# Patient Record
Sex: FEMALE | Race: WHITE | NOT HISPANIC OR LATINO | URBAN - METROPOLITAN AREA
[De-identification: names, ages, dates, MRNs, and addresses within clinical notes are randomized per-mention and may not be internally consistent; named-entity substitution may affect disease eponyms.]

---

## 2017-01-15 ENCOUNTER — EMERGENCY (EMERGENCY)
Facility: HOSPITAL | Age: 82
LOS: 1 days | Discharge: HOME | End: 2017-01-15
Attending: EMERGENCY MEDICINE | Admitting: EMERGENCY MEDICINE
Payer: MEDICARE

## 2017-01-15 VITALS
SYSTOLIC BLOOD PRESSURE: 188 MMHG | WEIGHT: 143.08 LBS | TEMPERATURE: 98 F | OXYGEN SATURATION: 95 % | RESPIRATION RATE: 18 BRPM | HEIGHT: 67 IN | DIASTOLIC BLOOD PRESSURE: 91 MMHG | HEART RATE: 74 BPM

## 2017-01-15 DIAGNOSIS — J45.901 UNSPECIFIED ASTHMA WITH (ACUTE) EXACERBATION: ICD-10-CM

## 2017-01-15 DIAGNOSIS — R05 COUGH: ICD-10-CM

## 2017-01-15 DIAGNOSIS — Z79.899 OTHER LONG TERM (CURRENT) DRUG THERAPY: ICD-10-CM

## 2017-01-15 DIAGNOSIS — Z87.891 PERSONAL HISTORY OF NICOTINE DEPENDENCE: ICD-10-CM

## 2017-01-15 LAB
ALBUMIN SERPL ELPH-MCNC: 3.7 G/DL — SIGNIFICANT CHANGE UP (ref 3.4–5)
ALP SERPL-CCNC: 73 U/L — SIGNIFICANT CHANGE UP (ref 40–120)
ALT FLD-CCNC: 15 U/L — SIGNIFICANT CHANGE UP (ref 12–42)
ANION GAP SERPL CALC-SCNC: 9 MMOL/L — SIGNIFICANT CHANGE UP (ref 9–16)
AST SERPL-CCNC: 20 U/L — SIGNIFICANT CHANGE UP (ref 15–37)
BASOPHILS NFR BLD AUTO: 0.2 % — SIGNIFICANT CHANGE UP (ref 0–2)
BILIRUB SERPL-MCNC: 1.1 MG/DL — SIGNIFICANT CHANGE UP (ref 0.2–1.2)
BUN SERPL-MCNC: 17 MG/DL — SIGNIFICANT CHANGE UP (ref 7–23)
CALCIUM SERPL-MCNC: 10.1 MG/DL — SIGNIFICANT CHANGE UP (ref 8.5–10.5)
CHLORIDE SERPL-SCNC: 99 MMOL/L — SIGNIFICANT CHANGE UP (ref 96–108)
CK MB CFR SERPL CALC: 1.3 NG/ML — SIGNIFICANT CHANGE UP (ref 0.5–3.6)
CK SERPL-CCNC: 63 U/L — SIGNIFICANT CHANGE UP (ref 26–192)
CO2 SERPL-SCNC: 29 MMOL/L — SIGNIFICANT CHANGE UP (ref 22–31)
CREAT SERPL-MCNC: 1.19 MG/DL — SIGNIFICANT CHANGE UP (ref 0.5–1.3)
EOSINOPHIL NFR BLD AUTO: 0.7 % — SIGNIFICANT CHANGE UP (ref 0–6)
EXTRA BLUE TOP TUBE: SIGNIFICANT CHANGE UP
EXTRA SST TUBE: SIGNIFICANT CHANGE UP
GAS PNL BLDV: SIGNIFICANT CHANGE UP
GLUCOSE SERPL-MCNC: 85 MG/DL — SIGNIFICANT CHANGE UP (ref 70–99)
HCT VFR BLD CALC: 44 % — SIGNIFICANT CHANGE UP (ref 34.5–45)
HGB BLD-MCNC: 14.2 G/DL — SIGNIFICANT CHANGE UP (ref 11.5–15.5)
LACTATE SERPL-SCNC: 1.4 MMOL/L — SIGNIFICANT CHANGE UP (ref 0.5–2)
LYMPHOCYTES # BLD AUTO: 6.9 % — LOW (ref 13–44)
MCHC RBC-ENTMCNC: 29.5 PG — SIGNIFICANT CHANGE UP (ref 27–34)
MCHC RBC-ENTMCNC: 32.3 G/DL — SIGNIFICANT CHANGE UP (ref 32–36)
MCV RBC AUTO: 91.5 FL — SIGNIFICANT CHANGE UP (ref 80–100)
MONOCYTES NFR BLD AUTO: 8.2 % — SIGNIFICANT CHANGE UP (ref 2–14)
NEUTROPHILS NFR BLD AUTO: 84 % — HIGH (ref 43–77)
PLATELET # BLD AUTO: 446 K/UL — HIGH (ref 150–400)
POTASSIUM SERPL-MCNC: 4.5 MMOL/L — SIGNIFICANT CHANGE UP (ref 3.5–5.3)
POTASSIUM SERPL-SCNC: 4.5 MMOL/L — SIGNIFICANT CHANGE UP (ref 3.5–5.3)
PROT SERPL-MCNC: 8.1 G/DL — SIGNIFICANT CHANGE UP (ref 6.4–8.2)
RBC # BLD: 4.81 M/UL — SIGNIFICANT CHANGE UP (ref 3.8–5.2)
RBC # FLD: 16.2 % — SIGNIFICANT CHANGE UP (ref 10.3–16.9)
SODIUM SERPL-SCNC: 137 MMOL/L — SIGNIFICANT CHANGE UP (ref 135–145)
TROPONIN I SERPL-MCNC: <0.015 NG/ML — SIGNIFICANT CHANGE UP (ref 0.01–0.04)
WBC # BLD: 10.3 K/UL — SIGNIFICANT CHANGE UP (ref 3.8–10.5)
WBC # FLD AUTO: 10.3 K/UL — SIGNIFICANT CHANGE UP (ref 3.8–10.5)

## 2017-01-15 PROCEDURE — 93010 ELECTROCARDIOGRAM REPORT: CPT

## 2017-01-15 PROCEDURE — 85025 COMPLETE CBC W/AUTO DIFF WBC: CPT

## 2017-01-15 PROCEDURE — 71046 X-RAY EXAM CHEST 2 VIEWS: CPT

## 2017-01-15 PROCEDURE — 87040 BLOOD CULTURE FOR BACTERIA: CPT

## 2017-01-15 PROCEDURE — 82330 ASSAY OF CALCIUM: CPT

## 2017-01-15 PROCEDURE — 82553 CREATINE MB FRACTION: CPT

## 2017-01-15 PROCEDURE — 99284 EMERGENCY DEPT VISIT MOD MDM: CPT | Mod: 25

## 2017-01-15 PROCEDURE — 80053 COMPREHEN METABOLIC PANEL: CPT

## 2017-01-15 PROCEDURE — 84132 ASSAY OF SERUM POTASSIUM: CPT

## 2017-01-15 PROCEDURE — 83605 ASSAY OF LACTIC ACID: CPT

## 2017-01-15 PROCEDURE — 84295 ASSAY OF SERUM SODIUM: CPT

## 2017-01-15 PROCEDURE — 82550 ASSAY OF CK (CPK): CPT

## 2017-01-15 PROCEDURE — 71020: CPT | Mod: 26

## 2017-01-15 PROCEDURE — 84484 ASSAY OF TROPONIN QUANT: CPT

## 2017-01-15 PROCEDURE — 93005 ELECTROCARDIOGRAM TRACING: CPT

## 2017-01-15 PROCEDURE — 82803 BLOOD GASES ANY COMBINATION: CPT

## 2017-01-15 PROCEDURE — 94640 AIRWAY INHALATION TREATMENT: CPT

## 2017-01-15 RX ORDER — ALBUTEROL 90 UG/1
2 AEROSOL, METERED ORAL
Qty: 1 | Refills: 0 | OUTPATIENT
Start: 2017-01-15

## 2017-01-15 RX ORDER — IPRATROPIUM/ALBUTEROL SULFATE 18-103MCG
3 AEROSOL WITH ADAPTER (GRAM) INHALATION ONCE
Qty: 0 | Refills: 0 | Status: COMPLETED | OUTPATIENT
Start: 2017-01-15 | End: 2017-01-15

## 2017-01-15 RX ORDER — SODIUM CHLORIDE 9 MG/ML
1000 INJECTION INTRAMUSCULAR; INTRAVENOUS; SUBCUTANEOUS ONCE
Qty: 0 | Refills: 0 | Status: COMPLETED | OUTPATIENT
Start: 2017-01-15 | End: 2017-01-15

## 2017-01-15 RX ORDER — ALBUTEROL 90 UG/1
3 AEROSOL, METERED ORAL
Qty: 30 | Refills: 0 | OUTPATIENT
Start: 2017-01-15

## 2017-01-15 RX ADMIN — SODIUM CHLORIDE 1000 MILLILITER(S): 9 INJECTION INTRAMUSCULAR; INTRAVENOUS; SUBCUTANEOUS at 16:42

## 2017-01-15 RX ADMIN — Medication 3 MILLILITER(S): at 16:43

## 2017-01-15 RX ADMIN — Medication 3 MILLILITER(S): at 16:25

## 2017-01-15 RX ADMIN — Medication 50 MILLIGRAM(S): at 16:42

## 2017-01-15 RX ADMIN — Medication 1 TABLET(S): at 18:25

## 2017-01-15 NOTE — ED PROVIDER NOTE - CONDUCTED A DETAILED DISCUSSION WITH PATIENT AND/OR GUARDIAN REGARDING, MDM
need for outpatient follow-up/radiology results/lab results/return to ED if symptoms worsen, persist or questions arise

## 2017-01-15 NOTE — ED PROVIDER NOTE - PROGRESS NOTE DETAILS
pt feels better after nebs. labs unremarkable, given persistent productive cough and ?small infiltrate on CXR will treat with Augmentin x 10 day and also rx nebs/steroids for concomitant asthma/copd exacerbation. Pt advised to f/u with pulm/pmd promptly.

## 2017-01-15 NOTE — ED ADULT NURSE REASSESSMENT NOTE - NS ED NURSE REASSESS COMMENT FT1
IV removed, pt given d/c teaching and instructions regarding sx to monitor which would require return to ER.  Pt requested speaking to Dr. Alas.  Pt left ER immediately after speaking to Dr. Alas, and did not wait for discharge documents or vital sign remeasurement. Breathing unlabored, skin warm and dry.

## 2017-01-15 NOTE — ED PROVIDER NOTE - OBJECTIVE STATEMENT
81F with h/o afib on Pradaxa, COPD/asthma, hypothyroidism, who p/w cold prob of yellow sputum and cough 10 days ago followed by worsening cough and sob. Pt took some doxycycline she had at home x 10days with no improvement. No CP, no LE edema, no recent travel.  at home was sick with a cold as well. PMD Dr. Mathias, pulm Dr. Tate.

## 2017-01-15 NOTE — ED ADULT NURSE NOTE - OBJECTIVE STATEMENT
pt to ER w/ report of sob and cough.  pt reports recent course of oral abx for dx of bronchitis.  pt denies hughes, denies cp/f/c/n/v.  O2 sat noted to be 93% on room air.  pt placed on 2L O2 via nc.  no excessive work of breathing noted.  breath sounds clear on ausculation.  skin warm and dry.  Will continue to monitor.

## 2017-01-15 NOTE — ED PROVIDER NOTE - MEDICAL DECISION MAKING DETAILS
81F with persistent cough with SOb x 10 days not improved with doxy at home. Pt in no acute resp distress but with wheezes and rhonchi on exam. afebrile, VSS. Will check labs and cxr. nebs steroids possibly abx

## 2017-01-20 LAB
CULTURE RESULTS: SIGNIFICANT CHANGE UP
CULTURE RESULTS: SIGNIFICANT CHANGE UP
SPECIMEN SOURCE: SIGNIFICANT CHANGE UP
SPECIMEN SOURCE: SIGNIFICANT CHANGE UP

## 2017-01-26 ENCOUNTER — INPATIENT (INPATIENT)
Facility: HOSPITAL | Age: 82
LOS: 4 days | Discharge: HOME | DRG: 192 | End: 2017-01-31
Attending: INTERNAL MEDICINE | Admitting: INTERNAL MEDICINE
Payer: MEDICARE

## 2017-01-26 VITALS
DIASTOLIC BLOOD PRESSURE: 85 MMHG | HEART RATE: 82 BPM | TEMPERATURE: 98 F | RESPIRATION RATE: 22 BRPM | OXYGEN SATURATION: 88 % | WEIGHT: 143.96 LBS | SYSTOLIC BLOOD PRESSURE: 161 MMHG

## 2017-01-26 LAB
ALBUMIN SERPL ELPH-MCNC: 3.1 G/DL — LOW (ref 3.4–5)
ALP SERPL-CCNC: 57 U/L — SIGNIFICANT CHANGE UP (ref 40–120)
ALT FLD-CCNC: 31 U/L — SIGNIFICANT CHANGE UP (ref 12–42)
ANION GAP SERPL CALC-SCNC: 7 MMOL/L — LOW (ref 9–16)
AST SERPL-CCNC: 43 U/L — HIGH (ref 15–37)
BASE EXCESS BLDV CALC-SCNC: 4.4 MMOL/L — SIGNIFICANT CHANGE UP
BASOPHILS NFR BLD AUTO: 0.1 % — SIGNIFICANT CHANGE UP (ref 0–2)
BILIRUB SERPL-MCNC: 0.6 MG/DL — SIGNIFICANT CHANGE UP (ref 0.2–1.2)
BUN SERPL-MCNC: 17 MG/DL — SIGNIFICANT CHANGE UP (ref 7–23)
CA-I SERPL-SCNC: 1.15 MMOL/L — SIGNIFICANT CHANGE UP (ref 1.1–1.3)
CALCIUM SERPL-MCNC: 8.3 MG/DL — LOW (ref 8.5–10.5)
CHLORIDE SERPL-SCNC: 99 MMOL/L — SIGNIFICANT CHANGE UP (ref 96–108)
CO2 SERPL-SCNC: 31 MMOL/L — SIGNIFICANT CHANGE UP (ref 22–31)
CREAT SERPL-MCNC: 0.96 MG/DL — SIGNIFICANT CHANGE UP (ref 0.5–1.3)
EXTRA BLUE TOP TUBE: SIGNIFICANT CHANGE UP
GAS PNL BLDV: 135 MMOL/L — LOW (ref 138–146)
GAS PNL BLDV: SIGNIFICANT CHANGE UP
GAS PNL BLDV: SIGNIFICANT CHANGE UP
GLUCOSE SERPL-MCNC: 114 MG/DL — HIGH (ref 70–99)
HCO3 BLDV-SCNC: 30 MMOL/L — HIGH (ref 20–27)
HCT VFR BLD CALC: 39.9 % — SIGNIFICANT CHANGE UP (ref 34.5–45)
HGB BLD-MCNC: 13.3 G/DL — SIGNIFICANT CHANGE UP (ref 11.5–15.5)
LACTATE SERPL-SCNC: 2.8 MMOL/L — HIGH (ref 0.5–2)
LYMPHOCYTES # BLD AUTO: 3.5 % — LOW (ref 13–44)
MCHC RBC-ENTMCNC: 30.5 PG — SIGNIFICANT CHANGE UP (ref 27–34)
MCHC RBC-ENTMCNC: 33.3 G/DL — SIGNIFICANT CHANGE UP (ref 32–36)
MCV RBC AUTO: 91.5 FL — SIGNIFICANT CHANGE UP (ref 80–100)
MONOCYTES NFR BLD AUTO: 4.1 % — SIGNIFICANT CHANGE UP (ref 2–14)
NEUTROPHILS NFR BLD AUTO: 92.3 % — HIGH (ref 43–77)
PCO2 BLDV: 51 MMHG — SIGNIFICANT CHANGE UP (ref 41–51)
PH BLDV: 7.4 — SIGNIFICANT CHANGE UP (ref 7.32–7.43)
PLATELET # BLD AUTO: 324 K/UL — SIGNIFICANT CHANGE UP (ref 150–400)
PO2 BLDV: 33 MMHG — SIGNIFICANT CHANGE UP
POTASSIUM BLDV-SCNC: 4.1 MMOL/L — SIGNIFICANT CHANGE UP (ref 3.5–4.9)
POTASSIUM SERPL-MCNC: 4.1 MMOL/L — SIGNIFICANT CHANGE UP (ref 3.5–5.3)
POTASSIUM SERPL-SCNC: 4.1 MMOL/L — SIGNIFICANT CHANGE UP (ref 3.5–5.3)
PROT SERPL-MCNC: 6.7 G/DL — SIGNIFICANT CHANGE UP (ref 6.4–8.2)
RBC # BLD: 4.36 M/UL — SIGNIFICANT CHANGE UP (ref 3.8–5.2)
RBC # FLD: 16 % — SIGNIFICANT CHANGE UP (ref 10.3–16.9)
SODIUM SERPL-SCNC: 137 MMOL/L — SIGNIFICANT CHANGE UP (ref 135–145)
WBC # BLD: 10 K/UL — SIGNIFICANT CHANGE UP (ref 3.8–10.5)
WBC # FLD AUTO: 10 K/UL — SIGNIFICANT CHANGE UP (ref 3.8–10.5)

## 2017-01-26 PROCEDURE — 71020: CPT | Mod: 26

## 2017-01-26 PROCEDURE — 93010 ELECTROCARDIOGRAM REPORT: CPT

## 2017-01-26 PROCEDURE — 99285 EMERGENCY DEPT VISIT HI MDM: CPT | Mod: 25

## 2017-01-26 PROCEDURE — 71275 CT ANGIOGRAPHY CHEST: CPT | Mod: 26

## 2017-01-26 RX ORDER — DABIGATRAN ETEXILATE MESYLATE 150 MG/1
150 CAPSULE ORAL ONCE
Qty: 0 | Refills: 0 | Status: COMPLETED | OUTPATIENT
Start: 2017-01-26 | End: 2017-01-26

## 2017-01-26 RX ORDER — SODIUM CHLORIDE 9 MG/ML
1000 INJECTION INTRAMUSCULAR; INTRAVENOUS; SUBCUTANEOUS ONCE
Qty: 0 | Refills: 0 | Status: COMPLETED | OUTPATIENT
Start: 2017-01-26 | End: 2017-01-26

## 2017-01-26 RX ADMIN — SODIUM CHLORIDE 1000 MILLILITER(S): 9 INJECTION INTRAMUSCULAR; INTRAVENOUS; SUBCUTANEOUS at 18:38

## 2017-01-26 RX ADMIN — Medication 40 MILLIGRAM(S): at 18:33

## 2017-01-26 RX ADMIN — DABIGATRAN ETEXILATE MESYLATE 150 MILLIGRAM(S): 150 CAPSULE ORAL at 21:39

## 2017-01-26 NOTE — ED ADULT NURSE NOTE - OBJECTIVE STATEMENT
received pt in room 20. A&Ox3, presents to ed from pmd office for adm. dx bronchitis approx 1 week ago, c.o productive cough with sob. pt sating 88% ra. placed on supplemental o2 via nc, sating well at this time. denies any distress. pt denies recent fevers or chills. no cp. no n/v/d. ekg in progress. iv placed, labs drawn. will monitor.

## 2017-01-26 NOTE — ED PROVIDER NOTE - PROGRESS NOTE DETAILS
discussed with Dr. Keller covering for [facility].  Start solumedrol 40mg q8hr, levaquin IV for pneumonia/copd, and do cta chest r/o pe/ infiltrate.  Admit

## 2017-01-26 NOTE — ED ADULT TRIAGE NOTE - CHIEF COMPLAINT QUOTE
patient complains of productive cough and exertional SOB. patient coming from Dr. Tate's office for low oxygen levels.

## 2017-01-26 NOTE — PROGRESS NOTE ADULT - SUBJECTIVE AND OBJECTIVE BOX
Interventional, Pulmonary, Critical, Chest Special Procedures.    Pt was seen and fully examined by myself.     Time spent with patient in minutes:    Patient is a 81y old  Female who presents with a chief complaint of unresolving COPD exacerbation despite adequate O/p mngmnt  HPI:    REVIEW OF SYSTEMS:  Constitutional: No fever, weight loss,profound  fatigue  Eyes: No eye pain, visual disturbances, or discharge  ENMT:  No difficulty hearing, tinnitus, vertigo; No sinus or throat pain. No epistaxis, dysphagia, dysphonia, hoarseness or odynophagia  Neck: No pain, stiffness or neck swelling.  No masses or deformities  Respiratory: No cough, wheezing, chills or hemoptysis  - COPD  - ILD   - PE   - ASTHMA     - PNEUMONIA  Cardiovascular: No chest pain, dysrhythmia, palpitations, dizziness or edema   - COPD     - CAD   - CHF   - HTN  Gastrointestinal: No abdominal or epigastric pain. No nausea, vomiting or hematemesis; No diarrhea or constipation. No melena or hematochezia. No dysphagia or Icterus.          Genitourinary: No dysuria, frequency, hematuria or incontinence   - CKD/EVIE      - ESRD  Neurological: No headaches, memory loss, loss of strength, numbness or tremors      -DEMENTIA     - STROKE    - SEIZURE  Skin: No itching, burning, rashes or lesions   Lymph Nodes: No enlarged glands  Endocrine: No heat or cold intolerance; No hair loss       - DM     - THYROID DISORDER  Musculoskeletal: No joint pain or swelling; No muscle, back or extremity pain  Psychiatric: No depression, anxiety, mood swings or difficulty sleeping  Heme/Lymph: No easy bruising or bleeding gums         - ANEMIA      - CANCER   -COAGULOPATHY  Allergy and Immunologic: No hives or eczema    PAST MEDICAL & SURGICAL HISTORY:  Atrial fibrillation  Asthma  Bronchitis  No significant past surgical history    FAMILY HISTORY:    SOCIAL HISTORY:      - Tobacco     - ETOH    Allergies    Mustard (Anaphylaxis)  No Known Drug Allergies    Intolerances      Vital Signs Last 24 Hrs  T(C): 36.8, Max: 36.8 (01-26 @ 20:58)  T(F): 98.2, Max: 98.2 (01-26 @ 20:58)  HR: 72 (71 - 82)  BP: 178/85 (155/83 - 178/85)  BP(mean): --  RR: 20 (20 - 22)  SpO2: 97% (88% - 97%)      PHYSICAL EXAM:  Comfortable, no distress  Eyes: PERRL, EOM intact; conjunctiva and sclera clear  Head: Normocephalic;  No Trauma  ENMT: No nasal discharge, hoarseness, cough or hemoptysis.  Airway clear  Neck: Supple; non tender; no masses or deformities.    No JVD  Respiratory: CTA,  - WHEEZING   + RHONCHI  - RALES  + CRACKLES.  Diminished breath sounds  BILATERAL  RIGHT  LEFT  Cardiovascular: Regular rate and rhythm. S1 and S2 Normal; No murmurs, gallops or rubs     - PPM/AICD  Gastrointestinal: Soft non-tender, non-distended; Normal bowel sounds; No hepatosplenomegaly.     -PEG    -  GT   - GARCIA  Genitourinary: No costovertebral angle tenderness. No dysuria  Extremities: AROM, No clubbing, cyanosis or edema    Vascular: Peripheral pulses palpable 2+ bilaterally  Neurological: Alert and responisve to stimuli   Skin: Warm and dry. No obvious rash  Lymph Nodes: No acute cervical or supraclavicular adenopathy  Psychiatric: Cooperative and appropriate mood    DEVICES:  - DENTURES   +IV R / L     - ETUBE   -TRACH   -CTUBE  R / L      LABS:                          13.3   10.0  )-----------( 324      ( 26 Jan 2017 17:58 )             39.9     26 Jan 2017 17:58    137    |  99     |  17     ----------------------------<  114    4.1     |  31     |  0.96     Ca    8.3        26 Jan 2017 17:58    TPro  6.7    /  Alb  3.1    /  TBili  0.6    /  DBili  x      /  AST  43     /  ALT  31     /  AlkPhos  57     26 Jan 2017 17:58      EXAM:  XR CHEST PA - LAT                           PROCEDURE DATE:  01/15/2017                 INTERPRETATION:  XR CHEST PA - LAT dated 1/15/2017 4:53 PM    CLINICAL INFORMATION: Female, 81 years old.  cough, r/o pna, h/o copd.    PRIOR STUDIES: None.    FINDINGS: Heart size is, mediastinal and hilar contours are within normal   limits. Prominent sized left pericardial fat pad is incidentally noted.   There is hyperaeration as well as prominence of bronchovascular markings   which may suggest bronchitis. A lateral view there is blunting of the   posterior costophrenic angles which may represent small bibasilar pleural   effusions. There may be some mild pulmonary venous congestion. Soft   tissues and osseous structures demonstrate age-appropriate degenerative   change.    IMPRESSION:  Cardiomegaly with mild pulmonary venous congestion.  Hyperaeration.  Small bibasilar pleural effusionsRADIOLOGY & ADDITIONAL STUDIES (The following images were personally reviewed):  CT prelimunar reviewed c radiology, no obvious PE

## 2017-01-26 NOTE — ED ADULT NURSE REASSESSMENT NOTE - NS ED NURSE REASSESS COMMENT FT1
Pt endorsed to me from previous shift, A&O x4, denies any pain at this time, no SOB, breath sounds clear on auscultation, awaiting CT scan results. Will continue to monitor.

## 2017-01-26 NOTE — ED PROVIDER NOTE - OBJECTIVE STATEMENT
history of afib copd, here with shortness of breath with exertion/ dry cough for the past few weeks.  Seen in the ED, given prednisone and augmentin which she has taken without relief.  Denies fever/chills, pain.  Comfortable at rest but symptoms worsen with exertion/ activity.

## 2017-01-26 NOTE — ED PROVIDER NOTE - MEDICAL DECISION MAKING DETAILS
progressive shortness of breath/hughes.  diagnosed and treated for CAP but not improving with outpatient treatment.  concern for chf, pe, emphsema/copd given lack of fever and leukocytosis.  bnp mildly elevated, cta done to r/o pe.  signed out to Dr. Carrero/STEVE Sim pending cta with plan to admit for further treatment given hypoxia at rest/with exertion on room air.  antibiotics for cap, steroids for emphysema/copd given

## 2017-01-26 NOTE — ED CLERICAL - NS ED CLERK NOTE PRE-ARRIVAL INFORMATION; ADDITIONAL PRE-ARRIVAL INFORMATION
81/F/ ADELINA SHAW COPD, RX D PNEUMONIA HYPOXIC  CAN'T WALK MORE THAN A FEW STEPS DR. SAMIRA MELTON SENT THE PT DR. JULIUS GRACIA (DR. CONTRERAS)

## 2017-01-27 ENCOUNTER — TRANSCRIPTION ENCOUNTER (OUTPATIENT)
Age: 82
End: 2017-01-27

## 2017-01-27 DIAGNOSIS — I48.91 UNSPECIFIED ATRIAL FIBRILLATION: ICD-10-CM

## 2017-01-27 DIAGNOSIS — E03.9 HYPOTHYROIDISM, UNSPECIFIED: ICD-10-CM

## 2017-01-27 DIAGNOSIS — J44.1 CHRONIC OBSTRUCTIVE PULMONARY DISEASE WITH (ACUTE) EXACERBATION: ICD-10-CM

## 2017-01-27 DIAGNOSIS — R63.8 OTHER SYMPTOMS AND SIGNS CONCERNING FOOD AND FLUID INTAKE: ICD-10-CM

## 2017-01-27 DIAGNOSIS — J45.909 UNSPECIFIED ASTHMA, UNCOMPLICATED: ICD-10-CM

## 2017-01-27 DIAGNOSIS — I27.2 OTHER SECONDARY PULMONARY HYPERTENSION: ICD-10-CM

## 2017-01-27 DIAGNOSIS — Z29.9 ENCOUNTER FOR PROPHYLACTIC MEASURES, UNSPECIFIED: ICD-10-CM

## 2017-01-27 LAB
ANION GAP SERPL CALC-SCNC: 8 MMOL/L — LOW (ref 9–16)
BASOPHILS NFR BLD AUTO: 0 % — SIGNIFICANT CHANGE UP (ref 0–2)
BUN SERPL-MCNC: 15 MG/DL — SIGNIFICANT CHANGE UP (ref 7–23)
CALCIUM SERPL-MCNC: 8.9 MG/DL — SIGNIFICANT CHANGE UP (ref 8.5–10.5)
CHLORIDE SERPL-SCNC: 103 MMOL/L — SIGNIFICANT CHANGE UP (ref 96–108)
CO2 SERPL-SCNC: 28 MMOL/L — SIGNIFICANT CHANGE UP (ref 22–31)
CREAT SERPL-MCNC: 0.78 MG/DL — SIGNIFICANT CHANGE UP (ref 0.5–1.3)
EOSINOPHIL NFR BLD AUTO: 0 % — SIGNIFICANT CHANGE UP (ref 0–6)
GLUCOSE SERPL-MCNC: 138 MG/DL — HIGH (ref 70–99)
HCT VFR BLD CALC: 38.4 % — SIGNIFICANT CHANGE UP (ref 34.5–45)
HGB BLD-MCNC: 12.6 G/DL — SIGNIFICANT CHANGE UP (ref 11.5–15.5)
LYMPHOCYTES # BLD AUTO: 6.3 % — LOW (ref 13–44)
MAGNESIUM SERPL-MCNC: 2.3 MG/DL — SIGNIFICANT CHANGE UP (ref 1.6–2.4)
MCHC RBC-ENTMCNC: 30 PG — SIGNIFICANT CHANGE UP (ref 27–34)
MCHC RBC-ENTMCNC: 32.8 G/DL — SIGNIFICANT CHANGE UP (ref 32–36)
MCV RBC AUTO: 91.4 FL — SIGNIFICANT CHANGE UP (ref 80–100)
MONOCYTES NFR BLD AUTO: 2.8 % — SIGNIFICANT CHANGE UP (ref 2–14)
NEUTROPHILS NFR BLD AUTO: 90.9 % — HIGH (ref 43–77)
PHOSPHATE SERPL-MCNC: 3.3 MG/DL — SIGNIFICANT CHANGE UP (ref 2.5–4.5)
PLATELET # BLD AUTO: 294 K/UL — SIGNIFICANT CHANGE UP (ref 150–400)
POTASSIUM SERPL-MCNC: 4.2 MMOL/L — SIGNIFICANT CHANGE UP (ref 3.5–5.3)
POTASSIUM SERPL-SCNC: 4.2 MMOL/L — SIGNIFICANT CHANGE UP (ref 3.5–5.3)
RAPID RVP RESULT: DETECTED
RBC # BLD: 4.2 M/UL — SIGNIFICANT CHANGE UP (ref 3.8–5.2)
RBC # FLD: 15.9 % — SIGNIFICANT CHANGE UP (ref 10.3–16.9)
RV+EV RNA SPEC QL NAA+PROBE: DETECTED
SAO2 % BLDV: 63 % — SIGNIFICANT CHANGE UP
SODIUM SERPL-SCNC: 139 MMOL/L — SIGNIFICANT CHANGE UP (ref 135–145)
TSH SERPL-MCNC: 0.42 UIU/ML — SIGNIFICANT CHANGE UP (ref 0.35–4.94)
WBC # BLD: 8 K/UL — SIGNIFICANT CHANGE UP (ref 3.8–10.5)
WBC # FLD AUTO: 8 K/UL — SIGNIFICANT CHANGE UP (ref 3.8–10.5)

## 2017-01-27 PROCEDURE — 93306 TTE W/DOPPLER COMPLETE: CPT | Mod: 26

## 2017-01-27 PROCEDURE — 93010 ELECTROCARDIOGRAM REPORT: CPT

## 2017-01-27 RX ORDER — ALBUTEROL 90 UG/1
0 AEROSOL, METERED ORAL
Qty: 0 | Refills: 0 | COMMUNITY

## 2017-01-27 RX ORDER — TIOTROPIUM BROMIDE 18 UG/1
0 CAPSULE ORAL; RESPIRATORY (INHALATION)
Qty: 0 | Refills: 0 | COMMUNITY

## 2017-01-27 RX ORDER — FLUTICASONE PROPIONATE AND SALMETEROL 50; 250 UG/1; UG/1
0 POWDER ORAL; RESPIRATORY (INHALATION)
Qty: 0 | Refills: 0 | COMMUNITY

## 2017-01-27 RX ORDER — MINERAL OIL
30 OIL (ML) MISCELLANEOUS DAILY
Qty: 0 | Refills: 0 | Status: DISCONTINUED | OUTPATIENT
Start: 2017-01-27 | End: 2017-01-28

## 2017-01-27 RX ORDER — DABIGATRAN ETEXILATE MESYLATE 150 MG/1
150 CAPSULE ORAL EVERY 12 HOURS
Qty: 0 | Refills: 0 | Status: DISCONTINUED | OUTPATIENT
Start: 2017-01-27 | End: 2017-01-31

## 2017-01-27 RX ORDER — MINERAL OIL
30 OIL (ML) MISCELLANEOUS ONCE
Qty: 0 | Refills: 0 | Status: COMPLETED | OUTPATIENT
Start: 2017-01-27 | End: 2017-01-27

## 2017-01-27 RX ORDER — DABIGATRAN ETEXILATE MESYLATE 150 MG/1
150 CAPSULE ORAL DAILY
Qty: 0 | Refills: 0 | Status: DISCONTINUED | OUTPATIENT
Start: 2017-01-27 | End: 2017-01-27

## 2017-01-27 RX ORDER — IPRATROPIUM/ALBUTEROL SULFATE 18-103MCG
3 AEROSOL WITH ADAPTER (GRAM) INHALATION EVERY 6 HOURS
Qty: 0 | Refills: 0 | Status: DISCONTINUED | OUTPATIENT
Start: 2017-01-27 | End: 2017-01-27

## 2017-01-27 RX ORDER — DIGOXIN 250 MCG
0.25 TABLET ORAL DAILY
Qty: 0 | Refills: 0 | Status: DISCONTINUED | OUTPATIENT
Start: 2017-01-27 | End: 2017-01-31

## 2017-01-27 RX ORDER — LEVOTHYROXINE SODIUM 125 MCG
0 TABLET ORAL
Qty: 0 | Refills: 0 | COMMUNITY

## 2017-01-27 RX ORDER — PANTOPRAZOLE SODIUM 20 MG/1
1 TABLET, DELAYED RELEASE ORAL
Qty: 14 | Refills: 0
Start: 2017-01-27 | End: 2017-02-10

## 2017-01-27 RX ORDER — MONTELUKAST 4 MG/1
10 TABLET, CHEWABLE ORAL DAILY
Qty: 0 | Refills: 0 | Status: DISCONTINUED | OUTPATIENT
Start: 2017-01-27 | End: 2017-01-31

## 2017-01-27 RX ORDER — MONTELUKAST 4 MG/1
0 TABLET, CHEWABLE ORAL
Qty: 0 | Refills: 0 | COMMUNITY

## 2017-01-27 RX ORDER — PANTOPRAZOLE SODIUM 20 MG/1
40 TABLET, DELAYED RELEASE ORAL
Qty: 0 | Refills: 0 | Status: DISCONTINUED | OUTPATIENT
Start: 2017-01-27 | End: 2017-01-31

## 2017-01-27 RX ORDER — AMIODARONE HYDROCHLORIDE 400 MG/1
200 TABLET ORAL DAILY
Qty: 0 | Refills: 0 | Status: DISCONTINUED | OUTPATIENT
Start: 2017-01-27 | End: 2017-01-31

## 2017-01-27 RX ORDER — FLUTICASONE PROPIONATE AND SALMETEROL 50; 250 UG/1; UG/1
1 POWDER ORAL; RESPIRATORY (INHALATION)
Qty: 0 | Refills: 0 | Status: DISCONTINUED | OUTPATIENT
Start: 2017-01-27 | End: 2017-01-27

## 2017-01-27 RX ORDER — IPRATROPIUM/ALBUTEROL SULFATE 18-103MCG
3 AEROSOL WITH ADAPTER (GRAM) INHALATION EVERY 4 HOURS
Qty: 0 | Refills: 0 | Status: DISCONTINUED | OUTPATIENT
Start: 2017-01-27 | End: 2017-01-31

## 2017-01-27 RX ORDER — DABIGATRAN ETEXILATE MESYLATE 150 MG/1
0 CAPSULE ORAL
Qty: 0 | Refills: 0 | COMMUNITY

## 2017-01-27 RX ORDER — AMIODARONE HYDROCHLORIDE 400 MG/1
0 TABLET ORAL
Qty: 0 | Refills: 0 | COMMUNITY

## 2017-01-27 RX ORDER — LEVOTHYROXINE SODIUM 125 MCG
75 TABLET ORAL DAILY
Qty: 0 | Refills: 0 | Status: DISCONTINUED | OUTPATIENT
Start: 2017-01-27 | End: 2017-01-31

## 2017-01-27 RX ORDER — DABIGATRAN ETEXILATE MESYLATE 150 MG/1
150 CAPSULE ORAL
Qty: 0 | Refills: 0 | COMMUNITY

## 2017-01-27 RX ORDER — INSULIN LISPRO 100/ML
VIAL (ML) SUBCUTANEOUS
Qty: 0 | Refills: 0 | Status: DISCONTINUED | OUTPATIENT
Start: 2017-01-27 | End: 2017-01-31

## 2017-01-27 RX ADMIN — PANTOPRAZOLE SODIUM 40 MILLIGRAM(S): 20 TABLET, DELAYED RELEASE ORAL at 07:14

## 2017-01-27 RX ADMIN — Medication 75 MICROGRAM(S): at 07:14

## 2017-01-27 RX ADMIN — Medication 2: at 22:59

## 2017-01-27 RX ADMIN — Medication 30 MILLILITER(S): at 01:18

## 2017-01-27 RX ADMIN — DABIGATRAN ETEXILATE MESYLATE 150 MILLIGRAM(S): 150 CAPSULE ORAL at 10:10

## 2017-01-27 RX ADMIN — Medication 40 MILLIGRAM(S): at 01:17

## 2017-01-27 RX ADMIN — Medication 3 MILLILITER(S): at 10:09

## 2017-01-27 RX ADMIN — MONTELUKAST 10 MILLIGRAM(S): 4 TABLET, CHEWABLE ORAL at 10:14

## 2017-01-27 RX ADMIN — Medication 3 MILLILITER(S): at 18:33

## 2017-01-27 RX ADMIN — AMIODARONE HYDROCHLORIDE 200 MILLIGRAM(S): 400 TABLET ORAL at 07:13

## 2017-01-27 RX ADMIN — Medication 40 MILLIGRAM(S): at 18:33

## 2017-01-27 RX ADMIN — Medication 3 MILLILITER(S): at 13:39

## 2017-01-27 RX ADMIN — Medication 40 MILLIGRAM(S): at 10:10

## 2017-01-27 RX ADMIN — Medication 3 MILLILITER(S): at 04:00

## 2017-01-27 RX ADMIN — Medication 0.25 MILLIGRAM(S): at 07:14

## 2017-01-27 RX ADMIN — DABIGATRAN ETEXILATE MESYLATE 150 MILLIGRAM(S): 150 CAPSULE ORAL at 20:50

## 2017-01-27 RX ADMIN — Medication 3 MILLILITER(S): at 22:59

## 2017-01-27 NOTE — CONSULT NOTE ADULT - SUBJECTIVE AND OBJECTIVE BOX
Patient is a 81y old  Female who presents with a chief complaint of SOB (27 Jan 2017 00:29)        Reason for consult:      HPI:  Patient is a 81 year old female with a PMHX of asthma, COPD, Afib (on pradaxa) and hypothyroidism who presents with a three week history of cough. She is admitted for an acute exacerbation of COPD. She had a CTA showing a PA diameter of 4cm. She reports no snoring noticed by her  and no witnessed apneas. She is not interested in a work up for ADAR[facility]. She does not get vaccines, including pneumovax and the flu shot.   No hx of clots  No hx of rheumatological conditions,         Allergies: NKDA      Meds:      PAST MEDICAL & SURGICAL HISTORY:  Atrial fibrillation  Asthma  Bronchitis  No significant past surgical history        Family history:  FAMILY HISTORY:      Social history:      REVIEW OF SYSTEMS:  Constitutional: No fever, weight loss or fatigue  Eyes: No eye pain, visual disturbances, or discharge  ENMT:  No difficulty hearing, tinnitus, vertigo; No sinus or throat pain  Neck: No pain, stiffness or neck swelling  Respiratory: No cough, wheezing, chills or hemoptysis  Cardiovascular: No chest pain, palpitations, dizziness or leg swelling  Gastrointestinal: No abdominal or epigastric pain. No nausea, vomiting or hematemesis; No diarrhea or constipation. No melena or hematochezia.  Genitourinary: No dysuria, frequency, hematuria or incontinence  Neurological: No headaches, memory loss, loss of strength, numbness or tremors  Skin: No itching, burning, rashes or lesions   Lymph Nodes: No enlarged glands  Endocrine: No heat or cold intolerance; No hair loss  Musculoskeletal: No joint pain or swelling; No muscle, back or extremity pain  Psychiatric: No depression, anxiety, mood swings or difficulty sleeping  Heme/Lymph: No easy bruising or bleeding gums  Allergy and Immunologic: No hives or eczema          Vital Signs Last 24 Hrs  T(C): 36.8, Max: 36.8 (01-26 @ 20:58)  T(F): 98.2, Max: 98.3 (01-27 @ 13:50)  HR: 78 (70 - 80)  BP: 149/77 (149/77 - 178/85)  BP(mean): --  RR: 14 (14 - 20)  SpO2: 93% (93% - 97%)        PHYSICAL EXAM:    General: Well developed; well nourished; in no acute distress  Eyes: PERRL, EOM intact; conjunctiva and sclera clear  Head: Normocephalic; atraumatic  ENMT: No nasal discharge; airway clear  Neck: No JVD  Respiratory: diminished breath sounds b/l    Cardiovascular: Regular rate and rhythm. S1 and S2 Normal; No murmurs, gallops or rubs  Gastrointestinal: Soft non-tender non-distended; Normal bowel sounds; No hepatosplenomegaly  Genitourinary: No costovertebral angle tenderness  Extremities: Normal range of motion, No clubbing, cyanosis or edema  Vascular: Peripheral pulses palpable 2+ bilaterally  Neurological: Alert and oriented x3  Skin: Warm and dry. No obvious rash  Lymph Nodes: No acute cervical or supraclavicular adenopathy  Musculoskeletal: Normal gait, tone, without deformities  Psychiatric: Cooperative and appropriate mood    LABS:      CBC Full  -  ( 27 Jan 2017 08:04 )  WBC Count : 8.0 K/uL  Hemoglobin : 12.6 g/dL  Hematocrit : 38.4 %  Platelet Count - Automated : 294 K/uL  Mean Cell Volume : 91.4 fL  Mean Cell Hemoglobin : 30.0 pg  Mean Cell Hemoglobin Concentration : 32.8 g/dL  Auto Neutrophil # : x  Auto Lymphocyte # : x  Auto Monocyte # : x  Auto Eosinophil # : x  Auto Basophil # : x  Auto Neutrophil % : 90.9 %  Auto Lymphocyte % : 6.3 %  Auto Monocyte % : 2.8 %  Auto Eosinophil % : 0.0 %  Auto Basophil % : 0.0 %    27 Jan 2017 08:04    139    |  103    |  15     ----------------------------<  138    4.2     |  28     |  0.78     Ca    8.9        27 Jan 2017 08:04  Phos  3.3       27 Jan 2017 08:04  Mg     2.3       27 Jan 2017 08:04    TPro  6.7    /  Alb  3.1    /  TBili  0.6    /  DBili  x      /  AST  43     /  ALT  31     /  AlkPhos  57     26 Jan 2017 17:58                      RADIOLOGY & ADDITIONAL STUDIES (The following images were personally reviewed):

## 2017-01-27 NOTE — PROGRESS NOTE ADULT - SUBJECTIVE AND OBJECTIVE BOX
Interventional, Pulmonary, Critical, Chest Special Procedures.    Pt was seen and fully examined by myself.     Time spent with patient in minutes:37    Patient is a 81y old  Female who presents with a chief complaint of SOB (27 Jan 2017 00:29) the patient feels and appears better, less cough , less SOB    HPI:  Patient is a 81 year old female with a PMHX of asthma, COPD, Afib (on pradaxa) and hypothyroidism who presents with a three week history of cough. Cough is productive of yellow/clear sputum and symptoms are associated with decreased appetite but denies CP, palpitations, fever,  headache or recent travel. She notes her  has been suffering from a cold lately. Patient self prescribed doxycycline, which has worekd in the past, however symptoms did not improve. She saw Dr. Tate recently, who gave her a steroid taper on 1/19 but she has not completed it yet. Patient notes desaturating when walking down the hallway during that visit. Of note, she does not get vaccines, including pneumovax and the flu shot.     In the ED, VS were TMax 98.2 HR 77 /85 RR 20 O2 97 on 2L NC. She was given levaquin and 40 of solumedrol. (27 Jan 2017 00:29)    REVIEW OF SYSTEMS: updated  Constitutional: No fever, weight loss, chills or fatigue  Eyes: No eye pain, visual disturbances, or discharge  ENMT:  No difficulty hearing, tinnitus, vertigo; No sinus or throat pain. No epistaxis, dysphagia, dysphonia, hoarseness or odynophagia  Neck: No pain, stiffness or neck swelling.  No masses or deformities  Respiratory: No cough, wheezing, chills or hemoptysis  - COPD  - ILD   - PE   - ASTHMA     - PNEUMONIA  Cardiovascular: No chest pain, dysrhythmia, palpitations, dizziness or edema   - COPD     - CAD   - CHF   - HTN  Gastrointestinal: No abdominal or epigastric pain. No nausea, vomiting or hematemesis; No diarrhea or constipation. No melena or hematochezia. No dysphagia or Icterus.          Genitourinary: No dysuria, frequency, hematuria or incontinence   - CKD/EVIE      - ESRD  Neurological: No headaches, memory loss, loss of strength, numbness or tremors      -DEMENTIA     - STROKE    - SEIZURE  Skin: No itching, burning, rashes or lesions   Lymph Nodes: No enlarged glands  Endocrine: No heat or cold intolerance; No hair loss       - DM     - THYROID DISORDER  Musculoskeletal: No joint pain or swelling; No muscle, back or extremity pain  Psychiatric: No depression, anxiety, mood swings or difficulty sleeping  Heme/Lymph: No easy bruising or bleeding gums         - ANEMIA      - CANCER   -COAGULOPATHY  Allergy and Immunologic: No hives or eczema    PAST MEDICAL & SURGICAL HISTORY:  Atrial fibrillation  Asthma  Bronchitis  No significant past surgical history    FAMILY HISTORY:    SOCIAL HISTORY:      - Tobacco     - ETOH    Allergies    Mustard (Anaphylaxis)  No Known Drug Allergies    Intolerances      Vital Signs Last 24 Hrs  T(C): 36.8, Max: 36.8 (01-26 @ 20:58)  T(F): 98.2, Max: 98.3 (01-27 @ 13:50)  HR: 78 (70 - 80)  BP: 149/77 (149/77 - 178/85)  BP(mean): --  RR: 14 (14 - 20)  SpO2: 93% (93% - 97%)      PHYSICAL EXAM:  Comfortable, no distress  Eyes: PERRL, EOM intact; conjunctiva and sclera clear  Head: Normocephalic;  No Trauma  ENMT: No nasal discharge, hoarseness, cough or hemoptysis.  Airway clear  Neck: Supple; non tender; no masses or deformities.    No JVD  Respiratory: CTA,  - WHEEZING   - RHONCHI  - RALES  - CRACKLES.  Diminished breath sounds  BILATERAL  RIGHT  LEFT  Cardiovascular: Regular rate and rhythm. S1 and S2 Normal; No murmurs, gallops or rubs     - PPM/AICD  Gastrointestinal: Soft non-tender, non-distended; Normal bowel sounds; No hepatosplenomegaly.     -PEG    -  GT   - GARCIA  Genitourinary: No costovertebral angle tenderness. No dysuria  Extremities: AROM, No clubbing, cyanosis or edema    Vascular: Peripheral pulses palpable 2+ bilaterally  Neurological: Alert and responisve to stimuli   Skin: Warm and dry. No obvious rash  Lymph Nodes: No acute cervical or supraclavicular adenopathy  Psychiatric: Cooperative and appropriate mood    DEVICES:  - DENTURES   +IV R / L     - ETUBE   -TRACH   -CTUBE  R / L      LABS:                          12.6   8.0   )-----------( 294      ( 27 Jan 2017 08:04 )             38.4     27 Jan 2017 08:04    139    |  103    |  15     ----------------------------<  138    4.2     |  28     |  0.78     Ca    8.9        27 Jan 2017 08:04  Phos  3.3       27 Jan 2017 08:04  Mg     2.3       27 Jan 2017 08:04    TPro  6.7    /  Alb  3.1    /  TBili  0.6    /  DBili  x      /  AST  43     /  ALT  31     /  AlkPhos  57     26 Jan 2017 17:58      RADIOLOGY & ADDITIONAL STUDIES (The following images were personally reviewed):

## 2017-01-27 NOTE — CONSULT NOTE ADULT - PROBLEM SELECTOR RECOMMENDATION 9
- She has a PA diameter of 4cm on the CTA.   - Her ECHO shows a PASP of 34mm Hg, which is not significantly elevated.   - She doesn't have risk factors in her hx to suggest class 1, 2, 4 or5.  - This mild elevation could be a result of her acute exacerbation of COPD.  - an ECHO could be repeated as an outpatient when she is back to her baseline and not in an exacerbation of COPD.   - Have offered her sleep study testing but she is not interested.     -d/w Dr Rawls.

## 2017-01-27 NOTE — PROGRESS NOTE ADULT - PROBLEM SELECTOR PLAN 3
Stable. HR 71, irregular. 1/27 ECG showed atrial fibrillation, with left axis deviation, inferior infarct age undetermined, ST and T wave abnormality. QTC is 354 (interaction with levaquin and amio)  -c/w Pradaxa 150 mg PO q12h  -c/w digoxin 0.25mg PO qd  -c/w amiodarone 200 daily   -daily AM EKGs

## 2017-01-27 NOTE — DISCHARGE NOTE ADULT - PLAN OF CARE
You were found to have COPD exacerbation You had a COPD exacerbation likely secondary to a rhinovirus infections. You were started on prednisone. You will continue on a prednisone taper as following:   Please follow-up with Dr. Tate upon discharge. You had an echocardiogram mildly elevated pulmonary pressures (34mmHg). It is likely secondary to your current COPD exacerbation. Please follow-up as outpatient with Dr. Tate, It is possible you may need repeat echocardiogram when you are feeling better. Please continue your home medications as prescribed. You have elevated blood pressures while in the hospital. You were started on a medication Lisinopril, the dose was increased to 10mg daily. Please see Dr. Mathias upon discharge for medication optimization. You had an echocardiogram mildly elevated pulmonary pressures (34mmHg) with increased pulmonary artery diameter (4). It is likely secondary to your current COPD exacerbation or a mixed etiology. Please follow-up as outpatient with Dr. Tate and Dr Mathias for further work-up of this cause. You had a COPD exacerbation likely secondary to a rhinovirus infection. You required supplemental oxygen during your stay as your oxygen levels decreased with exertion. You will require oxygen upon discharge as well. Please use your home oxygen. Please continue on Prednisone 40mg daily.   Please follow-up with Dr. Tate upon discharge as well as Dr. Mathias. Please refrain from smoking as it can make your lung function worse. You do not need any more antibiotics as you completed a course of Levaquin. You had elevated blood pressures while in the hospital. You were started on a medication Lisinopril, the dose was increased to 10mg daily. Please see Dr. Mathias upon discharge for medication optimization. You had an echocardiogram mildly elevated pulmonary pressures (34mmHg) with increased pulmonary artery diameter (4). It is likely secondary to your current COPD exacerbation or a mixed etiology. Please follow-up as outpatient with Dr. Tate and Dr Mathias for further work-up of this cause. It was suggested by Dr. Taylorof you obtain EST, stress ECHO or Right Heart Cath with NS loading as outpatient. You had elevated blood pressures while in the hospital. You were started on a medication Lisinopril, the dose was increased to 5mg daily. Please see Dr. Mathias upon discharge for medication optimization.

## 2017-01-27 NOTE — H&P ADULT. - HISTORY OF PRESENT ILLNESS
Patient is a 81 year old female with a PMHX of asthma, COPD, Afib (on pradaxa) and hypothyroidism who presents with a three week history of cough. Cough is productive of yellow/clear sputum and symptoms are associated with decreased appetite but denies CP, palpitations, fever,  headache or recent travel. She notes her  has been suffering from a cold lately. Patient self prescribed doxycycline, which has worekd in the past, however symptoms did not improve. She saw Dr. Tate recently, who gave her a steroid taper on 1/19 but she has not completed it yet. Patient notes desaturating when walking down the hallway during that visit. Of note, she does not get vaccines, including pneumovax and the flu shot.     In the ED, VS were TMax 98.2 HR 77 /85 RR 20 O2 97 on 2L NC. She was given levaquin and 40 of solumedrol.

## 2017-01-27 NOTE — DISCHARGE NOTE ADULT - MEDICATION SUMMARY - MEDICATIONS TO TAKE
I will START or STAY ON the medications listed below when I get home from the hospital:    predniSONE 20 mg oral tablet  -- 2 tab(s) by mouth once a day  -- It is very important that you take or use this exactly as directed.  Do not skip doses or discontinue unless directed by your doctor.  Obtain medical advice before taking any non-prescription drugs as some may affect the action of this medication.  Take with food or milk.    -- Indication: For COPD exacerbation    lisinopril 5 mg oral tablet  -- 1 tab(s) by mouth once a day  -- Do not take this drug if you are pregnant.  It is very important that you take or use this exactly as directed.  Do not skip doses or discontinue unless directed by your doctor.  Some non-prescription drugs may aggravate your condition.  Read all labels carefully.  If a warning appears, check with your doctor before taking.    -- Indication: For Hypertension    amiodarone  -- 200  by mouth once a day  -- Indication: For Atrial fibrillation    digoxin 250 mcg (0.25 mg) oral tablet  -- 1 tab(s) by mouth once a day  -- Indication: For Atrial fibrillation    Pradaxa  -- 150  by mouth 2 times a day  -- Indication: For Atrial fibrillation    Advair Diskus 500 mcg-50 mcg inhalation powder  -- 1 puff(s) inhaled 2 times a day  -- Indication: For COPD exacerbation    Singulair  -- 10  by mouth once a day  -- Indication: For COPD exacerbation    pantoprazole 40 mg oral delayed release tablet  -- 1 tab(s) by mouth once a day (before a meal)  -- Indication: For gerd    Synthroid  -- 75  by mouth once a day  -- Indication: For Hypothyroid

## 2017-01-27 NOTE — DISCHARGE NOTE ADULT - CARE PROVIDERS DIRECT ADDRESSES
,[email or web address],[email or web address]

## 2017-01-27 NOTE — DISCHARGE NOTE ADULT - CARE PROVIDER_API CALL
Ethan Tate (MD), Internal Medicine  [address] [facility]   [address] [place] [address]  Phone: [phone]  Fax: [phone] Ethan Tate), Internal Medicine  [address] [facility]   [address]  [place] [address]  Phone: [phone]  Fax: [phone]    Maico Mathias), Cardiovascular Medicine  [address] [place] [address]  Phone: [phone]  Fax: [phone]

## 2017-01-27 NOTE — PROGRESS NOTE ADULT - SUBJECTIVE AND OBJECTIVE BOX
O/N Events:MAINOR  Subjective: Patient continues to have productive cough with clear sputum as per patient. Denies CP, SOB, palpitations, PND, n/v/d, fever, chills, LE edema. All other ROS noncontributory.     HPI  Pt is a 82 yo F w PMHx of COPD, asthma, afib on Pradaxa and amiodarone, hypothyroidism and recent COPD exacerbation presenting with worsening cough and SOB after failing outpatient treatment with Augmentin x 10days. Pt was seen at Dr. Tate’s office for persistent symptoms and started on prednisone 40mg yesterday.  She was noted to be saturating at 88% (her baseline low to mid 90’s per patient) was prescribed steroid taper,  Pt reports taking one dose and then presented to ED today. Patient reports symptoms started around January 4th. Patient had increased productive cough and RAE, only able to walk 1-2 blocks before getting SOB, previously able to walk 4-5 blocks. Patient reports getting bronchitis 2-3 times a year, usually takes doxy, which helps, but this time her symptoms continued to get worse. In ED she was treated with Levaquin and 40mg of solumedrol, transferred to inpatient unit.     PMH:   PAST MEDICAL & SURGICAL HISTORY:  Atrial fibrillation  Asthma  Bronchitis  No significant past surgical history      VITALS  Vital Signs Last 24 Hrs  T(C): 36.8, Max: 36.8 (01-26 @ 20:58)  T(F): 98.2, Max: 98.3 (01-27 @ 13:50)  HR: 78 (70 - 82)  BP: 149/77 (149/77 - 178/85)  BP(mean): --  RR: 14 (14 - 22)  SpO2: 93% (88% - 97%)    I&O's Summary      CAPILLARY BLOOD GLUCOSE  116 (27 Jan 2017 12:19)  127 (27 Jan 2017 07:41)      PHYSICAL EXAM  General: A&Ox 3; NAD pt speaking full sentences, not using accessory muscles.   Head: NC/AT;   Eyes: PERRL; EOMI; anicteric sclera  Neck: Supple; no JVD  Respiratory: CTA B/L; no wheezes/crackles/rales auscultated w/ good air movement  Cardiovascular: Regular rhythm/rate; S1/S2; no gallops or murmurs auscultated  Gastrointestinal: Soft; NTND w/out rebound tenderness or guarding; bowel sounds normal  Extremities: WWP; no edema or cyanosis; radial/pedal pulses palpable  Neurological:  CNII-XII grossly intact; no obvious focal deficits    MEDICATIONS  (STANDING):  amiodarone    Tablet 200milliGRAM(s) Oral daily  digoxin     Tablet 0.25milliGRAM(s) Oral daily  montelukast 10milliGRAM(s) Oral daily  levothyroxine 75MICROGram(s) Oral daily  pantoprazole    Tablet 40milliGRAM(s) Oral before breakfast  levoFLOXacin IVPB 500milliGRAM(s) IV Intermittent every 24 hours  methylPREDNISolone sodium succinate Injectable 40milliGRAM(s) IV Push every 8 hours  insulin lispro (HumaLOG) corrective regimen sliding scale  SubCutaneous Before meals and at bedtime  dabigatran 150milliGRAM(s) Oral every 12 hours  ALBUTerol/ipratropium for Nebulization 3milliLiter(s) Nebulizer every 4 hours    MEDICATIONS  (PRN):      LABS                        12.6   8.0   )-----------( 294      ( 27 Jan 2017 08:04 )             38.4     27 Jan 2017 08:04    139    |  103    |  15     ----------------------------<  138    4.2     |  28     |  0.78     Ca    8.9        27 Jan 2017 08:04  Phos  3.3       27 Jan 2017 08:04  Mg     2.3       27 Jan 2017 08:04    TPro  6.7    /  Alb  3.1    /  TBili  0.6    /  DBili  x      /  AST  43     /  ALT  31     /  AlkPhos  57     26 Jan 2017 17:58    LIVER FUNCTIONS - ( 26 Jan 2017 17:58 )  Alb: 3.1 g/dL / Pro: 6.7 g/dL / ALK PHOS: 57 U/L / ALT: 31 U/L / AST: 43 U/L / GGT: x               CARDIAC MARKERS ( 26 Jan 2017 17:58 )  <0.015 ng/mL / x     / 41 U/L / x     / 1.1 ng/mL

## 2017-01-27 NOTE — H&P ADULT. - PROBLEM SELECTOR PLAN 1
Failed outpatient treatment with prednisone taper. CT notable for mild pulmonary venous congestion and hyperaeration but no PE. Seen by Dr. Keller (covering Dr. Tate), who recommends solumedrol 40 q8, levaquin, echo, PPI, aspiration precautions  -Trend Qtc while giving levaquin and amiodarone or consider switching to azithromycin  -ISS while on solumedrol Failed outpatient treatment with prednisone taper. CT notable for mild pulmonary venous congestion and hyperaeration but no PE. Seen by Dr. Keller (covering Dr. Tate), who recommends solumedrol 40 q8, levaquin, echo, PPI, aspiration precautions. Pt was in ED with similar complaints last week and was discharged on augmentin but is unsure if she took it  -Trend Qtc while giving levaquin and amiodarone or consider switching to azithromycin  -ISS while on solumedrol

## 2017-01-27 NOTE — DISCHARGE NOTE ADULT - CARE PLAN
Principal Discharge DX:	COPD exacerbation  Goal:	You were found to have COPD exacerbation  Instructions for follow-up, activity and diet:	You had a COPD exacerbation likely secondary to a rhinovirus infections. You were started on prednisone. You will continue on a prednisone taper as following:   Please follow-up with Dr. Tate upon discharge.  Secondary Diagnosis:	Pulmonary hypertension  Instructions for follow-up, activity and diet:	You had an echocardiogram mildly elevated pulmonary pressures (34mmHg). It is likely secondary to your current COPD exacerbation. Please follow-up as outpatient with Dr. Tate, It is possible you may need repeat echocardiogram when you are feeling better.  Secondary Diagnosis:	Atrial fibrillation  Instructions for follow-up, activity and diet:	Please continue your home medications as prescribed. Principal Discharge DX:	COPD exacerbation  Goal:	You were found to have COPD exacerbation  Instructions for follow-up, activity and diet:	You had a COPD exacerbation likely secondary to a rhinovirus infections. You were started on prednisone. You will continue on a prednisone taper as following:   Please follow-up with Dr. Tate upon discharge.  Secondary Diagnosis:	Pulmonary hypertension  Instructions for follow-up, activity and diet:	You had an echocardiogram mildly elevated pulmonary pressures (34mmHg) with increased pulmonary artery diameter (4). It is likely secondary to your current COPD exacerbation or a mixed etiology. Please follow-up as outpatient with Dr. Tate and Dr Mathias for further work-up of this cause.  Secondary Diagnosis:	Atrial fibrillation  Instructions for follow-up, activity and diet:	Please continue your home medications as prescribed.  Secondary Diagnosis:	Hypertension  Instructions for follow-up, activity and diet:	You have elevated blood pressures while in the hospital. You were started on a medication Lisinopril, the dose was increased to 10mg daily. Please see Dr. Mathias upon discharge for medication optimization. Principal Discharge DX:	COPD exacerbation  Goal:	You were found to have COPD exacerbation  Instructions for follow-up, activity and diet:	You had a COPD exacerbation likely secondary to a rhinovirus infection. You required supplemental oxygen during your stay as your oxygen levels decreased with exertion. You will require oxygen upon discharge as well. Please use your home oxygen. Please continue on Prednisone 40mg daily.   Please follow-up with Dr. Tate upon discharge as well as Dr. Mathias. Please refrain from smoking as it can make your lung function worse. You do not need any more antibiotics as you completed a course of Levaquin.  Secondary Diagnosis:	Pulmonary hypertension  Instructions for follow-up, activity and diet:	You had an echocardiogram mildly elevated pulmonary pressures (34mmHg) with increased pulmonary artery diameter (4). It is likely secondary to your current COPD exacerbation or a mixed etiology. Please follow-up as outpatient with Dr. Tate and Dr Mathias for further work-up of this cause. It was suggested by Dr. Taylorof you obtain EST, stress ECHO or Right Heart Cath with NS loading as outpatient.  Secondary Diagnosis:	Atrial fibrillation  Instructions for follow-up, activity and diet:	Please continue your home medications as prescribed.  Secondary Diagnosis:	Hypertension  Instructions for follow-up, activity and diet:	You had elevated blood pressures while in the hospital. You were started on a medication Lisinopril, the dose was increased to 10mg daily. Please see Dr. Mathias upon discharge for medication optimization. Principal Discharge DX:	COPD exacerbation  Goal:	You were found to have COPD exacerbation  Instructions for follow-up, activity and diet:	You had a COPD exacerbation likely secondary to a rhinovirus infection. You required supplemental oxygen during your stay as your oxygen levels decreased with exertion. You will require oxygen upon discharge as well. Please use your home oxygen. Please continue on Prednisone 40mg daily.   Please follow-up with Dr. Tate upon discharge as well as Dr. Mathias. Please refrain from smoking as it can make your lung function worse. You do not need any more antibiotics as you completed a course of Levaquin.  Secondary Diagnosis:	Pulmonary hypertension  Instructions for follow-up, activity and diet:	You had an echocardiogram mildly elevated pulmonary pressures (34mmHg) with increased pulmonary artery diameter (4). It is likely secondary to your current COPD exacerbation or a mixed etiology. Please follow-up as outpatient with Dr. Tate and Dr Mathias for further work-up of this cause. It was suggested by Dr. Taylorof you obtain EST, stress ECHO or Right Heart Cath with NS loading as outpatient.  Secondary Diagnosis:	Atrial fibrillation  Instructions for follow-up, activity and diet:	Please continue your home medications as prescribed.  Secondary Diagnosis:	Hypertension  Instructions for follow-up, activity and diet:	You had elevated blood pressures while in the hospital. You were started on a medication Lisinopril, the dose was increased to 5mg daily. Please see Dr. Mathias upon discharge for medication optimization.

## 2017-01-27 NOTE — PROGRESS NOTE ADULT - PROBLEM SELECTOR PLAN 2
Pt with known asthma, only on advair and singulair at home.  Sees Dr. Tate.   -c/w Solumedrol 40 mg IV 48h  -c/w Monetlukast 10mg, PO, qd.   -c/w Duoneb q6h

## 2017-01-27 NOTE — CONSULT NOTE ADULT - ATTENDING COMMENTS
I have discussed the patient with the house officers and concur with the management and plan of action

## 2017-01-27 NOTE — DISCHARGE NOTE ADULT - HOSPITAL COURSE
Pt is a 82 yo F w PMHx of COPD, asthma, afib on Pradaxa and amiodarone, hypothyroidism and recent COPD exacerbation presenting with worsening cough and SOB after failing outpatient treatment with Augmentin x 10days. Pt was seen at Dr. Tate’s office for persistent symptoms and started on prednisone 40mg yesterday and counseled to go to ED for desaturation on RA while ambulating. She was found to have COPD exacerbation upon admission with +Rhinovirus.  She was treated with Levaquin started on IV solumedrol 40mg IV q 8 hrs. She had CT that showed moderate emphysema changes. Echo was wnl EF >60% with borderline pulmonary htn (34 mmhg). She was medically optimized for discharge home. Pt is a 80 yo F w PMHx of COPD, asthma, afib on Pradaxa and amiodarone, hypothyroidism and recent COPD exacerbation presenting with worsening cough and SOB after failing outpatient treatment with Augmentin x 10days. Pt was seen at Dr. Tate’s office for persistent symptoms and started on prednisone 40mg yesterday and counseled to go to ED for desaturation on RA while ambulating. She was found to have COPD exacerbation upon admission with +Rhinovirus.  She was treated with Levaquin started on IV solumedrol 40mg IV q 8 hrs. She had CT that showed moderate emphysema changes. Echo was wnl EF >60% with borderline pulmonary htn (34 mmhg). Dr Rawls and Dr. Velazquez evaluated patient. It was agreed that RAE may not be entirely 2/2 COPD.  Pulmonary artery was found to be grossly dilated (4) while ECHO showed mildy elevated PAS. There was no evidence of combined pulmonary fibrosis and emphysema (CPFE) on the CT Scan- an entity that may be associated with pulmonary hTN. D-Dimer was <150 and negative. PE was low suspicion. It was suggested that the rest of the work up---EST, stress ECHO or RHC with NS be done a out patient. Dr. Mathias was made aware of patient and wished to carry out outpatient work-up Pt is a 82 yo F w PMHx of COPD, asthma, afib on Pradaxa and amiodarone, hypothyroidism and recent COPD exacerbation presenting with worsening cough and SOB after failing outpatient treatment with Augmentin x 10days. Pt was seen at Dr. Tate’s office for persistent symptoms and started on prednisone 40mg yesterday and counseled to go to ED for desaturation on RA while ambulating. She was found to have COPD exacerbation upon admission with +Rhinovirus.  She was treated with Levaquin and started on IV solumedrol 40mg IV q 8 hrs. She had CT that showed moderate emphysema changes. Echo was wnl EF >60% with borderline pulmonary htn (34 mmhg) and dilated pulmonary artery. Dr Rawls and Dr. Velazquez evaluated patient. It was agreed that RAE may not be entirely 2/2 COPD.  Pulmonary artery was found to be grossly dilated (4) while ECHO showed mildy elevated PAS. There was no evidence of combined pulmonary fibrosis and emphysema (CPFE) on the CT Scan- an entity that may be associated with pulmonary hTN. D-Dimer was <150 and negative. PE was low suspicion. It was suggested that the rest of the work up---EST, stress ECHO or RHC with NS be done a out patient. Dr. Mathias was made aware of patient and wished to carry out outpatient work-up. Patient was noted to be desaturating while ambulating and will be discharged on home oxygen. Her SOB improved and she was medically optimized to be discharged home with continued work-up as outpatient.

## 2017-01-27 NOTE — PROGRESS NOTE ADULT - PROBLEM SELECTOR PLAN 1
Pt with increased dyspnea and sputum production. COPD exacerbation 2/2 to Rhino virus (pos RapRVP).  Pt afebrile (97.4F), WBC : 8, Blood cx showed no growth to date. Lungs CTAB.  Pt on suppl O2, 2L NC with adequate saturation. CT chest remarkable for moderate to severe emphysematous changes  - Continue Levaquin 500ml IV q24h (1/27-).  -DuoNebs q6 hrs PRN   - Solumedrol 40 mg IV 8h  - consider bedside spirometry for COPD management optimization.  -f/u echo

## 2017-01-28 DIAGNOSIS — I10 ESSENTIAL (PRIMARY) HYPERTENSION: ICD-10-CM

## 2017-01-28 PROBLEM — J45.909 UNSPECIFIED ASTHMA, UNCOMPLICATED: Chronic | Status: ACTIVE | Noted: 2017-01-15

## 2017-01-28 PROBLEM — J40 BRONCHITIS, NOT SPECIFIED AS ACUTE OR CHRONIC: Chronic | Status: ACTIVE | Noted: 2017-01-15

## 2017-01-28 PROBLEM — I48.91 UNSPECIFIED ATRIAL FIBRILLATION: Chronic | Status: ACTIVE | Noted: 2017-01-15

## 2017-01-28 LAB
ANION GAP SERPL CALC-SCNC: 7 MMOL/L — LOW (ref 9–16)
BUN SERPL-MCNC: 21 MG/DL — SIGNIFICANT CHANGE UP (ref 7–23)
CALCIUM SERPL-MCNC: 8.5 MG/DL — SIGNIFICANT CHANGE UP (ref 8.5–10.5)
CHLORIDE SERPL-SCNC: 103 MMOL/L — SIGNIFICANT CHANGE UP (ref 96–108)
CO2 SERPL-SCNC: 31 MMOL/L — SIGNIFICANT CHANGE UP (ref 22–31)
CREAT SERPL-MCNC: 0.9 MG/DL — SIGNIFICANT CHANGE UP (ref 0.5–1.3)
GLUCOSE SERPL-MCNC: 132 MG/DL — HIGH (ref 70–99)
HCT VFR BLD CALC: 38.5 % — SIGNIFICANT CHANGE UP (ref 34.5–45)
HGB BLD-MCNC: 12.6 G/DL — SIGNIFICANT CHANGE UP (ref 11.5–15.5)
MAGNESIUM SERPL-MCNC: 2.5 MG/DL — HIGH (ref 1.6–2.4)
MCHC RBC-ENTMCNC: 30 PG — SIGNIFICANT CHANGE UP (ref 27–34)
MCHC RBC-ENTMCNC: 32.7 G/DL — SIGNIFICANT CHANGE UP (ref 32–36)
MCV RBC AUTO: 91.7 FL — SIGNIFICANT CHANGE UP (ref 80–100)
PHOSPHATE SERPL-MCNC: 3.3 MG/DL — SIGNIFICANT CHANGE UP (ref 2.5–4.5)
PLATELET # BLD AUTO: 321 K/UL — SIGNIFICANT CHANGE UP (ref 150–400)
POTASSIUM SERPL-MCNC: 4.5 MMOL/L — SIGNIFICANT CHANGE UP (ref 3.5–5.3)
POTASSIUM SERPL-SCNC: 4.5 MMOL/L — SIGNIFICANT CHANGE UP (ref 3.5–5.3)
RBC # BLD: 4.2 M/UL — SIGNIFICANT CHANGE UP (ref 3.8–5.2)
RBC # FLD: 16.1 % — SIGNIFICANT CHANGE UP (ref 10.3–16.9)
SODIUM SERPL-SCNC: 141 MMOL/L — SIGNIFICANT CHANGE UP (ref 135–145)
WBC # BLD: 16.4 K/UL — HIGH (ref 3.8–10.5)
WBC # FLD AUTO: 16.4 K/UL — HIGH (ref 3.8–10.5)

## 2017-01-28 RX ORDER — LISINOPRIL 2.5 MG/1
2.5 TABLET ORAL DAILY
Qty: 0 | Refills: 0 | Status: DISCONTINUED | OUTPATIENT
Start: 2017-01-28 | End: 2017-01-30

## 2017-01-28 RX ORDER — MINERAL OIL
30 OIL (ML) MISCELLANEOUS AT BEDTIME
Qty: 0 | Refills: 0 | Status: DISCONTINUED | OUTPATIENT
Start: 2017-01-28 | End: 2017-01-31

## 2017-01-28 RX ADMIN — Medication 75 MICROGRAM(S): at 06:09

## 2017-01-28 RX ADMIN — PANTOPRAZOLE SODIUM 40 MILLIGRAM(S): 20 TABLET, DELAYED RELEASE ORAL at 07:10

## 2017-01-28 RX ADMIN — Medication 3 MILLILITER(S): at 18:04

## 2017-01-28 RX ADMIN — MONTELUKAST 10 MILLIGRAM(S): 4 TABLET, CHEWABLE ORAL at 12:45

## 2017-01-28 RX ADMIN — LISINOPRIL 2.5 MILLIGRAM(S): 2.5 TABLET ORAL at 18:04

## 2017-01-28 RX ADMIN — DABIGATRAN ETEXILATE MESYLATE 150 MILLIGRAM(S): 150 CAPSULE ORAL at 09:46

## 2017-01-28 RX ADMIN — Medication 30 MILLILITER(S): at 00:27

## 2017-01-28 RX ADMIN — Medication 3 MILLILITER(S): at 14:02

## 2017-01-28 RX ADMIN — Medication 40 MILLIGRAM(S): at 09:46

## 2017-01-28 RX ADMIN — Medication 30 MILLILITER(S): at 21:10

## 2017-01-28 RX ADMIN — Medication 3 MILLILITER(S): at 09:46

## 2017-01-28 RX ADMIN — Medication 3 MILLILITER(S): at 22:33

## 2017-01-28 RX ADMIN — Medication 40 MILLIGRAM(S): at 01:50

## 2017-01-28 RX ADMIN — Medication 3 MILLILITER(S): at 06:09

## 2017-01-28 RX ADMIN — Medication 3 MILLILITER(S): at 01:50

## 2017-01-28 RX ADMIN — AMIODARONE HYDROCHLORIDE 200 MILLIGRAM(S): 400 TABLET ORAL at 06:10

## 2017-01-28 RX ADMIN — DABIGATRAN ETEXILATE MESYLATE 150 MILLIGRAM(S): 150 CAPSULE ORAL at 22:31

## 2017-01-28 RX ADMIN — Medication 40 MILLIGRAM(S): at 18:04

## 2017-01-28 RX ADMIN — Medication 0.25 MILLIGRAM(S): at 06:10

## 2017-01-28 NOTE — PROGRESS NOTE ADULT - SUBJECTIVE AND OBJECTIVE BOX
INTERVAL HPI/OVERNIGHT EVENTS: No events overnight. Patient requested mineral oil for constipation. Today, patient denies HA, CP, SOB, palp, abd pain, n/v/d/c.     VITAL SIGNS:  T(F): 97.9  HR: 76  BP: 155/81  RR: 15  SpO2: 97%  Wt(kg): --    PHYSICAL EXAM:  General: A&Ox 3; NAD pt speaking full sentences, not using accessory muscles.   Head: NC/AT;   Eyes: PERRL; EOMI; anicteric sclera  Neck: Supple; no JVD  Respiratory: CTA B/L; no wheezes/crackles/rales auscultated w/ good air movement  Cardiovascular: Regular rhythm/rate; S1/S2; no gallops or murmurs auscultated  Gastrointestinal: Soft; NTND w/out rebound tenderness or guarding; bowel sounds normal  Extremities: WWP; no edema or cyanosis; radial/pedal pulses palpable  Neurological:  CNII-XII grossly intact; no obvious focal deficits    MEDICATIONS  (STANDING):  amiodarone    Tablet 200milliGRAM(s) Oral daily  digoxin     Tablet 0.25milliGRAM(s) Oral daily  montelukast 10milliGRAM(s) Oral daily  levothyroxine 75MICROGram(s) Oral daily  pantoprazole    Tablet 40milliGRAM(s) Oral before breakfast  levoFLOXacin IVPB 500milliGRAM(s) IV Intermittent every 24 hours  methylPREDNISolone sodium succinate Injectable 40milliGRAM(s) IV Push every 8 hours  insulin lispro (HumaLOG) corrective regimen sliding scale  SubCutaneous Before meals and at bedtime  dabigatran 150milliGRAM(s) Oral every 12 hours  ALBUTerol/ipratropium for Nebulization 3milliLiter(s) Nebulizer every 4 hours  mineral oil 30milliLiter(s) Oral daily    MEDICATIONS  (PRN):      Allergies    Mustard (Anaphylaxis)  No Known Drug Allergies    Intolerances        LABS:                        12.6   16.4  )-----------( 321      ( 28 Jan 2017 06:43 )             38.5     28 Jan 2017 06:43    141    |  103    |  21     ----------------------------<  132    4.5     |  31     |  0.90     Ca    8.5        28 Jan 2017 06:43  Phos  3.3       28 Jan 2017 06:43  Mg     2.5       28 Jan 2017 06:43    TPro  6.7    /  Alb  3.1    /  TBili  0.6    /  DBili  x      /  AST  43     /  ALT  31     /  AlkPhos  57     26 Jan 2017 17:58          RADIOLOGY & ADDITIONAL TESTS:

## 2017-01-28 NOTE — PROGRESS NOTE ADULT - SUBJECTIVE AND OBJECTIVE BOX
Interventional, Pulmonary, Critical, Chest Special Procedures.    Pt was seen and fully examined by myself.     Time spent with patient in minutes:67 extensive discussion c pt and her family bedside  Patient is a 81y old  Female who presents with a chief complaint of SOB (27 Jan 2017 00:29) the patient feels and appears better, less cough , less SOB      HPI:  Patient is a 81 year old female with a PMHX of asthma, COPD, Afib (on pradaxa) and hypothyroidism who presents with a three week history of cough. Cough is productive of yellow/clear sputum and symptoms are associated with decreased appetite but denies CP, palpitations, fever,  headache or recent travel. She notes her  has been suffering from a cold lately. Patient self prescribed doxycycline, which has worekd in the past, however symptoms did not improve. She saw Dr. Tate recently, who gave her a steroid taper on 1/19 but she has not completed it yet. Patient notes desaturating when walking down the hallway during that visit. Of note, she does not get vaccines, including pneumovax and the flu shot.     In the ED, VS were TMax 98.2 HR 77 /85 RR 20 O2 97 on 2L NC. She was given levaquin and 40 of solumedrol. (27 Jan 2017 00:29)    REVIEW OF SYSTEMS:  Constitutional: No fever, weight loss, chills or fatigue  Eyes: No eye pain, visual disturbances, or discharge  ENMT:  No difficulty hearing, tinnitus, vertigo; No sinus or throat pain. No epistaxis, dysphagia, dysphonia, hoarseness or odynophagia  Neck: No pain, stiffness or neck swelling.  No masses or deformities  Respiratory: No cough, wheezing, chills or hemoptysis  - COPD  - ILD   - PE   - ASTHMA     - PNEUMONIA  Cardiovascular: No chest pain, dysrhythmia, palpitations, dizziness or edema   - COPD     - CAD   - CHF   - HTN  Gastrointestinal: No abdominal or epigastric pain. No nausea, vomiting or hematemesis; No diarrhea or constipation. No melena or hematochezia. No dysphagia or Icterus.          Genitourinary: No dysuria, frequency, hematuria or incontinence   - CKD/EVIE      - ESRD  Neurological: No headaches, memory loss, loss of strength, numbness or tremors      -DEMENTIA     - STROKE    - SEIZURE  Skin: No itching, burning, rashes or lesions   Lymph Nodes: No enlarged glands  Endocrine: No heat or cold intolerance; No hair loss       - DM     - THYROID DISORDER  Musculoskeletal: No joint pain or swelling; No muscle, back or extremity pain  Psychiatric: No depression, anxiety, mood swings or difficulty sleeping  Heme/Lymph: No easy bruising or bleeding gums         - ANEMIA      - CANCER   -COAGULOPATHY  Allergy and Immunologic: No hives or eczema    PAST MEDICAL & SURGICAL HISTORY:  Atrial fibrillation  Asthma  Bronchitis  No significant past surgical history    FAMILY HISTORY:    SOCIAL HISTORY:      - Tobacco     - ETOH    Allergies    Mustard (Anaphylaxis)  No Known Drug Allergies    Intolerances      Vital Signs Last 24 Hrs  T(C): 36.6, Max: 36.6 (01-27 @ 21:00)  T(F): 97.9, Max: 97.9 (01-28 @ 10:22)  HR: 76 (73 - 78)  BP: 155/81 (144/85 - 177/94)  BP(mean): --  RR: 15 (15 - 18)  SpO2: 97% (95% - 97%)      PHYSICAL EXAM:  more Comfortable, no distress  Eyes: PERRL, EOM intact; conjunctiva and sclera clear  Head: Normocephalic;  No Trauma  ENMT: No nasal discharge,+ hoarseness,+ cough no hemoptysis.  Neck: Supple; non tender; no masses or deformities.    No JVD  Respiratory:   - WHEEZING    RHONCHI  - RALES  + CRACKLES.  Diminished breath sounds  BILATERAL  RIGHT  LEFT bases  Cardiovascular: Regular rate and rhythm. S1 and S2 Normal; No murmurs, gallops or rubs     - PPM/AICD  Gastrointestinal: Soft non-tender, non-distended; Normal bowel sounds; No hepatosplenomegaly.     -PEG    -  GT   - GARCIA  Genitourinary: No costovertebral angle tenderness. No dysuria  Extremities: AROM, No clubbing, cyanosis or edema    Vascular: Peripheral pulses palpable 2+ bilaterally  Neurological: Alert and responisve to stimuli   Skin: Warm and dry. No obvious rash  Lymph Nodes: No acute cervical or supraclavicular adenopathy  Psychiatric: Cooperative and appropriate mood    DEVICES:  - DENTURES   +IV R / L     - ETUBE   -TRACH   -CTUBE  R / L      LABS:                          12.6   16.4  )-----------( 321      ( 28 Jan 2017 06:43 )             38.5     28 Jan 2017 06:43    141    |  103    |  21     ----------------------------<  132    4.5     |  31     |  0.90     Ca    8.5        28 Jan 2017 06:43  Phos  3.3       28 Jan 2017 06:43  Mg     2.5       28 Jan 2017 06:43    TPro  6.7    /  Alb  3.1    /  TBili  0.6    /  DBili  x      /  AST  43     /  ALT  31     /  AlkPhos  57     26 Jan 2017 17:58      EXAM:  ECHOCARDIOGRAM (CARDIOL)       PROCEDURE DATE:  01/27/2017                    INTERPRETATION:  Patient Height: 170.0 cm  Patient Weight: 65.0 kg  Systolic Pressure: 171 mmHg  Diastolic Pressure: 78 mmHg  BSA: 1.8 m^2  Interpretation Summary  There is mild concentric left ventricular hypertrophy.The left   ventricular wall   motion is normal. The left ventricular ejection fraction is estimated to   be   60-65%  The left atrium is mildly dilated. The right atrium is   dilated.The   right ventricle is normal in size and function.There is mild to moderate   aortic regurgitation.No hemodynamically significant valvular aortic   stenosis.There is trace mitral regurgitation.There is trace to mild   tricuspid   regurgitation.There is borderline pulmonary hypertension. The pulmonary   artery   systolic pressure is estimated to be 34 mmHg.  No pulmonic regurgitation   noted. There is no pulmonic stenosis.No aortic root dilatation.The   inferior   vena cava is normal in size (<2.1 cm) with normal inspiratory collapse   (>50%)   consistent with normal right atrial pressure.  There is no pericardial   effusion.  Procedure Details  A complete two-dimensional transthoracic echocardiogram was performed (2D,  M-mode, spectral and color flow doppler).  Study Quality: Good.  Left Ventricle  There is mild concentric left ventricular hypertrophy.  The left ventricular wall motion is normal.  The left ventricular ejection fraction is normal.  The left ventricular ejection fraction is estimated to be 60-65%  Left Atrium  The left atrium is mildly dilated.  The left atrial volume index is 35 cc/m2 (normal <34cc/m2)  Right Atrium  The right atrium is dilated.  Right Ventricle  The right ventricle is normal in size and function.  The tricuspid annular plane systolic excursion (TAPSE) is 18 mm (normal   17mm  or higher).  Aortic Valve  The aortic valve is trileaflet.  There is mild to moderate aortic regurgitation.  Pressure half time measures 745 msec.  No hemodynamically significant valvular aortic stenosis.  Mitral Valve  There is trace mitral regurgitation.  Tricuspid Valve  There is trace to mild tricuspid regurgitation.  RADIOLOGY & ADDITIONAL STUDIES (The following images were personally reviewed):

## 2017-01-28 NOTE — PROGRESS NOTE ADULT - PROBLEM SELECTOR PLAN 1
Pt with increased dyspnea and sputum production. COPD exacerbation 2/2 to Rhino virus (pos RapRVP).  Pt afebrile (97.4F), WBC : 8, Blood cx showed no growth to date. Lungs CTAB.  Pt on suppl O2, 2L NC with adequate saturation. CT chest remarkable for moderate to severe emphysematous changes  - Continue Levaquin 500ml IV q24h (1/27-).  - Duonebs q6 hrs PRN   - c/w Solumedrol 40 mg IV 8h  - consider bedside spirometry for COPD management optimization.  - Echo EF 60-65%  - Dr. Rawls consulted for pulm HTN eval

## 2017-01-28 NOTE — PROGRESS NOTE ADULT - PROBLEM SELECTOR PLAN 2
Pt with known asthma, only on advair and singulair at home.  Sees Dr. Tate.   -c/w Solumedrol 40 mg IV q8h  -c/w Monetlukast 10mg, PO, qd  -c/w Duoneb q6h

## 2017-01-29 LAB
ANION GAP SERPL CALC-SCNC: 9 MMOL/L — SIGNIFICANT CHANGE UP (ref 9–16)
BUN SERPL-MCNC: 21 MG/DL — SIGNIFICANT CHANGE UP (ref 7–23)
CALCIUM SERPL-MCNC: 8.8 MG/DL — SIGNIFICANT CHANGE UP (ref 8.5–10.5)
CHLORIDE SERPL-SCNC: 103 MMOL/L — SIGNIFICANT CHANGE UP (ref 96–108)
CO2 SERPL-SCNC: 28 MMOL/L — SIGNIFICANT CHANGE UP (ref 22–31)
CREAT SERPL-MCNC: 0.87 MG/DL — SIGNIFICANT CHANGE UP (ref 0.5–1.3)
GLUCOSE SERPL-MCNC: 119 MG/DL — HIGH (ref 70–99)
HCT VFR BLD CALC: 37.2 % — SIGNIFICANT CHANGE UP (ref 34.5–45)
HGB BLD-MCNC: 12.3 G/DL — SIGNIFICANT CHANGE UP (ref 11.5–15.5)
MAGNESIUM SERPL-MCNC: 2.4 MG/DL — SIGNIFICANT CHANGE UP (ref 1.6–2.4)
MCHC RBC-ENTMCNC: 30.3 PG — SIGNIFICANT CHANGE UP (ref 27–34)
MCHC RBC-ENTMCNC: 33.1 G/DL — SIGNIFICANT CHANGE UP (ref 32–36)
MCV RBC AUTO: 91.6 FL — SIGNIFICANT CHANGE UP (ref 80–100)
PLATELET # BLD AUTO: 275 K/UL — SIGNIFICANT CHANGE UP (ref 150–400)
POTASSIUM SERPL-MCNC: 4.2 MMOL/L — SIGNIFICANT CHANGE UP (ref 3.5–5.3)
POTASSIUM SERPL-SCNC: 4.2 MMOL/L — SIGNIFICANT CHANGE UP (ref 3.5–5.3)
RBC # BLD: 4.06 M/UL — SIGNIFICANT CHANGE UP (ref 3.8–5.2)
RBC # FLD: 16.2 % — SIGNIFICANT CHANGE UP (ref 10.3–16.9)
SODIUM SERPL-SCNC: 140 MMOL/L — SIGNIFICANT CHANGE UP (ref 135–145)
WBC # BLD: 16.4 K/UL — HIGH (ref 3.8–10.5)
WBC # FLD AUTO: 16.4 K/UL — HIGH (ref 3.8–10.5)

## 2017-01-29 RX ORDER — LISINOPRIL 2.5 MG/1
2.5 TABLET ORAL ONCE
Qty: 0 | Refills: 0 | Status: COMPLETED | OUTPATIENT
Start: 2017-01-29 | End: 2017-01-29

## 2017-01-29 RX ADMIN — Medication 40 MILLIGRAM(S): at 02:14

## 2017-01-29 RX ADMIN — LISINOPRIL 2.5 MILLIGRAM(S): 2.5 TABLET ORAL at 22:58

## 2017-01-29 RX ADMIN — Medication 30 MILLILITER(S): at 21:42

## 2017-01-29 RX ADMIN — Medication 3 MILLILITER(S): at 02:15

## 2017-01-29 RX ADMIN — LISINOPRIL 2.5 MILLIGRAM(S): 2.5 TABLET ORAL at 06:59

## 2017-01-29 RX ADMIN — DABIGATRAN ETEXILATE MESYLATE 150 MILLIGRAM(S): 150 CAPSULE ORAL at 10:13

## 2017-01-29 RX ADMIN — Medication 75 MICROGRAM(S): at 06:59

## 2017-01-29 RX ADMIN — Medication 3 MILLILITER(S): at 13:32

## 2017-01-29 RX ADMIN — PANTOPRAZOLE SODIUM 40 MILLIGRAM(S): 20 TABLET, DELAYED RELEASE ORAL at 07:00

## 2017-01-29 RX ADMIN — Medication 3 MILLILITER(S): at 06:00

## 2017-01-29 RX ADMIN — Medication 3 MILLILITER(S): at 10:13

## 2017-01-29 RX ADMIN — AMIODARONE HYDROCHLORIDE 200 MILLIGRAM(S): 400 TABLET ORAL at 06:58

## 2017-01-29 RX ADMIN — Medication 40 MILLIGRAM(S): at 10:14

## 2017-01-29 RX ADMIN — Medication 3 MILLILITER(S): at 17:08

## 2017-01-29 RX ADMIN — MONTELUKAST 10 MILLIGRAM(S): 4 TABLET, CHEWABLE ORAL at 12:08

## 2017-01-29 RX ADMIN — Medication 0.25 MILLIGRAM(S): at 06:59

## 2017-01-29 RX ADMIN — DABIGATRAN ETEXILATE MESYLATE 150 MILLIGRAM(S): 150 CAPSULE ORAL at 21:41

## 2017-01-29 RX ADMIN — Medication 40 MILLIGRAM(S): at 22:58

## 2017-01-29 RX ADMIN — Medication 3 MILLILITER(S): at 22:59

## 2017-01-29 NOTE — PROGRESS NOTE ADULT - SUBJECTIVE AND OBJECTIVE BOX
Interventional, Pulmonary, Critical, Chest Special Procedures.    Pt was seen and fully examined by myself.     Time spent with patient in minutes:37    Patient is a 81y old  Female who presents with a chief complaint of SOB (27 Jan 2017 19:51)The patient reports feeling better today, cough decreased, eupneic at rest    HPI:  Patient is a 81 year old female with a PMHX of asthma, COPD, Afib (on pradaxa) and hypothyroidism who presents with a three week history of cough. Cough is productive of yellow/clear sputum and symptoms are associated with decreased appetite but denies CP, palpitations, fever,  headache or recent travel. She notes her  has been suffering from a cold lately. Patient self prescribed doxycycline, which has worekd in the past, however symptoms did not improve. She saw Dr. Tate recently, who gave her a steroid taper on 1/19 but she has not completed it yet. Patient notes desaturating when walking down the hallway during that visit. Of note, she does not get vaccines, including pneumovax and the flu shot.     In the ED, VS were TMax 98.2 HR 77 /85 RR 20 O2 97 on 2L NC. She was given levaquin and 40 of solumedrol. (27 Jan 2017 00:29)    REVIEW OF SYSTEMS:updated  Constitutional: No fever, weight loss, chills or fatigue  Eyes: No eye pain, visual disturbances, or discharge  ENMT:  No difficulty hearing, tinnitus, vertigo; No sinus or throat pain. No epistaxis, dysphagia, dysphonia, hoarseness or odynophagia  Neck: No pain, stiffness or neck swelling.  No masses or deformities  Respiratory: No cough, wheezing, chills or hemoptysis  - COPD  - ILD   - PE   - ASTHMA     - PNEUMONIA  Cardiovascular: No chest pain, dysrhythmia, palpitations, dizziness or edema   - COPD     - CAD   - CHF   - HTN  Gastrointestinal: No abdominal or epigastric pain. No nausea, vomiting or hematemesis; No diarrhea or constipation. No melena or hematochezia. No dysphagia or Icterus.          Genitourinary: No dysuria, frequency, hematuria or incontinence   - CKD/EVIE      - ESRD  Neurological: No headaches, memory loss, loss of strength, numbness or tremors      -DEMENTIA     - STROKE    - SEIZURE  Skin: No itching, burning, rashes or lesions   Lymph Nodes: No enlarged glands  Endocrine: No heat or cold intolerance; No hair loss       - DM     - THYROID DISORDER  Musculoskeletal: No joint pain or swelling; No muscle, back or extremity pain  Psychiatric: No depression, anxiety, mood swings or difficulty sleeping  Heme/Lymph: No easy bruising or bleeding gums         - ANEMIA      - CANCER   -COAGULOPATHY  Allergy and Immunologic: No hives or eczema    PAST MEDICAL & SURGICAL HISTORY:  Atrial fibrillation  Asthma  Bronchitis  No significant past surgical history    FAMILY HISTORY:    SOCIAL HISTORY:      - Tobacco     - ETOH    Allergies    Mustard (Anaphylaxis)  No Known Drug Allergies    Intolerances      Vital Signs Last 24 Hrs  T(C): 36.7, Max: 36.9 (01-28 @ 18:37)  T(F): 98.1, Max: 98.5 (01-28 @ 21:00)  HR: 73 (73 - 87)  BP: 162/90 (144/81 - 163/87)  BP(mean): --  RR: 16 (16 - 16)  SpO2: 99% (93% - 99%)      PHYSICAL EXAM:  more Comfortable, no distress  Eyes: PERRL, EOM intact; conjunctiva and sclera clear  Head: Normocephalic;  No Trauma  ENMT: No nasal discharge,+ hoarseness,+ cough no hemoptysis.  Neck: Supple; non tender; no masses or deformities.    No JVD  Respiratory:   - WHEEZING    RHONCHI  - RALES  + CRACKLES.  Diminished breath sounds  BILATERAL  RIGHT  LEFT bases  Cardiovascular: Regular rate and rhythm. S1 and S2 Normal; No murmurs, gallops or rubs     - PPM/AICD  Gastrointestinal: Soft non-tender, non-distended; Normal bowel sounds; No hepatosplenomegaly.     -PEG    -  GT   - GARCIA  Genitourinary: No costovertebral angle tenderness. No dysuria  Extremities: AROM, No clubbing, cyanosis or edema    Vascular: Peripheral pulses palpable 2+ bilaterally  Neurological: Alert and responisve to stimuli   Skin: Warm and dry. No obvious rash  Lymph Nodes: No acute cervical or supraclavicular adenopathy  Psychiatric: Cooperative and appropriate mood    DEVICES:  - DENTURES   +IV R / L     - ETUBE   -TRACH   -CTUBE  R / L      LABS:                          12.3   16.4  )-----------( 275      ( 29 Jan 2017 08:20 )             37.2     29 Jan 2017 08:20    140    |  103    |  21     ----------------------------<  119    4.2     |  28     |  0.87     Ca    8.8        29 Jan 2017 08:20  Phos  3.3       28 Jan 2017 06:43  Mg     2.4       29 Jan 2017 08:20        EXAM:  CT ANGIO CHEST PE PROTOCOL IC                           PROCEDURE DATE:  01/26/2017    Quantity of Contrast in Vial in ml: 125 Contrast Used: Optiray 350  Quantity of Contrast Wasted in ml: 5       INTERPRETATION:  Resident preliminary report  CTA (CT angiography) of the CHEST dated 1/26/2017 8:52 PM    INDICATION: 81-year-old female with dyspnea on exertion. Assess for   pulmonary embolism.    TECHNIQUE: CT angiography of the chest was performed during bolus   injection of intravenous contrast.  Post-processing including the   production of axial, coronal and sagittal multiplanar reformatted images   and axial and coronal maximum intensity projections (MIPs) was performed.    PRIOR STUDY: None.    FINDINGS: No pulmonary embolism is seen. The pulmonary artery is dilated   measuring 4.0 cm which can be seen in the setting of pulmonary arterial   hypertension. The thoracic aorta is normal in size. There is mild to   moderate scattered calcified plaque. There is a bovine configuration to   the aortic arch. The heart is normal in size. No pericardial effusion is   seen. No mediastinal, hilar or axillary lymphadenopathy is seen.    No pleural effusions are seen. Moderate to severe centrilobular and   paraseptal emphysematous changes are noted. Atelectasis is seen in the   lingula and minimally in the lower lobes. There is a 0.4 cm pulmonary   nodule (image 113, series 4) in the right upper lobe. There are a few 2   mm granulomas in the left lower lobe.    Limited evaluation ofthe upper abdomen demonstrates multiple low   attenuating lesions are seen within the liver, most of which are too   small to characterize.  There is a cyst in the fourth segment measuring 1.3 cm. There is a 0.7 cm   low attenuating lesion in the right kidney which is too small to   characterize. The upper abdomen is otherwise unremarkable.    Evaluation of the osseous structures demonstrates degenerative changes   and osteopenia. There is a possible remote fracture of the xiphoid   process.      IMPRESSION:   1.  No pulmonary embolism.  2.  Dilatation of the pulmonary artery may indicate pulmonary artery   hypertension.  3.  Moderate to severe emphysematous changes.  4.  0.4 cm pulmonary nodule. Per Fleischner Society recommendations in a   low-risk individual no follow-up is recommended. In a high-risk   individual repeat chest CT in 12 months is appropriate.     I, Jose Alfredo Ernandez MD, have reviewed the images and the report and agree   with the findings, with the following modification: Few additional   subcentimeter pulmonary nodules are seen in the left lower lobe including   a 6 mm nodule on series 4, image 231.  RADIOLOGY & ADDITIONAL STUDIES (The following images were personally reviewed):

## 2017-01-30 LAB
ANION GAP SERPL CALC-SCNC: 8 MMOL/L — LOW (ref 9–16)
BASOPHILS NFR BLD AUTO: 0.1 % — SIGNIFICANT CHANGE UP (ref 0–2)
BUN SERPL-MCNC: 20 MG/DL — SIGNIFICANT CHANGE UP (ref 7–23)
CALCIUM SERPL-MCNC: 8.8 MG/DL — SIGNIFICANT CHANGE UP (ref 8.5–10.5)
CHLORIDE SERPL-SCNC: 102 MMOL/L — SIGNIFICANT CHANGE UP (ref 96–108)
CO2 SERPL-SCNC: 31 MMOL/L — SIGNIFICANT CHANGE UP (ref 22–31)
CREAT SERPL-MCNC: 0.8 MG/DL — SIGNIFICANT CHANGE UP (ref 0.5–1.3)
EOSINOPHIL NFR BLD AUTO: 0 % — SIGNIFICANT CHANGE UP (ref 0–6)
GLUCOSE SERPL-MCNC: 121 MG/DL — HIGH (ref 70–99)
HCT VFR BLD CALC: 38.3 % — SIGNIFICANT CHANGE UP (ref 34.5–45)
HGB BLD-MCNC: 12.4 G/DL — SIGNIFICANT CHANGE UP (ref 11.5–15.5)
LYMPHOCYTES # BLD AUTO: 3.1 % — LOW (ref 13–44)
MAGNESIUM SERPL-MCNC: 2.4 MG/DL — SIGNIFICANT CHANGE UP (ref 1.6–2.4)
MCHC RBC-ENTMCNC: 29.7 PG — SIGNIFICANT CHANGE UP (ref 27–34)
MCHC RBC-ENTMCNC: 32.4 G/DL — SIGNIFICANT CHANGE UP (ref 32–36)
MCV RBC AUTO: 91.6 FL — SIGNIFICANT CHANGE UP (ref 80–100)
MONOCYTES NFR BLD AUTO: 2.7 % — SIGNIFICANT CHANGE UP (ref 2–14)
NEUTROPHILS NFR BLD AUTO: 94.1 % — HIGH (ref 43–77)
PLATELET # BLD AUTO: 270 K/UL — SIGNIFICANT CHANGE UP (ref 150–400)
POTASSIUM SERPL-MCNC: 4.6 MMOL/L — SIGNIFICANT CHANGE UP (ref 3.5–5.3)
POTASSIUM SERPL-SCNC: 4.6 MMOL/L — SIGNIFICANT CHANGE UP (ref 3.5–5.3)
RBC # BLD: 4.18 M/UL — SIGNIFICANT CHANGE UP (ref 3.8–5.2)
RBC # FLD: 16.1 % — SIGNIFICANT CHANGE UP (ref 10.3–16.9)
SODIUM SERPL-SCNC: 141 MMOL/L — SIGNIFICANT CHANGE UP (ref 135–145)
WBC # BLD: 15 K/UL — HIGH (ref 3.8–10.5)
WBC # FLD AUTO: 15 K/UL — HIGH (ref 3.8–10.5)

## 2017-01-30 PROCEDURE — 99222 1ST HOSP IP/OBS MODERATE 55: CPT

## 2017-01-30 PROCEDURE — 93010 ELECTROCARDIOGRAM REPORT: CPT

## 2017-01-30 RX ORDER — LISINOPRIL 2.5 MG/1
2.5 TABLET ORAL ONCE
Qty: 0 | Refills: 0 | Status: COMPLETED | OUTPATIENT
Start: 2017-01-30 | End: 2017-01-30

## 2017-01-30 RX ORDER — LISINOPRIL 2.5 MG/1
5 TABLET ORAL DAILY
Qty: 0 | Refills: 0 | Status: DISCONTINUED | OUTPATIENT
Start: 2017-01-31 | End: 2017-01-31

## 2017-01-30 RX ADMIN — LISINOPRIL 2.5 MILLIGRAM(S): 2.5 TABLET ORAL at 22:26

## 2017-01-30 RX ADMIN — Medication 0.25 MILLIGRAM(S): at 06:53

## 2017-01-30 RX ADMIN — Medication 3 MILLILITER(S): at 06:55

## 2017-01-30 RX ADMIN — Medication 3 MILLILITER(S): at 18:32

## 2017-01-30 RX ADMIN — Medication 75 MICROGRAM(S): at 06:53

## 2017-01-30 RX ADMIN — DABIGATRAN ETEXILATE MESYLATE 150 MILLIGRAM(S): 150 CAPSULE ORAL at 09:29

## 2017-01-30 RX ADMIN — Medication 40 MILLIGRAM(S): at 11:28

## 2017-01-30 RX ADMIN — Medication 40 MILLIGRAM(S): at 14:15

## 2017-01-30 RX ADMIN — Medication 3 MILLILITER(S): at 14:15

## 2017-01-30 RX ADMIN — PANTOPRAZOLE SODIUM 40 MILLIGRAM(S): 20 TABLET, DELAYED RELEASE ORAL at 06:54

## 2017-01-30 RX ADMIN — LISINOPRIL 2.5 MILLIGRAM(S): 2.5 TABLET ORAL at 06:54

## 2017-01-30 RX ADMIN — AMIODARONE HYDROCHLORIDE 200 MILLIGRAM(S): 400 TABLET ORAL at 06:52

## 2017-01-30 RX ADMIN — LISINOPRIL 2.5 MILLIGRAM(S): 2.5 TABLET ORAL at 09:29

## 2017-01-30 RX ADMIN — Medication 3 MILLILITER(S): at 22:25

## 2017-01-30 RX ADMIN — DABIGATRAN ETEXILATE MESYLATE 150 MILLIGRAM(S): 150 CAPSULE ORAL at 22:26

## 2017-01-30 RX ADMIN — MONTELUKAST 10 MILLIGRAM(S): 4 TABLET, CHEWABLE ORAL at 11:28

## 2017-01-30 RX ADMIN — Medication 3 MILLILITER(S): at 09:30

## 2017-01-30 NOTE — PROGRESS NOTE ADULT - PROBLEM SELECTOR PLAN 1
She has a PA diameter of 4cm on the CTA.   - Her ECHO shows a PASP of 34mm Hg, which is not significantly elevated.   - She doesn't have risk factors in her hx to suggest class 1, 2, 4 or 5.  - This mild elevation could be a result of her acute exacerbation of COPD.  - an ECHO could be repeated as an outpatient when she is back to her baseline and not in an exacerbation of COPD.   - Have offered her sleep study testing but she is not interested.  - Please check Ddimer and bedside-spirometry. She will need to be ambulated to assess if she needs home oxygen

## 2017-01-30 NOTE — PROGRESS NOTE ADULT - SUBJECTIVE AND OBJECTIVE BOX
Interval Events:  Patient seen and examined at bedside.        MEDICATIONS:  Pulmonary:  montelukast 10milliGRAM(s) Oral daily  ALBUTerol/ipratropium for Nebulization 3milliLiter(s) Nebulizer every 4 hours    Antimicrobials:  levoFLOXacin IVPB 500milliGRAM(s) IV Intermittent every 24 hours    Anticoagulants:  dabigatran 150milliGRAM(s) Oral every 12 hours    Cardiac:  amiodarone    Tablet 200milliGRAM(s) Oral daily  digoxin     Tablet 0.25milliGRAM(s) Oral daily      Allergies    Mustard (Anaphylaxis)  No Known Drug Allergies    Intolerances        Vital Signs Last 24 Hrs  T(C): 36.7, Max: 36.7 (01-30 @ 11:23)  T(F): 98.1, Max: 98.1 (01-30 @ 11:23)  HR: 74 (66 - 81)  BP: 173/82 (163/82 - 189/93)  BP(mean): --  RR: 16 (15 - 16)  SpO2: 93% (92% - 98%)          LABS:      CBC Full  -  ( 30 Jan 2017 07:03 )  WBC Count : 15.0 K/uL  Hemoglobin : 12.4 g/dL  Hematocrit : 38.3 %  Platelet Count - Automated : 270 K/uL  Mean Cell Volume : 91.6 fL  Mean Cell Hemoglobin : 29.7 pg  Mean Cell Hemoglobin Concentration : 32.4 g/dL  Auto Neutrophil # : x  Auto Lymphocyte # : x  Auto Monocyte # : x  Auto Eosinophil # : x  Auto Basophil # : x  Auto Neutrophil % : 94.1 %  Auto Lymphocyte % : 3.1 %  Auto Monocyte % : 2.7 %  Auto Eosinophil % : 0.0 %  Auto Basophil % : 0.1 %    30 Jan 2017 07:03    141    |  102    |  20     ----------------------------<  121    4.6     |  31     |  0.80     Ca    8.8        30 Jan 2017 07:03  Mg     2.4       30 Jan 2017 07:03                      RADIOLOGY & ADDITIONAL STUDIES (The following images were personally reviewed):

## 2017-01-30 NOTE — PROGRESS NOTE ADULT - PROBLEM SELECTOR PROBLEM 6
Nutrition, metabolism, and development symptoms
Nutrition, metabolism, and development symptoms
Prophylactic measure

## 2017-01-30 NOTE — PROGRESS NOTE ADULT - PROBLEM SELECTOR PLAN 2
Pt with known asthma, only on advair and singulair at home.  Sees Dr. Tate.   -c/w Solumedrol 40 mg IV q8h  -c/w Monetlukast 10mg, PO, qd  -c/w Duoneb q6h Pt with known asthma, only on advair and singulair at home.  Sees Dr. Tate.   -c/w Solumedrol 40 mg IV q8h, switch to prednisone 40mg daily today.   -c/w Monetlukast 10mg, PO, qd  -c/w Duoneb q6h

## 2017-01-30 NOTE — PROGRESS NOTE ADULT - PROBLEM SELECTOR PLAN 1
Pt with increased dyspnea and sputum production upon presentation. COPD exacerbation 2/2 to Rhino virus (pos RapRVP).  Pt afebrile (97.4F), WBC : 8, Blood cx showed no growth to date. Lungs CTAB.  Pt on suppl O2, 2L NC with adequate saturation. CT chest remarkable for moderate to severe emphysematous changes  - Continue Levaquin 500ml IV q24h (1/27-).  - Duonebs q6 hrs PRN   - c/w Solumedrol 40 mg IV 8h  - consider bedside spirometry for COPD management optimization.  - Echo EF 60-65%  - Dr. Rawls consulted for pulm HTN eval Pt with increased dyspnea and sputum production upon presentation, found to have COPD exacerbation 2/2 to Rhino virus (pos RapRVP). Lungs clear with decreased BS bilaterally.  Pt on suppl O2, 2L NC, desaturating on RA into 70-80's when ambulating. CT chest remarkable for moderate to severe emphysematous changes. Pt will likely need home oxygen upon discharge for worsening lung disease.   - Continue Levaquin 500ml IV q24h (1/27-).  - Duonebs q6 hrs PRN   - Switch Solumedrol 40 mg IV 8h to prednisone 40mg daily.   - consider bedside spirometry for COPD management optimization.  - Echo EF 60-65%, borderline pulm HTN (34mmhg)  - Dr. Rawls consulted for pulm HTN eval

## 2017-01-30 NOTE — PROGRESS NOTE ADULT - SUBJECTIVE AND OBJECTIVE BOX
Interventional, Pulmonary, Critical, Chest Special Procedures.    Pt was seen and fully examined by myself.     Time spent with patient in minutes:67    Patient is a 81y old  Female who presents with a chief complaint of SOB (27 Jan 2017 19:51)The patient reportas feeling better today. The patient was seen by Chief of Service and discussed. Consult greatly appreciated.    HPI:  Patient is a 81 year old female with a PMHX of asthma, COPD, Afib (on pradaxa) and hypothyroidism who presents with a three week history of cough. Cough is productive of yellow/clear sputum and symptoms are associated with decreased appetite but denies CP, palpitations, fever,  headache or recent travel. She notes her  has been suffering from a cold lately. Patient self prescribed doxycycline, which has worekd in the past, however symptoms did not improve. She saw Dr. Tate recently, who gave her a steroid taper on 1/19 but she has not completed it yet. Patient notes desaturating when walking down the hallway during that visit. Of note, she does not get vaccines, including pneumovax and the flu shot.     In the ED, VS were TMax 98.2 HR 77 /85 RR 20 O2 97 on 2L NC. She was given levaquin and 40 of solumedrol. (27 Jan 2017 00:29)    REVIEW OF SYSTEMS:  Constitutional: No fever, weight loss, chills or fatigue  Eyes: No eye pain, visual disturbances, or discharge  ENMT:  No difficulty hearing, tinnitus, vertigo; No sinus or throat pain. No epistaxis, dysphagia, dysphonia, hoarseness or odynophagia  Neck: No pain, stiffness or neck swelling.  No masses or deformities  Respiratory: No cough, wheezing, chills or hemoptysis  - COPD  - ILD   - PE   - ASTHMA     - PNEUMONIA  Cardiovascular: No chest pain, dysrhythmia, palpitations, dizziness or edema   - COPD     - CAD   - CHF   - HTN  Gastrointestinal: No abdominal or epigastric pain. No nausea, vomiting or hematemesis; No diarrhea or constipation. No melena or hematochezia. No dysphagia or Icterus.          Genitourinary: No dysuria, frequency, hematuria or incontinence   - CKD/EVIE      - ESRD  Neurological: No headaches, memory loss, loss of strength, numbness or tremors      -DEMENTIA     - STROKE    - SEIZURE  Skin: No itching, burning, rashes or lesions   Lymph Nodes: No enlarged glands  Endocrine: No heat or cold intolerance; No hair loss       - DM     - THYROID DISORDER  Musculoskeletal: No joint pain or swelling; No muscle, back or extremity pain  Psychiatric: No depression, anxiety, mood swings or difficulty sleeping  Heme/Lymph: No easy bruising or bleeding gums         - ANEMIA      - CANCER   -COAGULOPATHY  Allergy and Immunologic: No hives or eczema    PAST MEDICAL & SURGICAL HISTORY:  Atrial fibrillation  Asthma  Bronchitis  No significant past surgical history    FAMILY HISTORY:    SOCIAL HISTORY:      - Tobacco     - ETOH    Allergies    Mustard (Anaphylaxis)  No Known Drug Allergies    Intolerances      Vital Signs Last 24 Hrs  T(C): 36.7, Max: 36.7 (01-30 @ 11:23)  T(F): 98.1, Max: 98.1 (01-30 @ 11:23)  HR: 74 (66 - 81)  BP: 173/82 (163/82 - 189/93)  BP(mean): --  RR: 16 (15 - 16)  SpO2: 93% (92% - 98%)      PHYSICAL EXAM:  more Comfortable, no distress  Eyes: PERRL, EOM intact; conjunctiva and sclera clear  Head: Normocephalic;  No Trauma  ENMT: No nasal discharge,+ hoarseness,+ cough no hemoptysis.  Neck: Supple; non tender; no masses or deformities.    No JVD  Respiratory:   - WHEEZING    RHONCHI  - RALES  + CRACKLES.  Diminished breath sounds  BILATERAL  RIGHT  LEFT bases  Cardiovascular: Regular rate and rhythm. S1 and S2 Normal; No murmurs, gallops or rubs     - PPM/AICD  Gastrointestinal: Soft non-tender, non-distended; Normal bowel sounds; No hepatosplenomegaly.     -PEG    -  GT   - GARCIA  Genitourinary: No costovertebral angle tenderness. No dysuria  Extremities: AROM, No clubbing, cyanosis or edema    Vascular: Peripheral pulses palpable 2+ bilaterally  Neurological: Alert and responisve to stimuli   Skin: Warm and dry. No obvious rash  Lymph Nodes: No acute cervical or supraclavicular adenopathy  Psychiatric: Cooperative and appropriate mood    DEVICES:  - DENTURES   +IV R / L     - ETUBE   -TRACH   -CTUBE  R / L      LABS:                          12.4   15.0  )-----------( 270      ( 30 Jan 2017 07:03 )             38.3     30 Jan 2017 07:03    141    |  102    |  20     ----------------------------<  121    4.6     |  31     |  0.80     Ca    8.8        30 Jan 2017 07:03  Mg     2.4       30 Jan 2017 07:03        RADIOLOGY & ADDITIONAL STUDIES (The following images were personally reviewed):

## 2017-01-30 NOTE — PROGRESS NOTE ADULT - SUBJECTIVE AND OBJECTIVE BOX
O/N Events: MAINOR  Subjective: Pt still complaining of cough, dry, nonproductive.     PMH:   PAST MEDICAL & SURGICAL HISTORY:  Atrial fibrillation  Asthma  Bronchitis  No significant past surgical history      VITALS  Vital Signs Last 24 Hrs  T(C): 36.7, Max: 36.7 (01-30 @ 11:23)  T(F): 98.1, Max: 98.1 (01-30 @ 11:23)  HR: 74 (66 - 81)  BP: 173/82 (163/82 - 189/93)  BP(mean): --  RR: 16 (15 - 16)  SpO2: 93% (92% - 98%)    I&O's Summary      CAPILLARY BLOOD GLUCOSE  87 (30 Jan 2017 11:31)  111 (30 Jan 2017 07:39)  119 (29 Jan 2017 21:00)  138 (29 Jan 2017 16:51)      PHYSICAL EXAM  General: A&Ox 3; NAD  Head: NC/AT;   Eyes: PERRL; EOMI; anicteric sclera  Neck: Supple; no JVD  Respiratory: CTA B/L; no wheezes/crackles/rales auscultated w/ good air movement  Cardiovascular: Regular rhythm/rate; S1/S2; no gallops or murmurs auscultated  Gastrointestinal: Soft; NTND w/out rebound tenderness or guarding; bowel sounds normal  Extremities: WWP; no edema or cyanosis; radial/pedal pulses palpable  Neurological:  CNII-XII grossly intact; no obvious focal deficits    MEDICATIONS  (STANDING):  amiodarone    Tablet 200milliGRAM(s) Oral daily  digoxin     Tablet 0.25milliGRAM(s) Oral daily  montelukast 10milliGRAM(s) Oral daily  levothyroxine 75MICROGram(s) Oral daily  pantoprazole    Tablet 40milliGRAM(s) Oral before breakfast  levoFLOXacin IVPB 500milliGRAM(s) IV Intermittent every 24 hours  insulin lispro (HumaLOG) corrective regimen sliding scale  SubCutaneous Before meals and at bedtime  dabigatran 150milliGRAM(s) Oral every 12 hours  ALBUTerol/ipratropium for Nebulization 3milliLiter(s) Nebulizer every 4 hours  mineral oil 30milliLiter(s) Oral at bedtime  predniSONE   Tablet 40milliGRAM(s) Oral daily    MEDICATIONS  (PRN):      LABS                        12.4   15.0  )-----------( 270      ( 30 Jan 2017 07:03 )             38.3     30 Jan 2017 07:03    141    |  102    |  20     ----------------------------<  121    4.6     |  31     |  0.80     Ca    8.8        30 Jan 2017 07:03  Mg     2.4       30 Jan 2017 07:03 O/N Events: MAINOR  Subjective: Pt still complaining of cough, dry, nonproductive as well as SOB when ambulating.     PMH:   PAST MEDICAL & SURGICAL HISTORY:  Atrial fibrillation  Asthma  Bronchitis  No significant past surgical history      VITALS  Vital Signs Last 24 Hrs  T(C): 36.7, Max: 36.7 ( @ 11:23)  T(F): 98.1, Max: 98.1 ( @ 11:23)  HR: 74 (66 - 81)  BP: 173/82 (163/82 - 189/93)  BP(mean): --  RR: 16 (15 - 16)  SpO2: 93% (92% - 98%). SITTIN RA. WALKING: Desaturated to 76-91 with exertion. O2 improved with rest to low 90's. Patient felt SOB    I&O's Summary      CAPILLARY BLOOD GLUCOSE  87 (2017 11:31)  111 (2017 07:39)  119 (2017 21:00)  138 (2017 16:51)      PHYSICAL EXAM  General: A&Ox 3; NAD  Head: NC/AT;   Eyes: PERRL; EOMI; anicteric sclera  Neck: Supple; no JVD  Respiratory: CTA B/L; no wheezes/crackles/rales auscultated w/ good air movement  Cardiovascular: Regular rhythm/rate; S1/S2; no gallops or murmurs auscultated  Gastrointestinal: Soft; NTND w/out rebound tenderness or guarding; bowel sounds normal  Extremities: WWP; no edema or cyanosis; radial/pedal pulses palpable  Neurological:  CNII-XII grossly intact; no obvious focal deficits    MEDICATIONS  (STANDING):  amiodarone    Tablet 200milliGRAM(s) Oral daily  digoxin     Tablet 0.25milliGRAM(s) Oral daily  montelukast 10milliGRAM(s) Oral daily  levothyroxine 75MICROGram(s) Oral daily  pantoprazole    Tablet 40milliGRAM(s) Oral before breakfast  levoFLOXacin IVPB 500milliGRAM(s) IV Intermittent every 24 hours  insulin lispro (HumaLOG) corrective regimen sliding scale  SubCutaneous Before meals and at bedtime  dabigatran 150milliGRAM(s) Oral every 12 hours  ALBUTerol/ipratropium for Nebulization 3milliLiter(s) Nebulizer every 4 hours  mineral oil 30milliLiter(s) Oral at bedtime  predniSONE   Tablet 40milliGRAM(s) Oral daily    MEDICATIONS  (PRN):      LABS                        12.4   15.0  )-----------( 270      ( 2017 07:03 )             38.3     2017 07:03    141    |  102    |  20     ----------------------------<  121    4.6     |  31     |  0.80     Ca    8.8        2017 07:03  Mg     2.4       2017 07:03 O/N Events: MAINOR  Subjective: Pt still complaining of cough, dry, nonproductive as well as SOB when ambulating.     PMH:   PAST MEDICAL & SURGICAL HISTORY:  Atrial fibrillation  Asthma  Bronchitis  No significant past surgical history      VITALS  Vital Signs Last 24 Hrs  T(C): 36.7, Max: 36.7 ( @ 11:23)  T(F): 98.1, Max: 98.1 ( @ 11:23)  HR: 74 (66 - 81)  BP: 173/82 (163/82 - 189/93)  BP(mean): --  RR: 16 (15 - 16)  SpO2: 93% (92% - 98%). SITTIN RA. WALKING: Desaturated to 76-91 with exertion. O2 improved with rest to low 90's. Patient felt SOB    I&O's Summary      CAPILLARY BLOOD GLUCOSE  87 (2017 11:31)  111 (2017 07:39)  119 (2017 21:00)  138 (2017 16:51)      PHYSICAL EXAM  General: A&Ox 3; NAD  Head: NC/AT;   Eyes: PERRL; EOMI; anicteric sclera  Neck: Supple; no JVD  Respiratory: Decreased breath sounds b/l CTA B/L; no wheezes/crackles/rales auscultated w/ good air movement  Cardiovascular: Regular rhythm/rate; S1/S2; no gallops or murmurs auscultated  Gastrointestinal: Soft; NTND w/out rebound tenderness or guarding; bowel sounds normal  Extremities: WWP; no edema or cyanosis; radial/pedal pulses palpable  Neurological:  CNII-XII grossly intact; no obvious focal deficits    MEDICATIONS  (STANDING):  amiodarone    Tablet 200milliGRAM(s) Oral daily  digoxin     Tablet 0.25milliGRAM(s) Oral daily  montelukast 10milliGRAM(s) Oral daily  levothyroxine 75MICROGram(s) Oral daily  pantoprazole    Tablet 40milliGRAM(s) Oral before breakfast  levoFLOXacin IVPB 500milliGRAM(s) IV Intermittent every 24 hours  insulin lispro (HumaLOG) corrective regimen sliding scale  SubCutaneous Before meals and at bedtime  dabigatran 150milliGRAM(s) Oral every 12 hours  ALBUTerol/ipratropium for Nebulization 3milliLiter(s) Nebulizer every 4 hours  mineral oil 30milliLiter(s) Oral at bedtime  predniSONE   Tablet 40milliGRAM(s) Oral daily    MEDICATIONS  (PRN):      LABS                        12.4   15.0  )-----------( 270      ( 2017 07:03 )             38.3     2017 07:03    141    |  102    |  20     ----------------------------<  121    4.6     |  31     |  0.80     Ca    8.8        2017 07:03  Mg     2.4       2017 07:03

## 2017-01-31 VITALS — WEIGHT: 143.96 LBS

## 2017-01-31 LAB
ANION GAP SERPL CALC-SCNC: 6 MMOL/L — LOW (ref 9–16)
BUN SERPL-MCNC: 21 MG/DL — SIGNIFICANT CHANGE UP (ref 7–23)
CALCIUM SERPL-MCNC: 8.4 MG/DL — LOW (ref 8.5–10.5)
CHLORIDE SERPL-SCNC: 102 MMOL/L — SIGNIFICANT CHANGE UP (ref 96–108)
CO2 SERPL-SCNC: 32 MMOL/L — HIGH (ref 22–31)
CREAT SERPL-MCNC: 0.74 MG/DL — SIGNIFICANT CHANGE UP (ref 0.5–1.3)
CULTURE RESULTS: SIGNIFICANT CHANGE UP
CULTURE RESULTS: SIGNIFICANT CHANGE UP
D DIMER BLD IA.RAPID-MCNC: <150 NG/ML DDU — SIGNIFICANT CHANGE UP
GLUCOSE SERPL-MCNC: 105 MG/DL — HIGH (ref 70–99)
HCT VFR BLD CALC: 36.6 % — SIGNIFICANT CHANGE UP (ref 34.5–45)
HGB BLD-MCNC: 12 G/DL — SIGNIFICANT CHANGE UP (ref 11.5–15.5)
MAGNESIUM SERPL-MCNC: 2.5 MG/DL — HIGH (ref 1.6–2.4)
MCHC RBC-ENTMCNC: 29.8 PG — SIGNIFICANT CHANGE UP (ref 27–34)
MCHC RBC-ENTMCNC: 32.8 G/DL — SIGNIFICANT CHANGE UP (ref 32–36)
MCV RBC AUTO: 90.8 FL — SIGNIFICANT CHANGE UP (ref 80–100)
PLATELET # BLD AUTO: 258 K/UL — SIGNIFICANT CHANGE UP (ref 150–400)
POTASSIUM SERPL-MCNC: 4.2 MMOL/L — SIGNIFICANT CHANGE UP (ref 3.5–5.3)
POTASSIUM SERPL-SCNC: 4.2 MMOL/L — SIGNIFICANT CHANGE UP (ref 3.5–5.3)
RBC # BLD: 4.03 M/UL — SIGNIFICANT CHANGE UP (ref 3.8–5.2)
RBC # FLD: 15.8 % — SIGNIFICANT CHANGE UP (ref 10.3–16.9)
SODIUM SERPL-SCNC: 140 MMOL/L — SIGNIFICANT CHANGE UP (ref 135–145)
SPECIMEN SOURCE: SIGNIFICANT CHANGE UP
SPECIMEN SOURCE: SIGNIFICANT CHANGE UP
WBC # BLD: 13.9 K/UL — HIGH (ref 3.8–10.5)
WBC # FLD AUTO: 13.9 K/UL — HIGH (ref 3.8–10.5)

## 2017-01-31 PROCEDURE — 87184 SC STD DISK METHOD PER PLATE: CPT

## 2017-01-31 PROCEDURE — 99285 EMERGENCY DEPT VISIT HI MDM: CPT | Mod: 25

## 2017-01-31 PROCEDURE — 71275 CT ANGIOGRAPHY CHEST: CPT

## 2017-01-31 PROCEDURE — 84100 ASSAY OF PHOSPHORUS: CPT

## 2017-01-31 PROCEDURE — 36415 COLL VENOUS BLD VENIPUNCTURE: CPT

## 2017-01-31 PROCEDURE — 83735 ASSAY OF MAGNESIUM: CPT

## 2017-01-31 PROCEDURE — 84443 ASSAY THYROID STIM HORMONE: CPT

## 2017-01-31 PROCEDURE — 93005 ELECTROCARDIOGRAM TRACING: CPT

## 2017-01-31 PROCEDURE — 82803 BLOOD GASES ANY COMBINATION: CPT

## 2017-01-31 PROCEDURE — 85379 FIBRIN DEGRADATION QUANT: CPT

## 2017-01-31 PROCEDURE — 93010 ELECTROCARDIOGRAM REPORT: CPT

## 2017-01-31 PROCEDURE — 83880 ASSAY OF NATRIURETIC PEPTIDE: CPT

## 2017-01-31 PROCEDURE — 84295 ASSAY OF SERUM SODIUM: CPT

## 2017-01-31 PROCEDURE — 85027 COMPLETE CBC AUTOMATED: CPT

## 2017-01-31 PROCEDURE — 84132 ASSAY OF SERUM POTASSIUM: CPT

## 2017-01-31 PROCEDURE — 94010 BREATHING CAPACITY TEST: CPT | Mod: 26

## 2017-01-31 PROCEDURE — 80048 BASIC METABOLIC PNL TOTAL CA: CPT

## 2017-01-31 PROCEDURE — 94150 VITAL CAPACITY TEST: CPT

## 2017-01-31 PROCEDURE — 93306 TTE W/DOPPLER COMPLETE: CPT

## 2017-01-31 PROCEDURE — 87798 DETECT AGENT NOS DNA AMP: CPT

## 2017-01-31 PROCEDURE — 82330 ASSAY OF CALCIUM: CPT

## 2017-01-31 PROCEDURE — 84484 ASSAY OF TROPONIN QUANT: CPT

## 2017-01-31 PROCEDURE — 96375 TX/PRO/DX INJ NEW DRUG ADDON: CPT | Mod: XU

## 2017-01-31 PROCEDURE — 80053 COMPREHEN METABOLIC PANEL: CPT

## 2017-01-31 PROCEDURE — 99232 SBSQ HOSP IP/OBS MODERATE 35: CPT

## 2017-01-31 PROCEDURE — 87486 CHLMYD PNEUM DNA AMP PROBE: CPT

## 2017-01-31 PROCEDURE — 83605 ASSAY OF LACTIC ACID: CPT

## 2017-01-31 PROCEDURE — 82553 CREATINE MB FRACTION: CPT

## 2017-01-31 PROCEDURE — 87070 CULTURE OTHR SPECIMN AEROBIC: CPT

## 2017-01-31 PROCEDURE — 87581 M.PNEUMON DNA AMP PROBE: CPT

## 2017-01-31 PROCEDURE — 97161 PT EVAL LOW COMPLEX 20 MIN: CPT

## 2017-01-31 PROCEDURE — 87633 RESP VIRUS 12-25 TARGETS: CPT

## 2017-01-31 PROCEDURE — 96374 THER/PROPH/DIAG INJ IV PUSH: CPT | Mod: XU

## 2017-01-31 PROCEDURE — 94729 DIFFUSING CAPACITY: CPT | Mod: 26

## 2017-01-31 PROCEDURE — 87040 BLOOD CULTURE FOR BACTERIA: CPT

## 2017-01-31 PROCEDURE — 71046 X-RAY EXAM CHEST 2 VIEWS: CPT

## 2017-01-31 PROCEDURE — 85025 COMPLETE CBC W/AUTO DIFF WBC: CPT

## 2017-01-31 PROCEDURE — 94640 AIRWAY INHALATION TREATMENT: CPT

## 2017-01-31 PROCEDURE — 82550 ASSAY OF CK (CPK): CPT

## 2017-01-31 PROCEDURE — 94726 PLETHYSMOGRAPHY LUNG VOLUMES: CPT | Mod: 26

## 2017-01-31 RX ORDER — LISINOPRIL 2.5 MG/1
10 TABLET ORAL DAILY
Qty: 0 | Refills: 0 | Status: DISCONTINUED | OUTPATIENT
Start: 2017-02-01 | End: 2017-01-31

## 2017-01-31 RX ORDER — LISINOPRIL 2.5 MG/1
2.5 TABLET ORAL ONCE
Qty: 0 | Refills: 0 | Status: COMPLETED | OUTPATIENT
Start: 2017-01-31 | End: 2017-01-31

## 2017-01-31 RX ORDER — LISINOPRIL 2.5 MG/1
1 TABLET ORAL
Qty: 30 | Refills: 0
Start: 2017-01-31 | End: 2017-03-02

## 2017-01-31 RX ADMIN — Medication 0.25 MILLIGRAM(S): at 05:34

## 2017-01-31 RX ADMIN — LISINOPRIL 5 MILLIGRAM(S): 2.5 TABLET ORAL at 05:05

## 2017-01-31 RX ADMIN — AMIODARONE HYDROCHLORIDE 200 MILLIGRAM(S): 400 TABLET ORAL at 05:34

## 2017-01-31 RX ADMIN — Medication 3 MILLILITER(S): at 09:54

## 2017-01-31 RX ADMIN — Medication 40 MILLIGRAM(S): at 05:34

## 2017-01-31 RX ADMIN — PANTOPRAZOLE SODIUM 40 MILLIGRAM(S): 20 TABLET, DELAYED RELEASE ORAL at 05:34

## 2017-01-31 RX ADMIN — Medication 75 MICROGRAM(S): at 05:34

## 2017-01-31 RX ADMIN — Medication 3 MILLILITER(S): at 13:57

## 2017-01-31 RX ADMIN — Medication 0.2 MILLIGRAM(S): at 03:16

## 2017-01-31 RX ADMIN — LISINOPRIL 2.5 MILLIGRAM(S): 2.5 TABLET ORAL at 00:50

## 2017-01-31 RX ADMIN — DABIGATRAN ETEXILATE MESYLATE 150 MILLIGRAM(S): 150 CAPSULE ORAL at 09:53

## 2017-01-31 RX ADMIN — Medication 3 MILLILITER(S): at 05:33

## 2017-01-31 RX ADMIN — MONTELUKAST 10 MILLIGRAM(S): 4 TABLET, CHEWABLE ORAL at 13:57

## 2017-01-31 NOTE — CHART NOTE - NSCHARTNOTEFT_GEN_A_CORE
Patient hypertensive overnight to 205/102. Gave lisinopril 2.5 mg po x 1. Repeat 185/100. Gave additional lisinopril 2.5 mg po x 1. Repeat at 2:30 am done manually was 190/96. Patient asymptomatic throughout night. Failed up-titration of lisinopril. Gave stat dose of clonidine 0.2 mg po x 1 and started clonidine 0.1 mg po BID (next dose at 6 pm). Due for lisinopril 5 mg po at 6AM. Repeat BP measurement pending. Will advise AM team to up-titrate ACE-I and titrate off of clonidine.

## 2017-01-31 NOTE — PROGRESS NOTE ADULT - ASSESSMENT
82 y/o woman with an acute COPD exacerbation. Her PA on the CTA was 4.0cm
ASSESSMENT/PLAN 80y/o female c COPD exacerbation, Severe tracheobronchitis , likely AECB viral triggered,  emphysema, AFIB, pulmonary HTN, lung nodules    1. O2 2LNC humidify  2. Bronchodilators:  Atrovent/ albuterol q 4 – 6 hours as needed  3. Corticosteroids:SoluMedrol IV 40 mg q 12 hrs today  4. ID/Antibiotics: IV Levaquin, sputum CX  5. Cardiac/HTN: I asked Chief of Service  to evaluate this patient c probably early form  of PAH  6. GI: Rx/ prophylaxis c PPI/H2B  7. Heme: Rx/VT prophylaxis c AC continue PRADAXA, send coagulopathy panel  8. Aspiration precautions at all times  9. Mobilize, OOB, incentive spirometry,chest PT  10.Obtain PFTS  Discussed with managing team, 
ASSESSMENT/PLAN 80y/o female c COPD exacerbation, Severe tracheobronchitis, emphysema, AFIB, suspect pulmonary HTN    1. O2 2LNC humidify  2. Bronchodilators:  Atrovent/ albuterol q 4 – 6 hours as needed  3. Corticosteroids:SoluMedrol IV 40 mg q 8hrs  4. ID/Antibiotics: IV Levaquin  5. Cardiac/HTN:obtain ECHO  6. GI: Rx/ prophylaxis c PPI/H2B  7. Heme: Rx/VT prophylaxis c AC continue PRADAXA  8. Aspiration precautions at all times  Follow on formal CT  Discussed with managing team, ER, 
ASSESSMENT/PLAN 82y/o female c COPD exacerbation, Severe tracheobronchitis , likely AECB viral triggered,  emphysema, AFIB, pulmonary HTN, lung nodules    1. O2 2LNC humidify  2. Bronchodilators:  Atrovent/ albuterol q 4 – 6 hours as needed  3. Corticosteroids:SoluMedrol IV 40 mg q 12 hrs today  4. ID/Antibiotics: IV Levaquin, sputum CX  5. Cardiac/HTN: I asked Chief of Service  to evaluate this patient c probably early form  of PAH  6. GI: Rx/ prophylaxis c PPI/H2B  7. Heme: Rx/VT prophylaxis c AC continue PRADAXA, send coagulopathy panel  8. Aspiration precautions at all times  9. Mobilize, OOB, incentive spirometry,chest PT  Discussed with managing team, 
ASSESSMENT/PLAN 82y/o female c COPD exacerbation, Severe tracheobronchitis, likely AECB viral triggered,  emphysema, AFIB, pulmonary HTN    1. O2 2LNC humidify  2. Bronchodilators:  Atrovent/ albuterol q 4 – 6 hours as needed  3. Corticosteroids:SoluMedrol IV 40 mg q 8hrs today  4. ID/Antibiotics: IV Levaquin, sputum CX  5. Cardiac/HTN: I asked Chief of Service  to evaluate this patient c probably early form  of PAH  6. GI: Rx/ prophylaxis c PPI/H2B  7. Heme: Rx/VT prophylaxis c AC continue PRADAXA  8. Aspiration precautions at all times  9. Mobilize, OOB, incentive spirometry  Discussed with managing team, 
ASSESSMENT/PLAN 82y/o female c COPD exacerbation, Severe tracheobronchitis, likely AECB viral triggered,  emphysema, AFIB, pulmonary HTN    1. O2 2LNC humidify  2. Bronchodilators:  Atrovent/ albuterol q 4 – 6 hours as needed  3. Corticosteroids:SoluMedrol IV 40 mg q 8hrs today  4. ID/Antibiotics: IV Levaquin, sputum CX  5. Cardiac/HTN: I asked Chief of Service  to evaluate this patient c probably early form  of PAH  6. GI: Rx/ prophylaxis c PPI/H2B  7. Heme: Rx/VT prophylaxis c AC continue PRADAXA  8. Aspiration precautions at all times  Follow on formal CT  Discussed with managing team, ER, 
Patient is a 81 year old female with a PMHX of asthma, COPD, Afib (on pradaxa) and hypothyroidism who presented with worsening cough and SOB, found to have a COPD exacerbation with + Rhinovirus on RVP .
Patient is a 81 year old female with a PMHX of asthma, COPD, Afib (on pradaxa) and hypothyroidism who presented with worsening cough and SOB, found to have a COPD exacerbation with + Rhinovirus on RVP .
80 y/o woman with an acute COPD exacerbation. Her PA on the CTA was 4.0cm
Patient is a 81 year old female with a PMHX of asthma, COPD, Afib (on pradaxa) and hypothyroidism who presented with worsening cough and SOB, found to have a COPD exacerbation with + Rhinovirus on RVP .

## 2017-01-31 NOTE — PROGRESS NOTE ADULT - PROBLEM SELECTOR PLAN 1
She has a PA diameter of 4cm on the CTA.   - Her ECHO shows a PASP of 34mm Hg, which is not significantly elevated.   - She doesn't have risk factors in her hx to suggest class 1, 2, 4 or 5.  - This mild elevation could be a result of her acute exacerbation of COPD.  - an ECHO could be repeated as an outpatient when she is back to her baseline and not in an exacerbation of COPD. Will also consider a stress ECHO at that time.   - Have offered her sleep study testing but she is not interested.  - Her ddimer is negative - nothing to suggest chronic clots as a reason for PH  - Bedside-spirometry.   - f/u  Dr Rawls as an outpatient.

## 2017-01-31 NOTE — PROGRESS NOTE ADULT - SUBJECTIVE AND OBJECTIVE BOX
Interval Events:  Patient seen and examined at bedside.        MEDICATIONS:  Pulmonary:  montelukast 10milliGRAM(s) Oral daily  ALBUTerol/ipratropium for Nebulization 3milliLiter(s) Nebulizer every 4 hours    Antimicrobials:  levoFLOXacin IVPB 500milliGRAM(s) IV Intermittent every 24 hours    Anticoagulants:  dabigatran 150milliGRAM(s) Oral every 12 hours    Cardiac:  amiodarone    Tablet 200milliGRAM(s) Oral daily  digoxin     Tablet 0.25milliGRAM(s) Oral daily  lisinopril 5milliGRAM(s) Oral daily  cloNIDine 0.1milliGRAM(s) Oral every 12 hours      Allergies    Mustard (Anaphylaxis)  No Known Drug Allergies    Intolerances        Vital Signs Last 24 Hrs  T(C): 36.7, Max: 36.7 (01-30 @ 11:23)  T(F): 98.1, Max: 98.1 (01-30 @ 11:23)  HR: 77 (74 - 88)  BP: 159/106 (159/106 - 205/102)  BP(mean): --  RR: 18 (16 - 20)  SpO2: 93% (93% - 98%)          LABS:      CBC Full  -  ( 31 Jan 2017 06:19 )  WBC Count : 13.9 K/uL  Hemoglobin : 12.0 g/dL  Hematocrit : 36.6 %  Platelet Count - Automated : 258 K/uL  Mean Cell Volume : 90.8 fL  Mean Cell Hemoglobin : 29.8 pg  Mean Cell Hemoglobin Concentration : 32.8 g/dL  Auto Neutrophil # : x  Auto Lymphocyte # : x  Auto Monocyte # : x  Auto Eosinophil # : x  Auto Basophil # : x  Auto Neutrophil % : x  Auto Lymphocyte % : x  Auto Monocyte % : x  Auto Eosinophil % : x  Auto Basophil % : x    31 Jan 2017 06:20    140    |  102    |  21     ----------------------------<  105    4.2     |  32     |  0.74     Ca    8.4        31 Jan 2017 06:20  Mg     2.5       31 Jan 2017 06:20                      RADIOLOGY & ADDITIONAL STUDIES (The following images were personally reviewed):

## 2017-01-31 NOTE — PROGRESS NOTE ADULT - SUBJECTIVE AND OBJECTIVE BOX
CC/ HPI Patient is a 81y old female with chronic obstructive pulmonary disease, atrial fibrillation, admitted with progressive dyspnea, hypoxemia, hypertension, resting in bed without acute respiratory complaint.      PAST MEDICAL & SURGICAL HISTORY:  Atrial fibrillation  Asthma  Bronchitis    SOCHX:   + tobacco,  -  alcohol    FMHX: FA/MO  - contributory     ROS reviewed below with positive findings marked (+) :  GEN:  fever, chills ENT: tracheostomy,   epistaxis,  sinusitis COR: CAD, CHF,  HTN, dysrhythmia PUL: COPD, ILD, asthma, pneumonia GI: PEG, dysphagia, hemorrhage, other KIRK: kidney disease, electrolyte disorder HEM:  anemia, thrombus, coagulopathy, cancer ENDO:  thyroid disease, diabetes mellitus CNS:  dementia, stroke, seizure, PSY:  depression, anxiety, other      MEDICATIONS  (STANDING):  amiodarone    Tablet 200milliGRAM(s) Oral daily  digoxin     Tablet 0.25milliGRAM(s) Oral daily  montelukast 10milliGRAM(s) Oral daily  levothyroxine 75MICROGram(s) Oral daily  pantoprazole    Tablet 40milliGRAM(s) Oral before breakfast  levoFLOXacin IVPB 500milliGRAM(s) IV Intermittent every 24 hours  insulin lispro (HumaLOG) corrective regimen sliding scale  SubCutaneous Before meals and at bedtime  dabigatran 150milliGRAM(s) Oral every 12 hours  ALBUTerol/ipratropium for Nebulization 3milliLiter(s) Nebulizer every 4 hours  mineral oil 30milliLiter(s) Oral at bedtime  lisinopril 5milliGRAM(s) Oral daily  predniSONE   Tablet 40milliGRAM(s) Oral daily  cloNIDine 0.1milliGRAM(s) Oral every 12 hours    MEDICATIONS  (PRN):      Vital Signs Last 24 Hrs  T(C): 36.6, Max: 36.7 (01-30 @ 11:23)  T(F): 97.9, Max: 98.1 (01-30 @ 11:23)  HR: 78 (74 - 88)  BP: 179/93 (173/73 - 205/102)  BP(mean): --  RR: 18 (16 - 20)  SpO2: 98% (92% - 98%)    GENERAL:         comfortable,  - distress.  HEENT:            - trauma,  - icterus,  - injection,  - nasal discharge.  NECK:              - jugular venous distention, - thyromegaly.  LYMPH:           - lymphadenopathy, - masses.  RESP:              - rales,   - rhonchi,   + wheezes.   COR:                S1S2 - murmurs,  - gallops,  - rubs.  ABD:                bowel sounds,   soft, - tender, - distended, - organomegaly.  EXT/MSC:         - cyanosis, + clubbing,  - edema.    NEURO:             alert,   responds to stimuli.                            12.0   13.9  )-----------( 258      ( 31 Jan 2017 06:19 )             36.6   CTA pulmonary nodule, atelectasis, no embolus    ASSESSMENT/PLAN    1)  Chronic obstructive pulmonary disease  2)  Atrial fibrillation  3)  Pulmonary nodule  4)  Hypoxemia      Oxygen as needed  Bronchodilators:  Atrovent/ albuterol q 4 – 6 hours as needed  Corticosteroids: Prednisone  ID/Antibiotics: Levaquin  Cardiac/HTN:  BP control, follow up with cardiology  GI: Rx/ prophylaxis c PPI/H2B  Heme: Rx/VT prophylaxis c SQH/SCD/AC  Discussed with medical team CC/ HPI Patient is a 81y old female with chronic obstructive pulmonary disease, atrial fibrillation, admitted with progressive dyspnea, hypoxemia, hypertension, resting in bed without acute respiratory complaint.      PAST MEDICAL & SURGICAL HISTORY:  Atrial fibrillation  Asthma  Bronchitis    SOCHX:   + tobacco,  -  alcohol    FMHX: FA/MO  - contributory     ROS reviewed below with positive findings marked (+) :  GEN:  fever, chills ENT: tracheostomy,   epistaxis,  sinusitis COR: CAD, CHF,  HTN, dysrhythmia PUL: COPD, ILD, asthma, pneumonia GI: PEG, dysphagia, hemorrhage, other KIRK: kidney disease, electrolyte disorder HEM:  anemia, thrombus, coagulopathy, cancer ENDO:  thyroid disease, diabetes mellitus CNS:  dementia, stroke, seizure, PSY:  depression, anxiety, other      MEDICATIONS  (STANDING):  amiodarone    Tablet 200milliGRAM(s) Oral daily  digoxin     Tablet 0.25milliGRAM(s) Oral daily  montelukast 10milliGRAM(s) Oral daily  levothyroxine 75MICROGram(s) Oral daily  pantoprazole    Tablet 40milliGRAM(s) Oral before breakfast  levoFLOXacin IVPB 500milliGRAM(s) IV Intermittent every 24 hours  insulin lispro (HumaLOG) corrective regimen sliding scale  SubCutaneous Before meals and at bedtime  dabigatran 150milliGRAM(s) Oral every 12 hours  ALBUTerol/ipratropium for Nebulization 3milliLiter(s) Nebulizer every 4 hours  mineral oil 30milliLiter(s) Oral at bedtime  lisinopril 5milliGRAM(s) Oral daily  predniSONE   Tablet 40milliGRAM(s) Oral daily  cloNIDine 0.1milliGRAM(s) Oral every 12 hours    MEDICATIONS  (PRN):      Vital Signs Last 24 Hrs  T(C): 36.6, Max: 36.7 (01-30 @ 11:23)  T(F): 97.9, Max: 98.1 (01-30 @ 11:23)  HR: 78 (74 - 88)  BP: 179/93 (173/73 - 205/102)  BP(mean): --  RR: 18 (16 - 20)  SpO2: 98% (92% - 98%)    GENERAL:         comfortable,  - distress.  HEENT:            - trauma,  - icterus,  - injection,  - nasal discharge.  NECK:              - jugular venous distention, - thyromegaly.  LYMPH:           - lymphadenopathy, - masses.  RESP:              - rales,   - rhonchi,   + wheezes.   COR:                S1S2 - murmurs,  - gallops,  - rubs.  ABD:                bowel sounds,   soft, - tender, - distended, - organomegaly.  EXT/MSC:         - cyanosis, + clubbing,  - edema.    NEURO:             alert,   responds to stimuli.                            12.0   13.9  )-----------( 258      ( 31 Jan 2017 06:19 )             36.6   CTA pulmonary nodule, atelectasis, no embolus    ASSESSMENT/PLAN    1)  Chronic obstructive pulmonary disease  2)  Atrial fibrillation  3)  Pulmonary nodule  4)  Hypoxemia      Bedside spirometry  Oxygen as needed  Bronchodilators:  Atrovent/ albuterol q 4 – 6 hours as needed  Corticosteroids: Prednisone  ID/Antibiotics: Levaquin  Cardiac/HTN:  BP control, follow up with cardiology  GI: Rx/ prophylaxis c PPI/H2B  Heme: Rx/VT prophylaxis c SQH/SCD/AC  Discussed with medical team

## 2017-01-31 NOTE — DIETITIAN INITIAL EVALUATION ADULT. - OTHER INFO
Pt admitted with SOB; COPDe.  Pt endorses good appetite at this time; consuming ~75% of meals.  Pt denies GI distress; no pain.  Pt confirms food allergy to mustard.  Skin: intact. '

## 2017-01-31 NOTE — PROGRESS NOTE ADULT - ATTENDING COMMENTS
I have examined this 81 year old lady with a history of COPD who has been experiencing dyspnea on exertion for the last almost 1 year. Although she has centrilobular emphysema, we have suggested getting a spirometry to assess the severity of emphysema. I agree with Dr. Velazquez that her RAE cannot be assumed to all be from COPD.   What is puzzling is the the pulmonary artery is grossly dilated, while the ECHO shows mildly elevated PAS  There is no evidence of  combined pulmonary fibrosis and emphysema (CPFE) on the CT scan-an entity that may be associated with pulmonary HTN  We will get a 6MWT done to see if the patient desaturates.  We will also get a d-dimer sent to look for the possibility of PE. My index of suspicion for PE is low as per the Well's criteria  If the above tests do not show evidence of PE, the rest of the work up---EST, stress ECHO or RHC with NS loading can be done a out patient
We will get a spirometry to assess degree of airways obstruction. This will allow us to assess degree of dyspnea that can be attributed to COPD.  The d-dimers were not elevated; making the likelihood of acute/chronic PE less likely.  The work up for PH can be completed as an outpatient  We would recommend getting a 6MWT done. If the patient desaturates on RA while walking, she will benefit from supplemental O2

## 2017-02-01 LAB
GRAM STN FLD: SIGNIFICANT CHANGE UP
SPECIMEN SOURCE: SIGNIFICANT CHANGE UP

## 2017-02-02 DIAGNOSIS — Z87.891 PERSONAL HISTORY OF NICOTINE DEPENDENCE: ICD-10-CM

## 2017-02-02 DIAGNOSIS — E03.9 HYPOTHYROIDISM, UNSPECIFIED: ICD-10-CM

## 2017-02-02 DIAGNOSIS — J44.1 CHRONIC OBSTRUCTIVE PULMONARY DISEASE WITH (ACUTE) EXACERBATION: ICD-10-CM

## 2017-02-02 DIAGNOSIS — I48.91 UNSPECIFIED ATRIAL FIBRILLATION: ICD-10-CM

## 2017-02-02 DIAGNOSIS — I27.2 OTHER SECONDARY PULMONARY HYPERTENSION: ICD-10-CM

## 2017-02-02 DIAGNOSIS — R09.02 HYPOXEMIA: ICD-10-CM

## 2017-02-02 DIAGNOSIS — J43.9 EMPHYSEMA, UNSPECIFIED: ICD-10-CM

## 2017-02-02 DIAGNOSIS — J45.909 UNSPECIFIED ASTHMA, UNCOMPLICATED: ICD-10-CM

## 2017-02-02 DIAGNOSIS — I10 ESSENTIAL (PRIMARY) HYPERTENSION: ICD-10-CM

## 2017-02-02 DIAGNOSIS — R06.02 SHORTNESS OF BREATH: ICD-10-CM

## 2017-02-03 LAB
-  AMPICILLIN/SULBACTAM: SIGNIFICANT CHANGE UP
-  AMPICILLIN: SIGNIFICANT CHANGE UP
-  AZITHROMYCIN: SIGNIFICANT CHANGE UP
-  CEFTRIAXONE: SIGNIFICANT CHANGE UP
-  CHLORAMPHENICOL: SIGNIFICANT CHANGE UP
-  CIPROFLOXACIN: SIGNIFICANT CHANGE UP
-  CLARITHROMYCIN: SIGNIFICANT CHANGE UP
-  LEVOFLOXACIN: SIGNIFICANT CHANGE UP
-  TRIMETHOPRIM/SULFAMETHOXAZOLE: SIGNIFICANT CHANGE UP
CULTURE RESULTS: SIGNIFICANT CHANGE UP
METHOD TYPE: SIGNIFICANT CHANGE UP
ORGANISM # SPEC MICROSCOPIC CNT: SIGNIFICANT CHANGE UP
ORGANISM # SPEC MICROSCOPIC CNT: SIGNIFICANT CHANGE UP
SPECIMEN SOURCE: SIGNIFICANT CHANGE UP

## 2017-11-09 ENCOUNTER — OUTPATIENT (OUTPATIENT)
Dept: OUTPATIENT SERVICES | Facility: HOSPITAL | Age: 82
LOS: 1 days | Discharge: HOME | End: 2017-11-09

## 2017-11-20 ENCOUNTER — OUTPATIENT (OUTPATIENT)
Dept: OUTPATIENT SERVICES | Facility: HOSPITAL | Age: 82
LOS: 1 days | Discharge: HOME | End: 2017-11-20

## 2018-07-05 ENCOUNTER — FORM ENCOUNTER (OUTPATIENT)
Age: 83
End: 2018-07-05

## 2018-07-06 ENCOUNTER — OUTPATIENT (OUTPATIENT)
Dept: OUTPATIENT SERVICES | Facility: HOSPITAL | Age: 83
LOS: 1 days | End: 2018-07-06
Payer: MEDICARE

## 2018-07-06 ENCOUNTER — APPOINTMENT (OUTPATIENT)
Dept: ORTHOPEDIC SURGERY | Facility: CLINIC | Age: 83
End: 2018-07-06
Payer: MEDICARE

## 2018-07-06 VITALS
WEIGHT: 146 LBS | DIASTOLIC BLOOD PRESSURE: 80 MMHG | SYSTOLIC BLOOD PRESSURE: 130 MMHG | BODY MASS INDEX: 22.13 KG/M2 | HEIGHT: 68 IN

## 2018-07-06 DIAGNOSIS — Z87.09 PERSONAL HISTORY OF OTHER DISEASES OF THE RESPIRATORY SYSTEM: ICD-10-CM

## 2018-07-06 PROCEDURE — 99214 OFFICE O/P EST MOD 30 MIN: CPT | Mod: 25

## 2018-07-06 PROCEDURE — 73564 X-RAY EXAM KNEE 4 OR MORE: CPT | Mod: 26,50

## 2018-07-06 PROCEDURE — 73564 X-RAY EXAM KNEE 4 OR MORE: CPT

## 2018-07-06 PROCEDURE — 20610 DRAIN/INJ JOINT/BURSA W/O US: CPT | Mod: LT

## 2018-08-13 NOTE — PATIENT PROFILE ADULT. - PURPOSEFUL PROACTIVE ROUNDING
SUBJECTIVE: Ms. Zimmerman is a 79 year old female who is here for follow up of hypertension, diabetes, hyperlipidemia, hypothyroidism, vit D def, depression    She is taking blood pressure medications regularly, denies any chest pain, shortness of breath, dyspnea of exertion.  She is taking cholesterol medications regularly, denies any muscle aches or body aches.  She has been taking synthroid regularly and remains asymptomatic at the present time.  She is taking diabetic medications regularly, denies any hypo or hyperglycemic symptoms.  She has been checking blood sugars regularly and they have been running in the range of 120 mg%  She reports that she is yaya vit D supplements  She is taking her lexapro for her depression    She is here to discuss her lab results and wants medication refill.    The following histories were reviewed and updated with the patient today:  Past Medical History:   Diagnosis Date   • Acute gangrenous cholecystitis 12/30/2007   • Alopecia, unspecified 6/3/2008   • Eczema 3/02    behind left pinna   • Essential hypertension, benign 2/01   • Examination of eyes and vision 07-    Normal diabetic eye exam   [facility] specialist    • Family history of diabetes mellitus    • FAMILY HX-breast cancer     Mother & sister   • Migraine, unspecified, without mention of intractable migraine without mention of status migrainosus 1951    Migraine   • Osteoarthrosis, unspecified whether generalized or localized, lower leg 3/02    left knee   • Other abnormal heart sounds     past hx. of heart murmur   • Other specified acquired hypothyroidism 5-02   • Perforated cholecystitis 12-    Open cholecystectomy   • Seborrheic dermatitis, unspecified 3/02    scalp   • Special screening for malignant neoplasms, colon 2/28/07   • Thyroid nodule 3/02   • Tubular adenoma 02/28/07   • Type II or unspecified type diabetes mellitus without mention of complication, not stated as uncontrolled 1/04   •  Unspecified venous (peripheral) insufficiency 3/02    left leg swelling   • Vertigo 11/17/2010     Past Surgical History:   Procedure Laterality Date   • ----------mammogram----------  3/9/2012,3/14/2012    incomplete, and will be undergoing, repeat diagnostic mammogram was benign   • Appendectomy  1958   • Colonoscopy remove lesion by snare  02/28/07    Dr. Gilman   • Dexa bone density axial skeleton  06/11/2007    normal   • Mammo screening bilateral  12-2-2014    Benign   • Removal gallbladder  12-    Cholecystectomy (gallbladder)  -  open    • Removal of tonsils,<15 y/o     • Thyroid lobectomy,unilat  5-02    Right thyroid lobectomy -- Benign thyroid   • Total abdom hysterectomy  1980    VALERY with Ovaries Intact     ALLERGIES:  Codeine; Environmental [other]; and Seasonal [other]  Current Outpatient Prescriptions   Medication   • tolterodine (DETROL LA) 4 MG 24 hr capsule   • escitalopram (LEXAPRO) 20 MG tablet   • propranolol (INDERAL LA) 60 MG 24 hr capsule   • Vitamin D, Ergocalciferol, 72614 units capsule   • simvastatin (ZOCOR) 20 MG tablet   • traZODone (DESYREL) 50 MG tablet   • levothyroxine (SYNTHROID, LEVOTHROID) 75 MCG tablet   • SOFTCLIX LANCETS Misc   • Glucose Blood (ACCU-CHEK COMPACT TEST DRUM) strip   • Blood Glucose Monitoring Suppl (ACCU-CHEK COMPACT CARE KIT) KIT   • Hydrocortisone-Aloe Vera 1 % OINT   • Elastic Bandages & Supports (TRUFORM STOCKINGS 20-30MMHG) MISC   • SLO-NIACIN 750 MG PO TBCR   • ASPIRIN 325 MG PO TABS   • CALCIUM + D 600-200 MG-IU PO TABS   • mometasone (ELOCON) 0.1 % cream     No current facility-administered medications for this visit.      Family History   Problem Relation Age of Onset   • Diabetes Father    • Heart Father    • Cancer Mother         breast   • Heart Mother         Rheumatic fever   • Cancer Other         grandmother - breast   • Stroke Brother         age 52   • Cancer Sister         age 61 breast   • Hypertension Sister    • Diabetes Sister    •  Thyroid Daughter         Hypothyroidism   • Thyroid Sister         Cancer     Social History     Social History   • Marital status:      Spouse name: N/A   • Number of children: N/A   • Years of education: N/A     Occupational History   •  [employer]     Social History Main Topics   • Smoking status: Never Smoker   • Smokeless tobacco: Never Used      Comment: 2nd hand smoke   • Alcohol use No   • Drug use: No   • Sexual activity: Not Asked     Other Topics Concern   • None     Social History Narrative   • None       REVIEW OF SYSTEMS:  GENERAL:  Patient denies fever, chills, tiredness, malaise  EYES:  Patient denies blurred vision, double vision, pain  IMMUNOLOGIC:  Patient denies hayfever, itching/watery eyes, sneezing  NEUROLOGIC:  Patient denies headache, tremors, dizzy spells/lightheadedness, numbness/weakness, tingling   ENDOCRINE:  Patient denies change in appettite, excessive thirst, cold intolerance, heat intolerance, tired/sluggishness  GASTROINTESTINAL:  Patient denies abdominal pain, nausea/vomiting, indigestion/heartburn, diarrhea, constipation  CARDIOVASCULAR:  Patient denies chest pain, SOB/dyspnea on exertion, syncope, palpitation  SKIN:  Patient denies skin rashes, boils, persistent itching  MUSCULOSKELETAL:  Patient denies joint pain, stiffness, muscle twitching  EARS, NOSE, THROAT/MOUTH:  Patient denies ear pain/discharge/hearing problems, sneezing, cough, sore throat, sinus problems  GENITOURINARY:  Patient denies urine retention, painful urination, urinary frequency, blood in urine  RESPIRATORY:  Patient denies wheezing, frequent cough, shortness of breath    OBJECTIVE:   Blood pressure 120/72, pulse 76, weight 98.9 kg.    EXAM  CONSTITUTIONAL: The patient appears comfortable, oriented x3, normal gait and in no apparent distress.  SKIN: No rashes, ulcers, or suspicious lesions are seen, the skin turgor and hydration is normal, no significant subcutaneous nodules or masses, there  are no xanthomas and no xanthelasmas. and warm & dry  HEENT: The conjunctivae/sclera are clear.  No lesions, rashes, or other abnormalities are appreciated on the eyelids.  Pupils are equal and reactive to light and are symmetrical. Lips appear normal and without lesions.  The pharynx is clear without lesions or erythema.The external auditory canals and tympanic membranes are normal bilaterally. and Oral mucosa normal and nasal mucosa normal  NECK: Supple and nontender without masses, lesions, or bruits.  The thyroid is grossly normal to palpation without masses, goiter, asymmetry, or tenderness.  The trachea is midline.  RESPIRATORY:  Lungs are clear to auscultation without rales, wheezes, or rhonchi.  Percussion is within normal limits without dullness or hyperresonance.  The respiratory effort appears normal.  CARDIAC:  Regular rate and rhythm without S3, S4, heave or thrill. No murmur  ABDOMEN: Soft and nontender with positive bowel sounds in all quadrants.  No hepatosplenomegaly, masses, rebound, guarding, rigidity, ventral hernias, or bruits are appreciated.  EXTREMITIES: Upper extremities are without clubbing, cyanosis, or edema noted., Lower extermities no pedal edema noted and Pedal pulses are normal.  NEUROLOGICAL: Grossly intact with no focal neurological deficit. Deep tendon reflexes are symmetrical bilaterally and normal.  Normal motor and sensory exam.    ASSESSMENT:   DIABETES MELLITUS  Comment:   Hemoglobin A1C (%)   Date Value   08/06/2018 6.2 (H)     Plan:  Regular exercise and proper diet.   Continue checking blood sugars as recommended.   Will closely follow.    HYPERTENSION  Comment: Stable  Plan:  Regular exercise and low salt diet.   Will follow.    HYPERLIPIDEMIA  Comment:   CHOLESTEROL (mg/dL)   Date Value   04/09/2018 162     HDL (mg/dL)   Date Value   04/09/2018 47 (L)     TRIGLYCERIDE (mg/dL)   Date Value   04/09/2018 114     CALCULATED LDL (mg/dL)   Date Value   04/09/2018 92     Plan:  Continue zocor as recommended.   Regular exercise and proper diet.   Will closely follow.    HYPOTHYROIDISM  Comment:   TSH (mcUnits/mL)   Date Value   08/06/2018 1.835     Plan: Continue Synthroid and no changes made at this time.   Will closely follow.    (F32.9) Depressive disorder, not elsewhere classified  Comment: stable  Plan: on lexapro once daily    (E55.9) Vitamin D deficiency  Comment: vit D is normal  Plan: on supplements    Patient will return to clinic in December 2018 and will get the lab work done one week prior to the office visit.                                Patient

## 2018-08-31 ENCOUNTER — APPOINTMENT (OUTPATIENT)
Dept: COLORECTAL SURGERY | Facility: CLINIC | Age: 83
End: 2018-08-31
Payer: MEDICARE

## 2018-08-31 VITALS
SYSTOLIC BLOOD PRESSURE: 149 MMHG | HEART RATE: 121 BPM | BODY MASS INDEX: 21.97 KG/M2 | WEIGHT: 140 LBS | HEIGHT: 67 IN | DIASTOLIC BLOOD PRESSURE: 83 MMHG | TEMPERATURE: 97.5 F

## 2018-08-31 DIAGNOSIS — R19.5 OTHER FECAL ABNORMALITIES: ICD-10-CM

## 2018-08-31 PROCEDURE — 99203 OFFICE O/P NEW LOW 30 MIN: CPT

## 2018-09-04 LAB
ALBUMIN SERPL ELPH-MCNC: 4.3 G/DL
ALP BLD-CCNC: 60 U/L
ALT SERPL-CCNC: 5 U/L
ANION GAP SERPL CALC-SCNC: 21 MMOL/L
AST SERPL-CCNC: 22 U/L
BILIRUB SERPL-MCNC: 1.7 MG/DL
BUN SERPL-MCNC: 18 MG/DL
CALCIUM SERPL-MCNC: 10.3 MG/DL
CEA SERPL-MCNC: 4.3 NG/ML
CHLORIDE SERPL-SCNC: 97 MMOL/L
CO2 SERPL-SCNC: 24 MMOL/L
CREAT SERPL-MCNC: 0.99 MG/DL
GLUCOSE SERPL-MCNC: 51 MG/DL
POTASSIUM SERPL-SCNC: 3.6 MMOL/L
PROT SERPL-MCNC: 7.3 G/DL
SODIUM SERPL-SCNC: 142 MMOL/L

## 2018-11-05 ENCOUNTER — RESULT REVIEW (OUTPATIENT)
Age: 83
End: 2018-11-05

## 2018-11-05 ENCOUNTER — OUTPATIENT (OUTPATIENT)
Dept: OUTPATIENT SERVICES | Facility: HOSPITAL | Age: 83
LOS: 1 days | Discharge: HOME | End: 2018-11-05
Payer: MEDICARE

## 2018-11-05 ENCOUNTER — APPOINTMENT (OUTPATIENT)
Dept: GASTROENTEROLOGY | Facility: HOSPITAL | Age: 83
End: 2018-11-05

## 2018-11-05 PROCEDURE — 45380 COLONOSCOPY AND BIOPSY: CPT

## 2018-11-05 PROCEDURE — 88305 TISSUE EXAM BY PATHOLOGIST: CPT

## 2018-11-06 LAB — SURGICAL PATHOLOGY STUDY: SIGNIFICANT CHANGE UP

## 2018-11-08 ENCOUNTER — APPOINTMENT (OUTPATIENT)
Dept: ORTHOPEDIC SURGERY | Facility: CLINIC | Age: 83
End: 2018-11-08
Payer: MEDICARE

## 2018-11-08 VITALS
DIASTOLIC BLOOD PRESSURE: 90 MMHG | HEIGHT: 67 IN | SYSTOLIC BLOOD PRESSURE: 150 MMHG | HEART RATE: 98 BPM | WEIGHT: 140 LBS | BODY MASS INDEX: 21.97 KG/M2 | OXYGEN SATURATION: 92 %

## 2018-11-08 DIAGNOSIS — Z82.5 FAMILY HISTORY OF ASTHMA AND OTHER CHRONIC LOWER RESPIRATORY DISEASES: ICD-10-CM

## 2018-11-08 DIAGNOSIS — Z87.891 PERSONAL HISTORY OF NICOTINE DEPENDENCE: ICD-10-CM

## 2018-11-08 DIAGNOSIS — Z80.3 FAMILY HISTORY OF MALIGNANT NEOPLASM OF BREAST: ICD-10-CM

## 2018-11-08 DIAGNOSIS — Z82.3 FAMILY HISTORY OF STROKE: ICD-10-CM

## 2018-11-08 DIAGNOSIS — M19.90 UNSPECIFIED OSTEOARTHRITIS, UNSPECIFIED SITE: ICD-10-CM

## 2018-11-08 PROCEDURE — 99214 OFFICE O/P EST MOD 30 MIN: CPT | Mod: 25

## 2018-11-08 PROCEDURE — 20610 DRAIN/INJ JOINT/BURSA W/O US: CPT | Mod: LT

## 2018-11-27 ENCOUNTER — APPOINTMENT (OUTPATIENT)
Dept: GASTROENTEROLOGY | Facility: CLINIC | Age: 83
End: 2018-11-27

## 2019-03-29 NOTE — ED ADULT TRIAGE NOTE - TEMPERATURE IN FAHRENHEIT (DEGREES F)
"Pt called warmline for advise. Pt said ped. Is concern she does not have enough breastmilk for baby and baby has lost weight. Ped encouraged pt to call lactation and discuss different plans. Ped suggestions: 1. pumping feed baby without direct breastfeeding. Pt would like to use syringe. (pt supplemented baby this morning with syringe and ebm. Pt attended breastfeeding class at [place] [facility]. Syringe was option taught during class.) 2. Breastfeed, pump, and supplement. And 3.give formula after breastfeeding. All plans discussed. Pt will pump for 24 hours at least 8 times daily for 20-30 minutes. Pt will call this weekend if needed and call Monday to schedule opc if needed. During discuss pt expressed that she is having difficult learning the "sensation of breastfeeding on her body."  "
97.9

## 2019-09-27 ENCOUNTER — INPATIENT (INPATIENT)
Facility: HOSPITAL | Age: 84
LOS: 5 days | Discharge: HOME HEALTH | DRG: 871 | End: 2019-10-03
Attending: INTERNAL MEDICINE | Admitting: INTERNAL MEDICINE
Payer: MEDICARE

## 2019-09-27 VITALS
SYSTOLIC BLOOD PRESSURE: 113 MMHG | RESPIRATION RATE: 22 BRPM | TEMPERATURE: 99 F | HEART RATE: 124 BPM | DIASTOLIC BLOOD PRESSURE: 68 MMHG | OXYGEN SATURATION: 98 %

## 2019-09-27 DIAGNOSIS — I48.91 UNSPECIFIED ATRIAL FIBRILLATION: ICD-10-CM

## 2019-09-27 DIAGNOSIS — Z96.641 PRESENCE OF RIGHT ARTIFICIAL HIP JOINT: Chronic | ICD-10-CM

## 2019-09-27 DIAGNOSIS — J44.9 CHRONIC OBSTRUCTIVE PULMONARY DISEASE, UNSPECIFIED: ICD-10-CM

## 2019-09-27 DIAGNOSIS — E03.9 HYPOTHYROIDISM, UNSPECIFIED: ICD-10-CM

## 2019-09-27 DIAGNOSIS — J18.1 LOBAR PNEUMONIA, UNSPECIFIED ORGANISM: ICD-10-CM

## 2019-09-27 LAB
BASE EXCESS BLDV CALC-SCNC: 0.7 MMOL/L — SIGNIFICANT CHANGE UP
CA-I SERPL-SCNC: 1.19 MMOL/L — SIGNIFICANT CHANGE UP (ref 1.12–1.3)
CK MB CFR SERPL CALC: 1.6 NG/ML — SIGNIFICANT CHANGE UP (ref 0–6.7)
CK SERPL-CCNC: 55 U/L — SIGNIFICANT CHANGE UP (ref 25–170)
FLU A RESULT: SIGNIFICANT CHANGE UP
FLU A RESULT: SIGNIFICANT CHANGE UP
FLUAV AG NPH QL: SIGNIFICANT CHANGE UP
FLUBV AG NPH QL: SIGNIFICANT CHANGE UP
GAS PNL BLDV: 136 MMOL/L — LOW (ref 138–146)
GAS PNL BLDV: SIGNIFICANT CHANGE UP
HCO3 BLDV-SCNC: 26 MMOL/L — SIGNIFICANT CHANGE UP (ref 20–27)
LACTATE SERPL-SCNC: 1.3 MMOL/L — SIGNIFICANT CHANGE UP (ref 0.5–2)
PCO2 BLDV: 42 MMHG — SIGNIFICANT CHANGE UP (ref 41–51)
PH BLDV: 7.41 — SIGNIFICANT CHANGE UP (ref 7.32–7.43)
PO2 BLDV: 64 MMHG — SIGNIFICANT CHANGE UP
POTASSIUM BLDV-SCNC: 4.1 MMOL/L — SIGNIFICANT CHANGE UP (ref 3.5–4.9)
RSV RESULT: SIGNIFICANT CHANGE UP
RSV RNA RESP QL NAA+PROBE: SIGNIFICANT CHANGE UP
SAO2 % BLDV: 91 % — SIGNIFICANT CHANGE UP
TROPONIN T SERPL-MCNC: <0.01 NG/ML — SIGNIFICANT CHANGE UP (ref 0–0.01)

## 2019-09-27 PROCEDURE — 93010 ELECTROCARDIOGRAM REPORT: CPT

## 2019-09-27 PROCEDURE — 71045 X-RAY EXAM CHEST 1 VIEW: CPT | Mod: 26

## 2019-09-27 PROCEDURE — 99291 CRITICAL CARE FIRST HOUR: CPT

## 2019-09-27 RX ORDER — TIOTROPIUM BROMIDE 18 UG/1
1 CAPSULE ORAL; RESPIRATORY (INHALATION) DAILY
Refills: 0 | Status: DISCONTINUED | OUTPATIENT
Start: 2019-09-27 | End: 2019-09-29

## 2019-09-27 RX ORDER — GABAPENTIN 400 MG/1
100 CAPSULE ORAL DAILY
Refills: 0 | Status: DISCONTINUED | OUTPATIENT
Start: 2019-09-27 | End: 2019-10-03

## 2019-09-27 RX ORDER — IPRATROPIUM/ALBUTEROL SULFATE 18-103MCG
3 AEROSOL WITH ADAPTER (GRAM) INHALATION ONCE
Refills: 0 | Status: COMPLETED | OUTPATIENT
Start: 2019-09-27 | End: 2019-09-27

## 2019-09-27 RX ORDER — DABIGATRAN ETEXILATE MESYLATE 150 MG/1
150 CAPSULE ORAL EVERY 12 HOURS
Refills: 0 | Status: DISCONTINUED | OUTPATIENT
Start: 2019-09-27 | End: 2019-10-03

## 2019-09-27 RX ORDER — AZITHROMYCIN 500 MG/1
500 TABLET, FILM COATED ORAL ONCE
Refills: 0 | Status: COMPLETED | OUTPATIENT
Start: 2019-09-27 | End: 2019-09-27

## 2019-09-27 RX ORDER — INFLUENZA VIRUS VACCINE 15; 15; 15; 15 UG/.5ML; UG/.5ML; UG/.5ML; UG/.5ML
0.5 SUSPENSION INTRAMUSCULAR ONCE
Refills: 0 | Status: COMPLETED | OUTPATIENT
Start: 2019-09-27 | End: 2019-09-27

## 2019-09-27 RX ORDER — DIGOXIN 250 MCG
0.25 TABLET ORAL DAILY
Refills: 0 | Status: DISCONTINUED | OUTPATIENT
Start: 2019-09-27 | End: 2019-10-03

## 2019-09-27 RX ORDER — METOPROLOL TARTRATE 50 MG
5 TABLET ORAL ONCE
Refills: 0 | Status: COMPLETED | OUTPATIENT
Start: 2019-09-27 | End: 2019-09-27

## 2019-09-27 RX ORDER — CEFTRIAXONE 500 MG/1
1000 INJECTION, POWDER, FOR SOLUTION INTRAMUSCULAR; INTRAVENOUS ONCE
Refills: 0 | Status: COMPLETED | OUTPATIENT
Start: 2019-09-27 | End: 2019-09-27

## 2019-09-27 RX ORDER — LEVOTHYROXINE SODIUM 125 MCG
75 TABLET ORAL DAILY
Refills: 0 | Status: DISCONTINUED | OUTPATIENT
Start: 2019-09-27 | End: 2019-10-03

## 2019-09-27 RX ORDER — FLUTICASONE PROPIONATE AND SALMETEROL 50; 250 UG/1; UG/1
1 POWDER ORAL; RESPIRATORY (INHALATION)
Qty: 0 | Refills: 0 | DISCHARGE

## 2019-09-27 RX ORDER — AZITHROMYCIN 500 MG/1
250 TABLET, FILM COATED ORAL DAILY
Refills: 0 | Status: DISCONTINUED | OUTPATIENT
Start: 2019-09-28 | End: 2019-09-29

## 2019-09-27 RX ORDER — MONTELUKAST 4 MG/1
10 TABLET, CHEWABLE ORAL DAILY
Refills: 0 | Status: DISCONTINUED | OUTPATIENT
Start: 2019-09-27 | End: 2019-10-03

## 2019-09-27 RX ORDER — CEFTRIAXONE 500 MG/1
1000 INJECTION, POWDER, FOR SOLUTION INTRAMUSCULAR; INTRAVENOUS EVERY 24 HOURS
Refills: 0 | Status: DISCONTINUED | OUTPATIENT
Start: 2019-09-28 | End: 2019-09-29

## 2019-09-27 RX ORDER — MAGNESIUM SULFATE 500 MG/ML
2 VIAL (ML) INJECTION ONCE
Refills: 0 | Status: COMPLETED | OUTPATIENT
Start: 2019-09-27 | End: 2019-09-27

## 2019-09-27 RX ORDER — AMIODARONE HYDROCHLORIDE 400 MG/1
200 TABLET ORAL
Qty: 0 | Refills: 0 | DISCHARGE

## 2019-09-27 RX ORDER — SODIUM CHLORIDE 9 MG/ML
2000 INJECTION INTRAMUSCULAR; INTRAVENOUS; SUBCUTANEOUS ONCE
Refills: 0 | Status: COMPLETED | OUTPATIENT
Start: 2019-09-27 | End: 2019-09-27

## 2019-09-27 RX ORDER — LEVOTHYROXINE SODIUM 125 MCG
75 TABLET ORAL
Qty: 0 | Refills: 0 | DISCHARGE

## 2019-09-27 RX ORDER — DIGOXIN 250 MCG
1 TABLET ORAL
Qty: 0 | Refills: 0 | DISCHARGE

## 2019-09-27 RX ORDER — BUDESONIDE AND FORMOTEROL FUMARATE DIHYDRATE 160; 4.5 UG/1; UG/1
2 AEROSOL RESPIRATORY (INHALATION)
Refills: 0 | Status: DISCONTINUED | OUTPATIENT
Start: 2019-09-27 | End: 2019-09-29

## 2019-09-27 RX ORDER — MONTELUKAST 4 MG/1
10 TABLET, CHEWABLE ORAL
Qty: 0 | Refills: 0 | DISCHARGE

## 2019-09-27 RX ORDER — DABIGATRAN ETEXILATE MESYLATE 150 MG/1
150 CAPSULE ORAL
Qty: 0 | Refills: 0 | DISCHARGE

## 2019-09-27 RX ADMIN — Medication 50 GRAM(S): at 19:59

## 2019-09-27 RX ADMIN — SODIUM CHLORIDE 2000 MILLILITER(S): 9 INJECTION INTRAMUSCULAR; INTRAVENOUS; SUBCUTANEOUS at 18:37

## 2019-09-27 RX ADMIN — CEFTRIAXONE 100 MILLIGRAM(S): 500 INJECTION, POWDER, FOR SOLUTION INTRAMUSCULAR; INTRAVENOUS at 18:36

## 2019-09-27 RX ADMIN — Medication 3 MILLILITER(S): at 19:59

## 2019-09-27 RX ADMIN — MONTELUKAST 10 MILLIGRAM(S): 4 TABLET, CHEWABLE ORAL at 23:11

## 2019-09-27 RX ADMIN — Medication 5 MILLIGRAM(S): at 19:58

## 2019-09-27 RX ADMIN — AZITHROMYCIN 500 MILLIGRAM(S): 500 TABLET, FILM COATED ORAL at 18:34

## 2019-09-27 RX ADMIN — DABIGATRAN ETEXILATE MESYLATE 150 MILLIGRAM(S): 150 CAPSULE ORAL at 23:11

## 2019-09-27 NOTE — H&P ADULT - NSHPSOCIALHISTORY_GEN_ALL_CORE
Tobacco:  ETOH:  Illicit drugs: Tobacco: Quit 30yrs ago, previously 2ppd x 20 yrs  ETOH: 1 glass wine/daily  Illicit drugs: denies

## 2019-09-27 NOTE — ED PROVIDER NOTE - DIAGNOSTIC INTERPRETATION
CXR wet read: The heart appears to be within normal limits in transverse diameter.  Hyperinflated lungs, LLL infiltrate.  The chest wall and surrounding bony structures appear normal.

## 2019-09-27 NOTE — ED PROVIDER NOTE - CLINICAL SUMMARY MEDICAL DECISION MAKING FREE TEXT BOX
Pt with progressive cough in ED with rapid afib and hypoxia improved with O2, IVF, mag, nebs, abx.  Likely pna with exacerbation of COPD.  WIll need symptom control inpt abx, and further tx.  Do not suspect pe, chf or asc given h/p.

## 2019-09-27 NOTE — H&P ADULT - HISTORY OF PRESENT ILLNESS
84 yr old F with a PMHX of COPD, Afib (on Pradaxa/Digoxin), hypothyroidism who presents to [facility] ED 9/27/19 c/o worsening SOB and cough x few days.  In ED, BP: 113/68, HR:124, RR:22, Temp: 98.9F, O2 sat: 98%  on NRB.   EKG revealed Afib with RVR@127BPM, no acute ST/T wave changes. Labs notable for: WBC 22.8 with left shift, lactate normal, BNP 1726. CXR with mild pulmonary vascular congestion with LLL infiltrate. Blood cultures and flu panel performed in ED.  Patient treated with Ceftriaxone 1g IV x 1 dose, Azithromycin 500mg PO x 1 dose, Magnesium sulfate 2g IV x 1 dose, Duoneb, and Lopressor 5mg IV x 1 dose.    Patient currently stable, admitted to [facility] for medical management of Afib with RVR in setting of PNA. 84 yr old F former heavy smoker with a PMHX of COPD, Afib (on Pradaxa/Digoxin), hypothyroidism who presents to [facility] ED 9/27/19 c/o worsening SOB and cough x few days. Patient states she has been feeling fatigued and had poor PO intake due to worsening SOB. Patient states she at baseline is able to walk ~1/2 block, however has been unable to take a few steps without feeling winded. Patient states her cough has been keeping her up at night, unable to expectorate sputum. Patient denies any fever, chills, chest pain, palpitations, recent PND, orthopnea or LE edema.  In ED, BP: 113/68, HR:124, RR:22, Temp: 98.9F, O2 sat: 98%  on NRB. EKG revealed Afib with RVR@127BPM, no acute ST/T wave changes. Labs notable for: WBC 22.8 with left shift, lactate normal, BNP 1726. CXR with hyperinflated lung and LLL infiltrate. Blood cultures and influenza/RVP panel performed in ED.  Patient treated with Ceftriaxone 1g IV x 1 dose, Azithromycin 500mg PO x 1 dose, Magnesium sulfate 2g IV x 1 dose, Duoneb, and Lopressor 5mg IV x 1 dose.    Patient currently stable, admitted to [facility] for medical management of Afib with RVR in setting of PNA.

## 2019-09-27 NOTE — ED PROVIDER NOTE - CARE PLAN
Principal Discharge DX:	Pneumonia of left lower lobe due to infectious organism  Secondary Diagnosis:	Atrial fibrillation with RVR

## 2019-09-27 NOTE — ED ADULT NURSE NOTE - NSIMPLEMENTINTERV_GEN_ALL_ED
Implemented All Fall with Harm Risk Interventions:  [place] to call system. Call bell, personal items and telephone within reach. Instruct patient to call for assistance. Room bathroom lighting operational. Non-slip footwear when patient is off stretcher. Physically safe environment: no spills, clutter or unnecessary equipment. Stretcher in lowest position, wheels locked, appropriate side rails in place. Provide visual cue, wrist band, yellow gown, etc. Monitor gait and stability. Monitor for mental status changes and reorient to person, place, and time. Review medications for side effects contributing to fall risk. Reinforce activity limits and safety measures with patient and family. Provide visual clues: red socks.

## 2019-09-27 NOTE — ED ADULT NURSE NOTE - OBJECTIVE STATEMENT
pt BIBA with SOB and cough x a few days , worse today , pt fell at home as was feeling weak and has been feeling weak x a few days , hx COPD , recent expectorant cough

## 2019-09-27 NOTE — H&P ADULT - ASSESSMENT
84 yr old F former heavy smoker with a PMHX of COPD, Afib (on Pradaxa/Digoxin), hypothyroidism who presents to [facility] ED 9/27/19 c/o worsening SOB and cough x few days, EKG revealed Afib with RVR, Labs notable for: WBC 22.8 with left shift, lactate normal, BNP 1726. CXR with hyperinflated lungs and LLL infiltrate. Blood cultures and influenza/RVP panel performed in ED.  Patient treated with Ceftriaxone 1g IV x 1 dose, Azithromycin 500mg PO x 1 dose, Magnesium sulfate 2g IV x 1 dose, Duoneb, and Lopressor 5mg IV x 1 dose.    Patient currently stable, admitted to [facility] for medical management of Afib with RVR in setting of PNA.

## 2019-09-27 NOTE — ED PROVIDER NOTE - OBJECTIVE STATEMENT
83 yo F with hx of afib on digoxin, COPD/asthma on steroids presenting with acutely worsening productive cough and sob x several days with generalized weakness.  Denies chest pain, fever, chills, recent travel, diarrhea or ab pain.  SOB is worse with activity.  Feels dehydrated.  No le edema or calf pain, no hx of DVT/PE or CHF.

## 2019-09-27 NOTE — H&P ADULT - NSICDXPASTSURGICALHX_GEN_ALL_CORE_FT
PAST SURGICAL HISTORY:  No significant past surgical history PAST SURGICAL HISTORY:  S/P hip replacement, right 2010

## 2019-09-27 NOTE — H&P ADULT - PROBLEM SELECTOR PLAN 1
currently rate controlled in 90s, continue Digoxin 0.25mg PO daily and Pradaxa 150mg PO q 12hrs.  --obtain Echo in AM.

## 2019-09-28 DIAGNOSIS — J96.90 RESPIRATORY FAILURE, UNSPECIFIED, UNSPECIFIED WHETHER WITH HYPOXIA OR HYPERCAPNIA: ICD-10-CM

## 2019-09-28 DIAGNOSIS — J18.9 PNEUMONIA, UNSPECIFIED ORGANISM: ICD-10-CM

## 2019-09-28 LAB
ANION GAP SERPL CALC-SCNC: 11 MMOL/L — SIGNIFICANT CHANGE UP (ref 5–17)
BASOPHILS # BLD AUTO: 0.03 K/UL — SIGNIFICANT CHANGE UP (ref 0–0.2)
BASOPHILS NFR BLD AUTO: 0.1 % — SIGNIFICANT CHANGE UP (ref 0–2)
BUN SERPL-MCNC: 16 MG/DL — SIGNIFICANT CHANGE UP (ref 7–23)
CALCIUM SERPL-MCNC: 9.3 MG/DL — SIGNIFICANT CHANGE UP (ref 8.4–10.5)
CHLORIDE SERPL-SCNC: 100 MMOL/L — SIGNIFICANT CHANGE UP (ref 96–108)
CHOLEST SERPL-MCNC: 139 MG/DL — SIGNIFICANT CHANGE UP (ref 10–199)
CK MB CFR SERPL CALC: 2 NG/ML — SIGNIFICANT CHANGE UP (ref 0–6.7)
CK SERPL-CCNC: 69 U/L — SIGNIFICANT CHANGE UP (ref 25–170)
CO2 SERPL-SCNC: 25 MMOL/L — SIGNIFICANT CHANGE UP (ref 22–31)
CREAT SERPL-MCNC: 0.74 MG/DL — SIGNIFICANT CHANGE UP (ref 0.5–1.3)
EOSINOPHIL # BLD AUTO: 0 K/UL — SIGNIFICANT CHANGE UP (ref 0–0.5)
EOSINOPHIL NFR BLD AUTO: 0 % — SIGNIFICANT CHANGE UP (ref 0–6)
GLUCOSE SERPL-MCNC: 133 MG/DL — HIGH (ref 70–99)
HCT VFR BLD CALC: 39.3 % — SIGNIFICANT CHANGE UP (ref 34.5–45)
HDLC SERPL-MCNC: 73 MG/DL — SIGNIFICANT CHANGE UP
HGB BLD-MCNC: 12.5 G/DL — SIGNIFICANT CHANGE UP (ref 11.5–15.5)
IMM GRANULOCYTES NFR BLD AUTO: 0.3 % — SIGNIFICANT CHANGE UP (ref 0–1.5)
LIPID PNL WITH DIRECT LDL SERPL: 58 MG/DL — SIGNIFICANT CHANGE UP
LYMPHOCYTES # BLD AUTO: 0.83 K/UL — LOW (ref 1–3.3)
LYMPHOCYTES # BLD AUTO: 3.6 % — LOW (ref 13–44)
MAGNESIUM SERPL-MCNC: 2.6 MG/DL — SIGNIFICANT CHANGE UP (ref 1.6–2.6)
MCHC RBC-ENTMCNC: 31.8 GM/DL — LOW (ref 32–36)
MCHC RBC-ENTMCNC: 32.1 PG — SIGNIFICANT CHANGE UP (ref 27–34)
MCV RBC AUTO: 100.8 FL — HIGH (ref 80–100)
MONOCYTES # BLD AUTO: 1.13 K/UL — HIGH (ref 0–0.9)
MONOCYTES NFR BLD AUTO: 4.8 % — SIGNIFICANT CHANGE UP (ref 2–14)
NEUTROPHILS # BLD AUTO: 21.29 K/UL — HIGH (ref 1.8–7.4)
NEUTROPHILS NFR BLD AUTO: 91.2 % — HIGH (ref 43–77)
NRBC # BLD: 0 /100 WBCS — SIGNIFICANT CHANGE UP (ref 0–0)
PLATELET # BLD AUTO: 282 K/UL — SIGNIFICANT CHANGE UP (ref 150–400)
POTASSIUM SERPL-MCNC: 4.8 MMOL/L — SIGNIFICANT CHANGE UP (ref 3.5–5.3)
POTASSIUM SERPL-SCNC: 4.8 MMOL/L — SIGNIFICANT CHANGE UP (ref 3.5–5.3)
RBC # BLD: 3.9 M/UL — SIGNIFICANT CHANGE UP (ref 3.8–5.2)
RBC # FLD: 13.4 % — SIGNIFICANT CHANGE UP (ref 10.3–14.5)
SODIUM SERPL-SCNC: 136 MMOL/L — SIGNIFICANT CHANGE UP (ref 135–145)
TOTAL CHOLESTEROL/HDL RATIO MEASUREMENT: 1.9 RATIO — LOW (ref 3.3–7.1)
TRIGL SERPL-MCNC: 41 MG/DL — SIGNIFICANT CHANGE UP (ref 10–149)
TROPONIN T SERPL-MCNC: <0.01 NG/ML — SIGNIFICANT CHANGE UP (ref 0–0.01)
TSH SERPL-MCNC: 0.37 UIU/ML — SIGNIFICANT CHANGE UP (ref 0.35–4.94)
WBC # BLD: 22.05 K/UL — HIGH (ref 3.8–10.5)
WBC # FLD AUTO: 22.05 K/UL — HIGH (ref 3.8–10.5)

## 2019-09-28 PROCEDURE — 93306 TTE W/DOPPLER COMPLETE: CPT | Mod: 26

## 2019-09-28 PROCEDURE — 99223 1ST HOSP IP/OBS HIGH 75: CPT | Mod: GC

## 2019-09-28 RX ADMIN — AZITHROMYCIN 250 MILLIGRAM(S): 500 TABLET, FILM COATED ORAL at 11:12

## 2019-09-28 RX ADMIN — DABIGATRAN ETEXILATE MESYLATE 150 MILLIGRAM(S): 150 CAPSULE ORAL at 11:09

## 2019-09-28 RX ADMIN — BUDESONIDE AND FORMOTEROL FUMARATE DIHYDRATE 2 PUFF(S): 160; 4.5 AEROSOL RESPIRATORY (INHALATION) at 17:51

## 2019-09-28 RX ADMIN — Medication 75 MICROGRAM(S): at 07:26

## 2019-09-28 RX ADMIN — MONTELUKAST 10 MILLIGRAM(S): 4 TABLET, CHEWABLE ORAL at 22:19

## 2019-09-28 RX ADMIN — GABAPENTIN 100 MILLIGRAM(S): 400 CAPSULE ORAL at 11:09

## 2019-09-28 RX ADMIN — CEFTRIAXONE 100 MILLIGRAM(S): 500 INJECTION, POWDER, FOR SOLUTION INTRAMUSCULAR; INTRAVENOUS at 11:10

## 2019-09-28 RX ADMIN — TIOTROPIUM BROMIDE 1 CAPSULE(S): 18 CAPSULE ORAL; RESPIRATORY (INHALATION) at 12:34

## 2019-09-28 RX ADMIN — Medication 0.25 MILLIGRAM(S): at 07:26

## 2019-09-28 RX ADMIN — DABIGATRAN ETEXILATE MESYLATE 150 MILLIGRAM(S): 150 CAPSULE ORAL at 22:19

## 2019-09-28 NOTE — PROGRESS NOTE ADULT - PROBLEM SELECTOR PLAN 2
currently afebrile, sating 98% on 4LC NC, WBC still 22, bands to 6%. CXR not convincing for infiltrate although there is left sided haziness. RVP negative. Bcx negative to date   -will continue Ceftriaxone 1g IV q 24hrs and Azithromycin 250mg PO until 10/1  -will f/u w/ Dr. Mathias on outpatient sputum cx currently afebrile, sating 98% on 4LC NC, WBC still 22, bands to 6%. CXR not convincing for infiltrate although there is left sided haziness ?basilar infiltrate. RVP negative. Bcx negative to date   -will continue Ceftriaxone 1g IV q 24hrs and Azithromycin 250mg PO until 10/1  -will f/u w/ Dr. Mathias on outpatient sputum cx

## 2019-09-28 NOTE — CONSULT NOTE ADULT - ASSESSMENT
84 yr old F former heavy smoker with a PMHX of COPD of unknown severity, Afib (on Pradaxa/Digoxin), who presents to [facility] ED 9/27/19 c/o worsening SOB and cough.       RVP negative. Bcx negative to date   -will continue Ceftriaxone 1g IV q 24hrs and Azithromycin 250mg PO until 10/1  -will f/u w/ Dr. Mathias on outpatient sputum cx.

## 2019-09-28 NOTE — CONSULT NOTE ADULT - PROBLEM SELECTOR RECOMMENDATION 3
Not sure what the severity is at baseline.   - c/w LAMA / LABA/ ICS  - C/w Singulair.   - No need for oral steroids.  - Former smoker.

## 2019-09-28 NOTE — CONSULT NOTE ADULT - SUBJECTIVE AND OBJECTIVE BOX
PULMONARY SERVICE INITIAL CONSULT NOTE    HPI:  84 yr old F former heavy smoker with a PMHX of COPD, Afib (on Pradaxa/Digoxin), hypothyroidism who presents to [facility] ED 9/27/19 c/o worsening SOB and cough x few days. Patient states she has been feeling fatigued and had poor PO intake due to worsening SOB. Patient states she at baseline is able to walk ~1/2 block, however has been unable to take a few steps without feeling winded. Patient states her cough has been keeping her up at night, unable to expectorate sputum. Patient denies any fever, chills, chest pain, palpitations, recent PND, orthopnea or LE edema.  In ED, BP: 113/68, HR:124, RR:22, Temp: 98.9F, O2 sat: 98%  on NRB. EKG revealed Afib with RVR@127BPM, no acute ST/T wave changes. Labs notable for: WBC 22.8 with left shift, lactate normal, BNP 1726. CXR with hyperinflated lung and LLL infiltrate. Blood cultures and influenza/RVP panel performed in ED.  Patient treated with Ceftriaxone 1g IV x 1 dose, Azithromycin 500mg PO x 1 dose, Magnesium sulfate 2g IV x 1 dose, Duoneb, and Lopressor 5mg IV x 1 dose.    Patient currently stable, admitted to [facility] for medical management of Afib with RVR in setting of PNA. (27 Sep 2019 21:30)      REVIEW OF SYSTEMS:  Constitutional: No fever, weight loss or fatigue  Eyes: No eye pain, visual disturbances, or discharge  ENMT:  No difficulty hearing, tinnitus, vertigo; No sinus or throat pain  Neck: No pain, stiffness or neck swelling  Respiratory: see HPI  Cardiovascular: No chest pain, palpitations, dizziness or leg swelling  Gastrointestinal: No abdominal or epigastric pain. No nausea, vomiting or hematemesis; No diarrhea or constipation. No melena or hematochezia.  Genitourinary: No dysuria, frequency, hematuria or incontinence  Neurological: No headaches, memory loss, loss of strength, numbness or tremors  Skin: No itching, burning, rashes or lesions   Lymph Nodes: No enlarged glands  Endocrine: No heat or cold intolerance; No hair loss  Musculoskeletal: No joint pain or swelling; No muscle, back or extremity pain  Psychiatric: No depression, anxiety, mood swings or difficulty sleeping  Heme/Lymph: No easy bruising or bleeding gums  Allergy and Immunologic: No hives or eczema    PAST MEDICAL & SURGICAL HISTORY:  Atrial fibrillation  Asthma  Bronchitis  S/P hip replacement, right: 2010      FAMILY HISTORY:  No pertinent family history in first degree relatives      SOCIAL HISTORY:  Smoking Status: [ ] Current, [ ] Former, [ ] Never  Pack Years:    MEDICATIONS:  Pulmonary:  buDESOnide 160 MICROgram(s)/formoterol 4.5 MICROgram(s) Inhaler 2 Puff(s) Inhalation two times a day  montelukast 10 milliGRAM(s) Oral daily  tiotropium 18 MICROgram(s) Capsule 1 Capsule(s) Inhalation daily    Antimicrobials:  azithromycin   Tablet 250 milliGRAM(s) Oral daily  cefTRIAXone   IVPB 1000 milliGRAM(s) IV Intermittent every 24 hours    Anticoagulants:  dabigatran 150 milliGRAM(s) Oral every 12 hours    Onc:    GI/:    Endocrine:  levothyroxine 75 MICROGram(s) Oral daily    Cardiac:  digoxin     Tablet 0.25 milliGRAM(s) Oral daily    Other Medications:  gabapentin 100 milliGRAM(s) Oral daily  influenza   Vaccine 0.5 milliLiter(s) IntraMuscular once      Allergies    Mustard (Anaphylaxis)  No Known Drug Allergies    Intolerances        Vital Signs Last 24 Hrs  T(C): 36.3 (28 Sep 2019 13:50), Max: 37.2 (27 Sep 2019 17:39)  T(F): 97.4 (28 Sep 2019 13:50), Max: 98.9 (27 Sep 2019 17:39)  HR: 96 (28 Sep 2019 11:46) (82 - 129)  BP: 141/83 (28 Sep 2019 11:46) (113/68 - 199/98)  BP(mean): 89 (27 Sep 2019 21:30) (89 - 89)  RR: 18 (28 Sep 2019 11:46) (16 - 24)  SpO2: 95% (28 Sep 2019 11:46) (94% - 99%)    09-28 @ 07:01  -  09-28 @ 14:16  --------------------------------------------------------  IN: 534 mL / OUT: 0 mL / NET: 534 mL          PHYSICAL EXAM:  Constitutional: WDWN  Head: NC/AT  EENT: PERRL, anicteric sclera; oropharynx clear, MMM  Neck: supple, no appreciable JVD  Respiratory: CTA B/L; no W/R/R  Cardiovascular: +S1/S2, RRR  Gastrointestinal: soft, NT/ND; +BSx4  Extremities: WWP; no edema, clubbing or cyanosis  Vascular: 2+ radial, DP/PT pulses B/L  Neurological: AAOx3; no focal deficits    LABS:      CBC Full  -  ( 28 Sep 2019 05:55 )  WBC Count : 22.05 K/uL  RBC Count : 3.90 M/uL  Hemoglobin : 12.5 g/dL  Hematocrit : 39.3 %  Platelet Count - Automated : 282 K/uL  Mean Cell Volume : 100.8 fl  Mean Cell Hemoglobin : 32.1 pg  Mean Cell Hemoglobin Concentration : 31.8 gm/dL  Auto Neutrophil # : 21.29 K/uL  Auto Lymphocyte # : 0.83 K/uL  Auto Monocyte # : 1.13 K/uL  Auto Eosinophil # : 0.00 K/uL  Auto Basophil # : 0.03 K/uL  Auto Neutrophil % : 91.2 %  Auto Lymphocyte % : 3.6 %  Auto Monocyte % : 4.8 %  Auto Eosinophil % : 0.0 %  Auto Basophil % : 0.1 %    09-28    136  |  100  |  16  ----------------------------<  133<H>  4.8   |  25  |  0.74    Ca    9.3      28 Sep 2019 05:55  Mg     2.6     09-28    TPro  6.9  /  Alb  3.7  /  TBili  1.3<H>  /  DBili  x   /  AST  19  /  ALT  7<L>  /  AlkPhos  81  09-27    PT/INR - ( 27 Sep 2019 18:18 )   PT: 16.7 sec;   INR: 1.46          PTT - ( 27 Sep 2019 18:18 )  PTT:46.4 sec    RADIOLOGY & ADDITIONAL STUDIES:  CXR reviewed. PULMONARY SERVICE INITIAL CONSULT NOTE    HPI:  84 yr old F former heavy smoker with a PMHX of COPD, Afib (on Pradaxa/Digoxin), who presents to [facility] ED 9/27/19 c/o worsening SOB and cough x few days. Patient states she has been feeling fatigued and had poor PO intake due to worsening SOB. Patient states she at baseline is able to walk ~1/2 block, however has been unable to take a few steps without feeling winded. Patient states her cough has been keeping her up at night, unable to expectorate sputum. Patient denies any fever, chills, chest pain, palpitations, recent PND, orthopnea or LE edema.  In ED, BP: 113/68, HR:124, RR:22, Temp: 98.9F, O2 sat: 98%  on NRB. EKG revealed Afib with RVR@127BPM, no acute ST/T wave changes. Labs notable for: WBC 22.8 with left shift, lactate normal, BNP 1726. CXR with hyperinflated lung and LLL infiltrate. Blood cultures and influenza/RVP panel performed in ED.  Patient treated with Ceftriaxone 1g IV x 1 dose, Azithromycin 500mg PO x 1 dose, Magnesium sulfate 2g IV x 1 dose, Duoneb, and Lopressor 5mg IV x 1 dose.    Patient currently stable, admitted to [facility] for medical management of Afib with RVR in setting of PNA. (27 Sep 2019 21:30)      REVIEW OF SYSTEMS:  Constitutional: No fever, weight loss or fatigue  Eyes: No eye pain, visual disturbances, or discharge  ENMT:  No difficulty hearing, tinnitus, vertigo; No sinus or throat pain  Neck: No pain, stiffness or neck swelling  Respiratory: see HPI  Cardiovascular: No chest pain, palpitations, dizziness or leg swelling  Gastrointestinal: No abdominal or epigastric pain. No nausea, vomiting or hematemesis; No diarrhea or constipation. No melena or hematochezia.  Genitourinary: No dysuria, frequency, hematuria or incontinence  Neurological: No headaches, memory loss, loss of strength, numbness or tremors  Skin: No itching, burning, rashes or lesions   Lymph Nodes: No enlarged glands  Endocrine: No heat or cold intolerance; No hair loss  Musculoskeletal: No joint pain or swelling; No muscle, back or extremity pain  Psychiatric: No depression, anxiety, mood swings or difficulty sleeping  Heme/Lymph: No easy bruising or bleeding gums  Allergy and Immunologic: No hives or eczema    PAST MEDICAL & SURGICAL HISTORY:  Atrial fibrillation  Asthma  Bronchitis  S/P hip replacement, right: 2010      FAMILY HISTORY:  No pertinent family history in first degree relatives      SOCIAL HISTORY:  Smoking Status: [ ] Current, [x ] Former, [x] Never  Pack Years:    MEDICATIONS:  Pulmonary:  buDESOnide 160 MICROgram(s)/formoterol 4.5 MICROgram(s) Inhaler 2 Puff(s) Inhalation two times a day  montelukast 10 milliGRAM(s) Oral daily  tiotropium 18 MICROgram(s) Capsule 1 Capsule(s) Inhalation daily    Antimicrobials:  azithromycin   Tablet 250 milliGRAM(s) Oral daily  cefTRIAXone   IVPB 1000 milliGRAM(s) IV Intermittent every 24 hours    Anticoagulants:  dabigatran 150 milliGRAM(s) Oral every 12 hours    Onc:    GI/:    Endocrine:  levothyroxine 75 MICROGram(s) Oral daily    Cardiac:  digoxin     Tablet 0.25 milliGRAM(s) Oral daily    Other Medications:  gabapentin 100 milliGRAM(s) Oral daily  influenza   Vaccine 0.5 milliLiter(s) IntraMuscular once      Allergies    Mustard (Anaphylaxis)  No Known Drug Allergies    Intolerances        Vital Signs Last 24 Hrs  T(C): 36.3 (28 Sep 2019 13:50), Max: 37.2 (27 Sep 2019 17:39)  T(F): 97.4 (28 Sep 2019 13:50), Max: 98.9 (27 Sep 2019 17:39)  HR: 96 (28 Sep 2019 11:46) (82 - 129)  BP: 141/83 (28 Sep 2019 11:46) (113/68 - 199/98)  BP(mean): 89 (27 Sep 2019 21:30) (89 - 89)  RR: 18 (28 Sep 2019 11:46) (16 - 24)  SpO2: 95% (28 Sep 2019 11:46) (94% - 99%)    09-28 @ 07:01  -  09-28 @ 14:16  --------------------------------------------------------  IN: 534 mL / OUT: 0 mL / NET: 534 mL          PHYSICAL EXAM:  Constitutional: WDWN  Head: NC/AT  EENT: PERRL, anicteric sclera; oropharynx clear, MMM  Neck: supple, no appreciable JVD  Respiratory: CTA B/L; no W/R/R  Cardiovascular: +S1/S2, RRR  Gastrointestinal: soft, NT/ND; +BSx4  Extremities: WWP; no edema, clubbing or cyanosis  Vascular: 2+ radial, DP/PT pulses B/L  Neurological: AAOx3; no focal deficits    LABS:      CBC Full  -  ( 28 Sep 2019 05:55 )  WBC Count : 22.05 K/uL  RBC Count : 3.90 M/uL  Hemoglobin : 12.5 g/dL  Hematocrit : 39.3 %  Platelet Count - Automated : 282 K/uL  Mean Cell Volume : 100.8 fl  Mean Cell Hemoglobin : 32.1 pg  Mean Cell Hemoglobin Concentration : 31.8 gm/dL  Auto Neutrophil # : 21.29 K/uL  Auto Lymphocyte # : 0.83 K/uL  Auto Monocyte # : 1.13 K/uL  Auto Eosinophil # : 0.00 K/uL  Auto Basophil # : 0.03 K/uL  Auto Neutrophil % : 91.2 %  Auto Lymphocyte % : 3.6 %  Auto Monocyte % : 4.8 %  Auto Eosinophil % : 0.0 %  Auto Basophil % : 0.1 %    09-28    136  |  100  |  16  ----------------------------<  133<H>  4.8   |  25  |  0.74    Ca    9.3      28 Sep 2019 05:55  Mg     2.6     09-28    TPro  6.9  /  Alb  3.7  /  TBili  1.3<H>  /  DBili  x   /  AST  19  /  ALT  7<L>  /  AlkPhos  81  09-27    PT/INR - ( 27 Sep 2019 18:18 )   PT: 16.7 sec;   INR: 1.46          PTT - ( 27 Sep 2019 18:18 )  PTT:46.4 sec    RADIOLOGY & ADDITIONAL STUDIES:  CXR reviewed. PULMONARY SERVICE INITIAL CONSULT NOTE    HPI:  84 yr old F former heavy smoker with a PMHX of COPD, Afib (on Pradaxa/Digoxin), who presents to [facility] ED 9/27/19 c/o worsening SOB and cough x few days. Patient states she has been feeling fatigued and had poor PO intake due to worsening SOB. Patient states she at baseline is able to walk ~1/2 block, however has been unable to take a few steps without feeling winded. Patient states her cough has been keeping her up at night, unable to expectorate sputum. Patient denies any fever, chills, chest pain, palpitations, recent PND, orthopnea or LE edema.  In ED, BP: 113/68, HR:124, RR:22, Temp: 98.9F, O2 sat: 98%  on NRB. EKG revealed Afib with RVR@127BPM, no acute ST/T wave changes. Labs notable for: WBC 22.8 with left shift, lactate normal, BNP 1726. CXR with hyperinflated lung and LLL infiltrate. Blood cultures and influenza/RVP panel performed in ED.  Patient treated with Ceftriaxone 1g IV x 1 dose, Azithromycin 500mg PO x 1 dose, Magnesium sulfate 2g IV x 1 dose, Duoneb, and Lopressor 5mg IV x 1 dose.    Patient currently stable, admitted to [facility] for medical management of Afib with RVR in setting of PNA. (27 Sep 2019 21:30)      REVIEW OF SYSTEMS:  Constitutional: No fever, weight loss or fatigue  Eyes: No eye pain, visual disturbances, or discharge  ENMT:  No difficulty hearing, tinnitus, vertigo; No sinus or throat pain  Neck: No pain, stiffness or neck swelling  Respiratory: see HPI  Cardiovascular: No chest pain, palpitations, dizziness or leg swelling  Gastrointestinal: No abdominal or epigastric pain. No nausea, vomiting or hematemesis; No diarrhea or constipation. No melena or hematochezia.  Genitourinary: No dysuria, frequency, hematuria or incontinence  Neurological: No headaches, memory loss, loss of strength, numbness or tremors  Skin: No itching, burning, rashes or lesions   Lymph Nodes: No enlarged glands  Endocrine: No heat or cold intolerance; No hair loss  Musculoskeletal: No joint pain or swelling; No muscle, back or extremity pain  Psychiatric: No depression, anxiety, mood swings or difficulty sleeping  Heme/Lymph: No easy bruising or bleeding gums  Allergy and Immunologic: No hives or eczema    PAST MEDICAL & SURGICAL HISTORY:  Atrial fibrillation  Asthma  Bronchitis  S/P hip replacement, right: 2010      FAMILY HISTORY:  No pertinent family history in first degree relatives      SOCIAL HISTORY:  Smoking Status: [ ] Current, [x ] Former, [ ] Never  Pack Years:    MEDICATIONS:  Pulmonary:  buDESOnide 160 MICROgram(s)/formoterol 4.5 MICROgram(s) Inhaler 2 Puff(s) Inhalation two times a day  montelukast 10 milliGRAM(s) Oral daily  tiotropium 18 MICROgram(s) Capsule 1 Capsule(s) Inhalation daily    Antimicrobials:  azithromycin   Tablet 250 milliGRAM(s) Oral daily  cefTRIAXone   IVPB 1000 milliGRAM(s) IV Intermittent every 24 hours    Anticoagulants:  dabigatran 150 milliGRAM(s) Oral every 12 hours    Onc:    GI/:    Endocrine:  levothyroxine 75 MICROGram(s) Oral daily    Cardiac:  digoxin     Tablet 0.25 milliGRAM(s) Oral daily    Other Medications:  gabapentin 100 milliGRAM(s) Oral daily  influenza   Vaccine 0.5 milliLiter(s) IntraMuscular once      Allergies    Mustard (Anaphylaxis)  No Known Drug Allergies    Intolerances        Vital Signs Last 24 Hrs  T(C): 36.3 (28 Sep 2019 13:50), Max: 37.2 (27 Sep 2019 17:39)  T(F): 97.4 (28 Sep 2019 13:50), Max: 98.9 (27 Sep 2019 17:39)  HR: 96 (28 Sep 2019 11:46) (82 - 129)  BP: 141/83 (28 Sep 2019 11:46) (113/68 - 199/98)  BP(mean): 89 (27 Sep 2019 21:30) (89 - 89)  RR: 18 (28 Sep 2019 11:46) (16 - 24)  SpO2: 95% (28 Sep 2019 11:46) (94% - 99%)    09-28 @ 07:01  -  09-28 @ 14:16  --------------------------------------------------------  IN: 534 mL / OUT: 0 mL / NET: 534 mL          PHYSICAL EXAM:  Constitutional: WDWN  Head: NC/AT  EENT: PERRL, anicteric sclera; oropharynx clear, MMM  Neck: supple, no appreciable JVD  Respiratory: faint , expiratory wheezing   Cardiovascular: +S1/S2, RRR  Gastrointestinal: soft, NT/ND; +BSx4  Extremities: WWP; no edema, clubbing or cyanosis  Vascular: 2+ radial, DP/PT pulses B/L  Neurological: AAOx3; no focal deficits    LABS:      CBC Full  -  ( 28 Sep 2019 05:55 )  WBC Count : 22.05 K/uL  RBC Count : 3.90 M/uL  Hemoglobin : 12.5 g/dL  Hematocrit : 39.3 %  Platelet Count - Automated : 282 K/uL  Mean Cell Volume : 100.8 fl  Mean Cell Hemoglobin : 32.1 pg  Mean Cell Hemoglobin Concentration : 31.8 gm/dL  Auto Neutrophil # : 21.29 K/uL  Auto Lymphocyte # : 0.83 K/uL  Auto Monocyte # : 1.13 K/uL  Auto Eosinophil # : 0.00 K/uL  Auto Basophil # : 0.03 K/uL  Auto Neutrophil % : 91.2 %  Auto Lymphocyte % : 3.6 %  Auto Monocyte % : 4.8 %  Auto Eosinophil % : 0.0 %  Auto Basophil % : 0.1 %    09-28    136  |  100  |  16  ----------------------------<  133<H>  4.8   |  25  |  0.74    Ca    9.3      28 Sep 2019 05:55  Mg     2.6     09-28    TPro  6.9  /  Alb  3.7  /  TBili  1.3<H>  /  DBili  x   /  AST  19  /  ALT  7<L>  /  AlkPhos  81  09-27    PT/INR - ( 27 Sep 2019 18:18 )   PT: 16.7 sec;   INR: 1.46          PTT - ( 27 Sep 2019 18:18 )  PTT:46.4 sec    RADIOLOGY & ADDITIONAL STUDIES:  CXR reviewed.

## 2019-09-28 NOTE — CONSULT NOTE ADULT - PROBLEM SELECTOR RECOMMENDATION 9
Afebrile but with Elevated WBC with left shift, bandemia +, CXR shows retrocardiac opacity, she has productive yellow color sputum.   - Treat as CAP  - Agree with Ceftriaxone and azithromycin for now.   - Follow Culture data from Dr Victoria office. Resend another sample here.   - Trend fever and wbc curve.   - Incentive spirometry, OOB.   - Wean O2 to a goal of Spo2 >88%.

## 2019-09-28 NOTE — CONSULT NOTE ADULT - ATTENDING COMMENTS
Known COPD in a former smoker with SOB and cough. Found to have Afib with RVR and left lower lobe pneumonia. Doubt COPD exacerbation but if not improved with rate control and antibiotics in the next 24 hours will add prednisone. Bronchodilators. CAP coverage. Pulmonary toilet.

## 2019-09-29 DIAGNOSIS — I10 ESSENTIAL (PRIMARY) HYPERTENSION: ICD-10-CM

## 2019-09-29 LAB
ANION GAP SERPL CALC-SCNC: 8 MMOL/L — SIGNIFICANT CHANGE UP (ref 5–17)
BUN SERPL-MCNC: 13 MG/DL — SIGNIFICANT CHANGE UP (ref 7–23)
CALCIUM SERPL-MCNC: 9.4 MG/DL — SIGNIFICANT CHANGE UP (ref 8.4–10.5)
CHLORIDE SERPL-SCNC: 101 MMOL/L — SIGNIFICANT CHANGE UP (ref 96–108)
CO2 SERPL-SCNC: 26 MMOL/L — SIGNIFICANT CHANGE UP (ref 22–31)
CREAT SERPL-MCNC: 0.58 MG/DL — SIGNIFICANT CHANGE UP (ref 0.5–1.3)
GLUCOSE SERPL-MCNC: 91 MG/DL — SIGNIFICANT CHANGE UP (ref 70–99)
HCT VFR BLD CALC: 38.3 % — SIGNIFICANT CHANGE UP (ref 34.5–45)
HGB BLD-MCNC: 12 G/DL — SIGNIFICANT CHANGE UP (ref 11.5–15.5)
MAGNESIUM SERPL-MCNC: 2.2 MG/DL — SIGNIFICANT CHANGE UP (ref 1.6–2.6)
MCHC RBC-ENTMCNC: 31.3 GM/DL — LOW (ref 32–36)
MCHC RBC-ENTMCNC: 32.1 PG — SIGNIFICANT CHANGE UP (ref 27–34)
MCV RBC AUTO: 102.4 FL — HIGH (ref 80–100)
NRBC # BLD: 0 /100 WBCS — SIGNIFICANT CHANGE UP (ref 0–0)
PLATELET # BLD AUTO: 283 K/UL — SIGNIFICANT CHANGE UP (ref 150–400)
POTASSIUM SERPL-MCNC: 4.7 MMOL/L — SIGNIFICANT CHANGE UP (ref 3.5–5.3)
POTASSIUM SERPL-SCNC: 4.7 MMOL/L — SIGNIFICANT CHANGE UP (ref 3.5–5.3)
RBC # BLD: 3.74 M/UL — LOW (ref 3.8–5.2)
RBC # FLD: 13.3 % — SIGNIFICANT CHANGE UP (ref 10.3–14.5)
SODIUM SERPL-SCNC: 135 MMOL/L — SIGNIFICANT CHANGE UP (ref 135–145)
WBC # BLD: 18 K/UL — HIGH (ref 3.8–10.5)
WBC # FLD AUTO: 18 K/UL — HIGH (ref 3.8–10.5)

## 2019-09-29 PROCEDURE — 99233 SBSQ HOSP IP/OBS HIGH 50: CPT | Mod: GC

## 2019-09-29 RX ORDER — IPRATROPIUM/ALBUTEROL SULFATE 18-103MCG
3 AEROSOL WITH ADAPTER (GRAM) INHALATION EVERY 6 HOURS
Refills: 0 | Status: DISCONTINUED | OUTPATIENT
Start: 2019-09-29 | End: 2019-09-29

## 2019-09-29 RX ORDER — AMLODIPINE BESYLATE 2.5 MG/1
5 TABLET ORAL ONCE
Refills: 0 | Status: COMPLETED | OUTPATIENT
Start: 2019-09-29 | End: 2019-09-29

## 2019-09-29 RX ORDER — IPRATROPIUM/ALBUTEROL SULFATE 18-103MCG
3 AEROSOL WITH ADAPTER (GRAM) INHALATION EVERY 4 HOURS
Refills: 0 | Status: DISCONTINUED | OUTPATIENT
Start: 2019-09-29 | End: 2019-10-03

## 2019-09-29 RX ADMIN — Medication 60 MILLIGRAM(S): at 14:40

## 2019-09-29 RX ADMIN — DABIGATRAN ETEXILATE MESYLATE 150 MILLIGRAM(S): 150 CAPSULE ORAL at 06:25

## 2019-09-29 RX ADMIN — Medication 3 MILLILITER(S): at 14:40

## 2019-09-29 RX ADMIN — Medication 0.25 MILLIGRAM(S): at 06:25

## 2019-09-29 RX ADMIN — Medication 3 MILLILITER(S): at 19:00

## 2019-09-29 RX ADMIN — Medication 75 MICROGRAM(S): at 06:25

## 2019-09-29 RX ADMIN — GABAPENTIN 100 MILLIGRAM(S): 400 CAPSULE ORAL at 11:04

## 2019-09-29 RX ADMIN — AZITHROMYCIN 250 MILLIGRAM(S): 500 TABLET, FILM COATED ORAL at 11:04

## 2019-09-29 RX ADMIN — DABIGATRAN ETEXILATE MESYLATE 150 MILLIGRAM(S): 150 CAPSULE ORAL at 19:00

## 2019-09-29 RX ADMIN — Medication 3 MILLILITER(S): at 09:27

## 2019-09-29 RX ADMIN — Medication 3 MILLILITER(S): at 22:27

## 2019-09-29 RX ADMIN — AMLODIPINE BESYLATE 5 MILLIGRAM(S): 2.5 TABLET ORAL at 11:18

## 2019-09-29 RX ADMIN — BUDESONIDE AND FORMOTEROL FUMARATE DIHYDRATE 2 PUFF(S): 160; 4.5 AEROSOL RESPIRATORY (INHALATION) at 06:27

## 2019-09-29 RX ADMIN — CEFTRIAXONE 100 MILLIGRAM(S): 500 INJECTION, POWDER, FOR SOLUTION INTRAMUSCULAR; INTRAVENOUS at 11:04

## 2019-09-29 RX ADMIN — MONTELUKAST 10 MILLIGRAM(S): 4 TABLET, CHEWABLE ORAL at 11:04

## 2019-09-29 RX ADMIN — TIOTROPIUM BROMIDE 1 CAPSULE(S): 18 CAPSULE ORAL; RESPIRATORY (INHALATION) at 11:04

## 2019-09-29 NOTE — PROGRESS NOTE ADULT - PROBLEM SELECTOR PLAN 2
currently afebrile, sating 98% on 4LC NC, WBC still 22, bands to 6%. CXR not convincing for infiltrate although there is left sided haziness ?basilar infiltrate. RVP negative. Bcx negative to date   -will continue Ceftriaxone 1g IV q 24hrs and Azithromycin 250mg PO until 10/1  -will f/u w/ Dr. Mathias on outpatient sputum cx currently afebrile, sating 98% on 4LC NC, WBC still 22, bands to 6%. CXR not convincing for infiltrate although there is left sided haziness ?basilar infiltrate. RVP negative. Bcx negative to date   - Outpatient sputum cx resulted pansensitive pseudomonas aeruginosa  - Switched ceftraixone azitromycin to levofloxacin 750 mg PO q24h, ordered for 5 days pending improvement

## 2019-09-29 NOTE — PROGRESS NOTE ADULT - ATTENDING COMMENTS
Known COPD in a former smoker with SOB and cough. Found to have Afib with RVR and left lower lobe pneumonia. Marginally improved so prednisone started. Sputum with Pseudomonas; antibiotics changed as per sensitivities. Will get CT chest to evaluate parenchyma. OOB. Pulmonary toilet.

## 2019-09-29 NOTE — PROGRESS NOTE ADULT - PROBLEM SELECTOR PLAN 3
Problem: Patient Care Overview  Goal: Plan of Care Review  Outcome: Ongoing (interventions implemented as appropriate)  Pt Rupinder Tx       -continue Symbicort 160mcg 2 puffs BID, Singulair 10mg PO daily and Duonebs PRN. - Tiotropium respimat 1 puff qdaily  - Fluticasone-salmeterol 500-50 1 puff BID  - Singulair 10mg PO daily  - Duonebs q4h standing  - Hycodan for cough relief  - Solumedrol 60 mg IV q24h started 9/29/2019

## 2019-09-29 NOTE — PROGRESS NOTE ADULT - PROBLEM SELECTOR PLAN 2
Still think this is mostly PNA but this infection can lead to COPD exacerbation.   Sounded more wheezy today.   - Agree with solumedrol 40 mg IV once as day.   - taper to oral once she starts showing improvement.   - c/w LAMA / LABA/ ICS  - C/w Singulair.   - Former smoker.

## 2019-09-30 LAB
ANION GAP SERPL CALC-SCNC: 10 MMOL/L — SIGNIFICANT CHANGE UP (ref 5–17)
BASOPHILS # BLD AUTO: 0.01 K/UL — SIGNIFICANT CHANGE UP (ref 0–0.2)
BASOPHILS NFR BLD AUTO: 0.1 % — SIGNIFICANT CHANGE UP (ref 0–2)
BUN SERPL-MCNC: 13 MG/DL — SIGNIFICANT CHANGE UP (ref 7–23)
CALCIUM SERPL-MCNC: 9.4 MG/DL — SIGNIFICANT CHANGE UP (ref 8.4–10.5)
CHLORIDE SERPL-SCNC: 97 MMOL/L — SIGNIFICANT CHANGE UP (ref 96–108)
CO2 SERPL-SCNC: 29 MMOL/L — SIGNIFICANT CHANGE UP (ref 22–31)
CREAT SERPL-MCNC: 0.59 MG/DL — SIGNIFICANT CHANGE UP (ref 0.5–1.3)
EOSINOPHIL # BLD AUTO: 0 K/UL — SIGNIFICANT CHANGE UP (ref 0–0.5)
EOSINOPHIL NFR BLD AUTO: 0 % — SIGNIFICANT CHANGE UP (ref 0–6)
GLUCOSE SERPL-MCNC: 137 MG/DL — HIGH (ref 70–99)
HCT VFR BLD CALC: 37.8 % — SIGNIFICANT CHANGE UP (ref 34.5–45)
HGB BLD-MCNC: 12.3 G/DL — SIGNIFICANT CHANGE UP (ref 11.5–15.5)
IMM GRANULOCYTES NFR BLD AUTO: 0.8 % — SIGNIFICANT CHANGE UP (ref 0–1.5)
LYMPHOCYTES # BLD AUTO: 0.52 K/UL — LOW (ref 1–3.3)
LYMPHOCYTES # BLD AUTO: 6.2 % — LOW (ref 13–44)
MAGNESIUM SERPL-MCNC: 2.1 MG/DL — SIGNIFICANT CHANGE UP (ref 1.6–2.6)
MCHC RBC-ENTMCNC: 31.9 PG — SIGNIFICANT CHANGE UP (ref 27–34)
MCHC RBC-ENTMCNC: 32.5 GM/DL — SIGNIFICANT CHANGE UP (ref 32–36)
MCV RBC AUTO: 98.2 FL — SIGNIFICANT CHANGE UP (ref 80–100)
MONOCYTES # BLD AUTO: 0.32 K/UL — SIGNIFICANT CHANGE UP (ref 0–0.9)
MONOCYTES NFR BLD AUTO: 3.8 % — SIGNIFICANT CHANGE UP (ref 2–14)
NEUTROPHILS # BLD AUTO: 7.48 K/UL — HIGH (ref 1.8–7.4)
NEUTROPHILS NFR BLD AUTO: 89.1 % — HIGH (ref 43–77)
NRBC # BLD: 0 /100 WBCS — SIGNIFICANT CHANGE UP (ref 0–0)
PLATELET # BLD AUTO: 351 K/UL — SIGNIFICANT CHANGE UP (ref 150–400)
POTASSIUM SERPL-MCNC: 4.5 MMOL/L — SIGNIFICANT CHANGE UP (ref 3.5–5.3)
POTASSIUM SERPL-SCNC: 4.5 MMOL/L — SIGNIFICANT CHANGE UP (ref 3.5–5.3)
RBC # BLD: 3.85 M/UL — SIGNIFICANT CHANGE UP (ref 3.8–5.2)
RBC # FLD: 12.9 % — SIGNIFICANT CHANGE UP (ref 10.3–14.5)
SODIUM SERPL-SCNC: 136 MMOL/L — SIGNIFICANT CHANGE UP (ref 135–145)
WBC # BLD: 8.4 K/UL — SIGNIFICANT CHANGE UP (ref 3.8–10.5)
WBC # FLD AUTO: 8.4 K/UL — SIGNIFICANT CHANGE UP (ref 3.8–10.5)

## 2019-09-30 PROCEDURE — 71250 CT THORAX DX C-: CPT | Mod: 26

## 2019-09-30 PROCEDURE — 99233 SBSQ HOSP IP/OBS HIGH 50: CPT | Mod: GC

## 2019-09-30 RX ORDER — METOPROLOL TARTRATE 50 MG
5 TABLET ORAL ONCE
Refills: 0 | Status: COMPLETED | OUTPATIENT
Start: 2019-09-30 | End: 2019-09-30

## 2019-09-30 RX ORDER — AMLODIPINE BESYLATE 2.5 MG/1
5 TABLET ORAL DAILY
Refills: 0 | Status: DISCONTINUED | OUTPATIENT
Start: 2019-09-30 | End: 2019-10-03

## 2019-09-30 RX ADMIN — DABIGATRAN ETEXILATE MESYLATE 150 MILLIGRAM(S): 150 CAPSULE ORAL at 06:29

## 2019-09-30 RX ADMIN — Medication 0.25 MILLIGRAM(S): at 06:29

## 2019-09-30 RX ADMIN — Medication 3 MILLILITER(S): at 22:17

## 2019-09-30 RX ADMIN — Medication 3 MILLILITER(S): at 04:43

## 2019-09-30 RX ADMIN — Medication 3 MILLILITER(S): at 10:51

## 2019-09-30 RX ADMIN — Medication 5 MILLIGRAM(S): at 22:17

## 2019-09-30 RX ADMIN — DABIGATRAN ETEXILATE MESYLATE 150 MILLIGRAM(S): 150 CAPSULE ORAL at 18:13

## 2019-09-30 RX ADMIN — Medication 75 MICROGRAM(S): at 06:29

## 2019-09-30 RX ADMIN — Medication 60 MILLIGRAM(S): at 13:41

## 2019-09-30 RX ADMIN — Medication 3 MILLILITER(S): at 18:14

## 2019-09-30 RX ADMIN — Medication 3 MILLILITER(S): at 13:41

## 2019-09-30 RX ADMIN — MONTELUKAST 10 MILLIGRAM(S): 4 TABLET, CHEWABLE ORAL at 11:36

## 2019-09-30 RX ADMIN — AMLODIPINE BESYLATE 5 MILLIGRAM(S): 2.5 TABLET ORAL at 20:25

## 2019-09-30 RX ADMIN — GABAPENTIN 100 MILLIGRAM(S): 400 CAPSULE ORAL at 11:36

## 2019-09-30 NOTE — PROGRESS NOTE ADULT - PROBLEM SELECTOR PLAN 2
currently afebrile, sating 98% on 3LC NC with plan to titrate off as tolerated   -CXR not convincing for infiltrate although there is left sided haziness ?basilar infiltrate. RVP negative. Bcx negative to date   - Outpatient sputum cx resulted pansensitive pseudomonas aeruginosa  - Switched ceftraixone/azithromycin to levofloxacin 750 mg PO q24h x 5 days (last dose 10/3/19)  -f/u CT chest

## 2019-09-30 NOTE — PROGRESS NOTE ADULT - PROBLEM SELECTOR PLAN 3
Pulm consulted, continue to f/u recs   - Tiotropium respimat 1 puff daily  - Fluticasone-salmeterol 500-50 1 puff BID  - Singulair 10mg PO daily  - Duonebs q4h standing  - Hycodan for cough relief  - Solumedrol 60 mg IV q24h started 9/29/2019

## 2019-09-30 NOTE — PROGRESS NOTE ADULT - PROBLEM SELECTOR PLAN 2
Still think this is mostly PNA but this infection can lead to COPD exacerbation.   - consider transition to oral steroids once she starts showing improvement.   - c/w LAMA / LABA/ ICS  - C/w Singulair.   - Former smoker.

## 2019-09-30 NOTE — PROGRESS NOTE ADULT - ATTENDING COMMENTS
Known COPD in a former smoker with SOB and cough. Found to have Afib with RVR and left lower lobe pneumonia. Marginally improved so prednisone started. Sputum with Pseudomonas; antibiotics changed as per sensitivities. CT chest shows severe emphysema, bilateral bronchiectasis; lower lobe bronchovascular consolidations with peribronchial thickening and mucus plugging. Need AFB sputums. Seven days of antibiotics. Seven days of Prednisone. OOB. Pulmonary toilet.

## 2019-09-30 NOTE — SBIRT NOTE ADULT - NSSBIRTALCPOSREINDET_GEN_A_CORE
Patient reported drinking one glass of wine and one vodka tonic daily. Patient stated that alcohol has never been an issue for her.  offered patient resources but patient declined.

## 2019-10-01 LAB
ANION GAP SERPL CALC-SCNC: 10 MMOL/L — SIGNIFICANT CHANGE UP (ref 5–17)
BUN SERPL-MCNC: 15 MG/DL — SIGNIFICANT CHANGE UP (ref 7–23)
CALCIUM SERPL-MCNC: 10 MG/DL — SIGNIFICANT CHANGE UP (ref 8.4–10.5)
CHLORIDE SERPL-SCNC: 100 MMOL/L — SIGNIFICANT CHANGE UP (ref 96–108)
CO2 SERPL-SCNC: 29 MMOL/L — SIGNIFICANT CHANGE UP (ref 22–31)
CREAT SERPL-MCNC: 0.72 MG/DL — SIGNIFICANT CHANGE UP (ref 0.5–1.3)
DIGOXIN SERPL-MCNC: 1.3 NG/ML — SIGNIFICANT CHANGE UP (ref 0.8–2)
GLUCOSE SERPL-MCNC: 124 MG/DL — HIGH (ref 70–99)
HCT VFR BLD CALC: 40.5 % — SIGNIFICANT CHANGE UP (ref 34.5–45)
HGB BLD-MCNC: 13 G/DL — SIGNIFICANT CHANGE UP (ref 11.5–15.5)
MAGNESIUM SERPL-MCNC: 2.2 MG/DL — SIGNIFICANT CHANGE UP (ref 1.6–2.6)
MCHC RBC-ENTMCNC: 31.9 PG — SIGNIFICANT CHANGE UP (ref 27–34)
MCHC RBC-ENTMCNC: 32.1 GM/DL — SIGNIFICANT CHANGE UP (ref 32–36)
MCV RBC AUTO: 99.5 FL — SIGNIFICANT CHANGE UP (ref 80–100)
NRBC # BLD: 0 /100 WBCS — SIGNIFICANT CHANGE UP (ref 0–0)
PLATELET # BLD AUTO: 423 K/UL — HIGH (ref 150–400)
POTASSIUM SERPL-MCNC: 4.8 MMOL/L — SIGNIFICANT CHANGE UP (ref 3.5–5.3)
POTASSIUM SERPL-SCNC: 4.8 MMOL/L — SIGNIFICANT CHANGE UP (ref 3.5–5.3)
RBC # BLD: 4.07 M/UL — SIGNIFICANT CHANGE UP (ref 3.8–5.2)
RBC # FLD: 12.8 % — SIGNIFICANT CHANGE UP (ref 10.3–14.5)
SODIUM SERPL-SCNC: 139 MMOL/L — SIGNIFICANT CHANGE UP (ref 135–145)
WBC # BLD: 13.52 K/UL — HIGH (ref 3.8–10.5)
WBC # FLD AUTO: 13.52 K/UL — HIGH (ref 3.8–10.5)

## 2019-10-01 PROCEDURE — 99232 SBSQ HOSP IP/OBS MODERATE 35: CPT

## 2019-10-01 RX ADMIN — Medication 3 MILLILITER(S): at 18:06

## 2019-10-01 RX ADMIN — Medication 0.25 MILLIGRAM(S): at 06:05

## 2019-10-01 RX ADMIN — AMLODIPINE BESYLATE 5 MILLIGRAM(S): 2.5 TABLET ORAL at 06:05

## 2019-10-01 RX ADMIN — MONTELUKAST 10 MILLIGRAM(S): 4 TABLET, CHEWABLE ORAL at 11:15

## 2019-10-01 RX ADMIN — Medication 60 MILLIGRAM(S): at 14:13

## 2019-10-01 RX ADMIN — Medication 3 MILLILITER(S): at 11:14

## 2019-10-01 RX ADMIN — Medication 3 MILLILITER(S): at 06:05

## 2019-10-01 RX ADMIN — DABIGATRAN ETEXILATE MESYLATE 150 MILLIGRAM(S): 150 CAPSULE ORAL at 18:06

## 2019-10-01 RX ADMIN — Medication 3 MILLILITER(S): at 22:41

## 2019-10-01 RX ADMIN — GABAPENTIN 100 MILLIGRAM(S): 400 CAPSULE ORAL at 11:15

## 2019-10-01 RX ADMIN — Medication 75 MICROGRAM(S): at 06:05

## 2019-10-01 RX ADMIN — DABIGATRAN ETEXILATE MESYLATE 150 MILLIGRAM(S): 150 CAPSULE ORAL at 06:05

## 2019-10-01 RX ADMIN — Medication 3 MILLILITER(S): at 14:12

## 2019-10-01 NOTE — PROGRESS NOTE ADULT - PROBLEM SELECTOR PLAN 3
- Wean O2 to a goal of Spo2 = / >88%.  - please check ambulatory saturations tomorrow again with above said goal not 94%.

## 2019-10-01 NOTE — DIETITIAN INITIAL EVALUATION ADULT. - NUTRITIONGOAL OUTCOME1
Pt to consistently meet 75% of estimated needs PO Pt to consistently meet >75% of estimated needs via PO

## 2019-10-01 NOTE — DIETITIAN INITIAL EVALUATION ADULT. - FACTORS AFF FOOD INTAKE
persistent lack of appetite/Due to partial tongue paralysis, previous SOB, and finding eating monotonous Due to partial tongue paralysis, previous SOB, and finding eating monotonous/persistent lack of appetite/other (specify)

## 2019-10-01 NOTE — PROGRESS NOTE ADULT - PROBLEM SELECTOR PLAN 5
- Norvasc 5 mg daily    VTE ppx: on Pradaxa  Dispo: pending clinical improvement, PT consulted - Norvasc 5 mg daily    VTE ppx: on Pradaxa  Dispo: pending clinical improvement, PT consulted - recommending home PT

## 2019-10-01 NOTE — DIETITIAN INITIAL EVALUATION ADULT. - ENERGY NEEDS
ABW used for calculations as pt between % of IBW.   Height 67" ; ; ; 104%IBW; BMI 22.1  Nutrient needs based on [facility] standards of care for maintenance in older adults.   Fluid per team ABW used for calculations as pt between % of IBW.   Height 67" ; #; #; 104%IBW; BMI 22.1  Nutrient needs based on [facility] standards of care for maintenance in older adults, adjusted per COPD guidelines  Fluid per team

## 2019-10-01 NOTE — DIETITIAN INITIAL EVALUATION ADULT. - OTHER INFO
84F former heavy smoker with a PMHX of COPD of unknown severity, Afib (on Pradaxa/Digoxin), presented to [facility] ED 9/27/19 c/o worsening SOB and cough. Patient states she has been feeling fatigued and had poor PO intake due to worsening SOB. Patient currently stable,and here for mgmt of Afib with RVR in setting of CAP. Pt seen in room, resting in bed. Currently on a low NA diet and tolerating. Pt reported consuming <50% of her breakfast tray this AM, recalling some eggs and 1/2 a slice of toast. Has no appetite as finds eating "boring" and has partial tongue paralysis so eats slowly. Has adequate food at home 2/2 home  and her weight was stable 130-135# prior to admission. Pt recalls drinking Ensure chocolate each night at home after dinner to boost energy intake and expressed interest in getting Ensure here. No N/V/D with last BM on Saturday, although Pt expressed she feels next BM coming soon. Confirmed only food allergy as anaphylaxis to mustard. Pt takes mineral oil each night for regularity. Recommend adding in chocolate Ensure Enlive BID (700 kcal, 40g protein, 360 mL free H2O), keeping low sodium diet and consider soft diet if PO intake declines. RD to follow per protocol. 84F former heavy smoker with a PMHX of COPD of unknown severity, Afib (on Pradaxa/Digoxin), presented to [facility] ED 9/27/19 c/o worsening SOB and cough. Patient states she has been feeling fatigued and had poor PO intake due to worsening SOB/ COPD. Patient currently stable, and here for mgmt of Afib with RVR in setting of CAP. Pt seen in room, resting in bed. Currently on a low Na diet and tolerating. Pt reported consuming <50% of her breakfast tray this AM, recalling some eggs and 1/2 a slice of toast. Has no appetite as finds eating "boring" and has partial tongue paralysis therefore eats slowly. Has adequate food at home 2/2 home , weight noted to be stable at 130-135# prior to admission. Pt recalls drinking Ensure chocolate each night at home after dinner to boost energy intake and expressed interest in receiving here. No N/V/D with last BM on Saturday, although Pt expressed she feels next BM coming soon. Confirmed only food allergy as anaphylaxis to mustard. Pt takes mineral oil each night for bowel regularity. Recommend adding chocolate Ensure Enlive BID (700 kcal, 40g protein, 360 mL free H2O) to low sodium diet and consider downgrading to soft diet if PO intake remains poor. RD to follow per protocol. Encouraged intake through day to best meet needs

## 2019-10-01 NOTE — DIETITIAN INITIAL EVALUATION ADULT. - ADD RECOMMEND
1) Continue on low sodium diet   2) Add in Ensure Enlive BID (700 kcal, 40g protein, 360 mL free H2O)   3) Encourage intake throughout day 4) Monitor lytes 5) Consider changing diet consistency if PO intake continues <50% 1) Continue low sodium diet   2) Add Ensure Enlive BID (700 kcal, 40g protein, 360 mL free H2O)   3) Encourage intake throughout day 4) Monitor lytes 5) Consider changing diet consistency if PO intake continues <50%

## 2019-10-01 NOTE — DIETITIAN INITIAL EVALUATION ADULT. - MALNUTRITION
Pt does not exhibit signs of malnutrition at this time Pt does not exhibit signs of malnutrition at this time, appearing well nourished, wt stable +/- 3-5#

## 2019-10-01 NOTE — PHYSICAL THERAPY INITIAL EVALUATION ADULT - CRITERIA FOR SKILLED THERAPEUTIC INTERVENTIONS
anticipated discharge recommendation/rehab potential/therapy frequency/functional limitations in following categories/impairments found

## 2019-10-01 NOTE — PROGRESS NOTE ADULT - PROBLEM SELECTOR PLAN 2
Still think this is mostly PNA but this infection can lead to COPD exacerbation.   - consider transition to oral steroids once she starts showing improvement.   - c/w LAMA / LABA/ ICS  - Transition from solumedrol to prednisone PO 40 mg once a day for 5 days.   - C/w Singulair.   - Former smoker.

## 2019-10-01 NOTE — PHYSICAL THERAPY INITIAL EVALUATION ADULT - PERTINENT HX OF CURRENT PROBLEM, REHAB EVAL
84F  who presents to [facility] ED 9/27/19 c/o worsening SOB and cough x few days. Patient states she has been feeling fatigued and had poor PO intake due to worsening SOB. Patient states she at baseline is able to walk ~1/2 block, however has been unable to take a few steps without feeling winded. Patient states her cough has been keeping her up at night, unable to expectorate sputum.

## 2019-10-01 NOTE — PHYSICAL THERAPY INITIAL EVALUATION ADULT - GENERAL OBSERVATIONS, REHAB EVAL
Received standing in room +EKG, on RA 88% on room air, SOB, IV hep. Left in chair +O2 NC 2L 94%, RN Connie dow, call hoang

## 2019-10-01 NOTE — DIETITIAN INITIAL EVALUATION ADULT. - PROBLEM SELECTOR PLAN 2
currently afebrile, sating 98% on 4LC NC, WBC 22.8 with left shift. CXR revealed LLL infiltrate.  --Received Ceftriaxone 1g IV x 1 dose and Azithromycin 500mg PO x 1 dose in ED.  --will continue Ceftriaxone 1g IV q 24hrs and Azithromycin 250mg PO x 4 days.  --RVP negative/Influenza not detected, F/U blood cultures.

## 2019-10-02 DIAGNOSIS — J18.9 PNEUMONIA, UNSPECIFIED ORGANISM: ICD-10-CM

## 2019-10-02 LAB
NIGHT BLUE STAIN TISS: SIGNIFICANT CHANGE UP
SPECIMEN SOURCE: SIGNIFICANT CHANGE UP

## 2019-10-02 RX ORDER — DILTIAZEM HCL 120 MG
60 CAPSULE, EXT RELEASE 24 HR ORAL EVERY 12 HOURS
Refills: 0 | Status: DISCONTINUED | OUTPATIENT
Start: 2019-10-02 | End: 2019-10-03

## 2019-10-02 RX ADMIN — Medication 3 MILLILITER(S): at 14:09

## 2019-10-02 RX ADMIN — Medication 3 MILLILITER(S): at 17:50

## 2019-10-02 RX ADMIN — AMLODIPINE BESYLATE 5 MILLIGRAM(S): 2.5 TABLET ORAL at 06:17

## 2019-10-02 RX ADMIN — Medication 3 MILLILITER(S): at 21:38

## 2019-10-02 RX ADMIN — DABIGATRAN ETEXILATE MESYLATE 150 MILLIGRAM(S): 150 CAPSULE ORAL at 06:17

## 2019-10-02 RX ADMIN — Medication 3 MILLILITER(S): at 06:17

## 2019-10-02 RX ADMIN — Medication 3 MILLILITER(S): at 10:11

## 2019-10-02 RX ADMIN — GABAPENTIN 100 MILLIGRAM(S): 400 CAPSULE ORAL at 11:46

## 2019-10-02 RX ADMIN — MONTELUKAST 10 MILLIGRAM(S): 4 TABLET, CHEWABLE ORAL at 11:46

## 2019-10-02 RX ADMIN — DABIGATRAN ETEXILATE MESYLATE 150 MILLIGRAM(S): 150 CAPSULE ORAL at 17:50

## 2019-10-02 RX ADMIN — Medication 40 MILLIGRAM(S): at 11:52

## 2019-10-02 RX ADMIN — Medication 60 MILLIGRAM(S): at 12:23

## 2019-10-02 RX ADMIN — Medication 60 MILLIGRAM(S): at 17:50

## 2019-10-02 RX ADMIN — Medication 0.25 MILLIGRAM(S): at 06:17

## 2019-10-02 RX ADMIN — Medication 75 MICROGRAM(S): at 06:17

## 2019-10-02 NOTE — PROGRESS NOTE ADULT - PROBLEM SELECTOR PROBLEM 1
Pneumonia of left lower lobe due to infectious organism
Atrial fibrillation with RVR
Atrial fibrillation with RVR
Pneumonia of left lower lobe due to infectious organism
Pneumonia of left lower lobe due to infectious organism
Atrial fibrillation with RVR

## 2019-10-02 NOTE — PROGRESS NOTE ADULT - PROBLEM SELECTOR PLAN 4
-continue Levothyroxine 75mcg PO daily. TSH low normal. Pulm consulted, continue to f/u recs   - Tiotropium respimat 1 puff daily  - Fluticasone-salmeterol 500-50 1 puff BID  - Singulair 10mg PO daily  - Duonebs q4h standing  - Hycodan for cough relief  - transitioned from solumedrol to prednisone 40mg for 5 days  - goal spo02 >88

## 2019-10-02 NOTE — PROGRESS NOTE ADULT - PROBLEM SELECTOR PLAN 5
- Norvasc 5 mg daily    VTE ppx: on Pradaxa  Dispo: pending clinical improvement, PT consulted - recommending home PT -continue Levothyroxine 75mcg PO daily. TSH low normal.

## 2019-10-02 NOTE — PROGRESS NOTE ADULT - ASSESSMENT
84 yr old F former heavy smoker with a PMHX of COPD of unknown severity, Afib (on Pradaxa/Digoxin), who presents to [facility] ED 9/27/19 c/o worsening SOB and cough.
84 yr old F former heavy smoker with a PMHX of COPD (does not use home O2 but has at home), Afib (on Pradaxa/Digoxin), hypothyroidism who presents to [facility] ED 9/27/19 c/o worsening SOB and cough x few days, admitted for Afib with RVR in setting of possible PNA
Hemodynamically stable.  Over the last 24 hours she has improved markedly on steroids and nebs  -completed chest CT  =started levaquin for pseudomonas  -afebrile  =feels better    Follow up CT    AF - rate controlled on pradaxa for stroke prevention  HTN controlled on norvasc
I suspect in light of her presentation and preliminary sputum gram stain from my office (above) that this is a COPD exaccerabtion more than CAP though it is appropriate to cover her with abx in light of the severity of her COPD and presentation.  She has an out pateint pulmonaologist though if he is not available I will contact Dr. Veronica Soto to assist in her care  =atrial fibrillation with RVR on pradaxa  --rate is secondary to hyhpoxia and there is no indication for ischemia  -continue digosix  encourage OOB and physical therapy  -cont steroids and bax for now
84 yr old F former heavy smoker with a PMHX of COPD (does not use home O2 but has a tank at home), Afib (on Pradaxa/Digoxin), hypothyroidism who presents to [facility] ED 9/27/19 c/o worsening SOB and cough x few days, admitted for Afib with RVR in setting of PNA.
84 yr old F former heavy smoker with a PMHX of COPD of unknown severity, Afib (on Pradaxa/Digoxin), who presents to [facility] ED 9/27/19 c/o worsening SOB and cough.
84 yr old F former heavy smoker with a PMHX of COPD of unknown severity, Afib (on Pradaxa/Digoxin), who presents to [facility] ED 9/27/19 c/o worsening SOB and cough.
Hemodynamically stable overnight - ambulating with oxygen  Feeling better  Tachycardiac secondary to steroids  Remains in permanent AF  Afebrile    CT scan consistent with pneumonia and COPD  Pulm following for pseudomonas on levaquin  -will need to make decision about dc planning ?tomorrow  On pradaxa for cardioemobiolic stroke prevention  leukocytosis secondary to steroids
Hemodynamically stable with worsening SOB but afebrile  =resume steroids  =resume nebulizers  =echo reveiwed  =AF on pradaxa  =HTN start norvasc  =Bowel regimen  =cough suppressant for night as she did not sleep last night
Over prior 24 hours feels improved but still with tachycardia  Ambulating  CT chest = pulm now wants REGINA sputum  Extended course of levaquin    Monitor on prednisonw and levaquin po  await REGINA sputum  encourage OOB to ambulate  HTN now on amlodipine  AF with RVR on pradaxa for stroke preventiioin  -may add diltiazem for rate control start 60mg q12
84 yr old F former heavy smoker with a PMHX of COPD (does not use home O2 but has a tank at home), Afib (on Pradaxa/Digoxin), hypothyroidism who presents to [facility] ED 9/27/19 c/o worsening SOB and cough x few days, admitted for Afib with RVR in setting of PNA
84 yr old F former heavy smoker with a PMHX of COPD (does not use home O2 but has a tank at home), Afib (on Pradaxa/Digoxin), hypothyroidism who presents to [facility] ED 9/27/19 c/o worsening SOB and cough x few days, admitted for Afib with RVR in setting of PNA
84 yr old F former heavy smoker with a PMHX of COPD (does not use home O2 but has at home), Afib (on Pradaxa/Digoxin), hypothyroidism who presents to [facility] ED 9/27/19 c/o worsening SOB and cough x few days, admitted for Afib with RVR in setting of possible PNA

## 2019-10-02 NOTE — PROGRESS NOTE ADULT - PROBLEM SELECTOR PROBLEM 2
Pneumonia of left lower lobe due to infectious organism
COPD (chronic obstructive pulmonary disease)
Pneumonia of left lower lobe due to infectious organism

## 2019-10-02 NOTE — PROGRESS NOTE ADULT - PROBLEM SELECTOR PLAN 6
- Norvasc 5 mg daily    VTE ppx: on Pradaxa  Dispo: pending clinical improvement, PT consulted - recommending home PT

## 2019-10-02 NOTE — PROGRESS NOTE ADULT - PROBLEM SELECTOR PLAN 1
currently rate controlled in 90s, continue Digoxin 0.25mg PO daily and Pradaxa 150mg PO q 12hrs. Echo with left ventricular ejection fraction 69%. Mild symmetric left ventricular hypertrophy. PASP 42  - Digoxin 0.25 mg daily
Improved now. Sputum from Dr Mathias office showed pansensitive Pseudomonas. CXR shows retrocardiac opacity. CT chest done shows severe emphysema, bronchiectatic changes.  - C/w Levaquin x 10 days.   - Check Sputum for AFB to r/o REGINA in setting of Bronchiectasis.   - Cough syrup : mucolytics, try mucocyst nebs to help clear secretions, keep her hydrated.    - Trend fever and WBC curve.   - Incentive spirometry, OOB.   - Wean O2 to a goal of Spo2 >88%.  - please check ambulatory saturations
currently rate controlled in 90s, continue Digoxin 0.25mg PO daily and Pradaxa 150mg PO q 12hrs. Echo with left ventricular ejection fraction 69%. Mild symmetric left ventricular hypertrophy. PASP 42  -f/u am digoxin level
Afebrile, felt worse today, Coughing+. Sputum from Dr Mathias office showed pansensitive Pseudomonas. CXR shows retrocardiac opacity.   - Change abx to Levaquin  -  We recommend CT chest to r/o structural lung disease e.g bronchiectasis.   - Cough syrup : mucolytics, try mucocyst nebs to help clear secretions, keep her hydrated.    - Resend another sample here.   - Trend fever and WBC curve.   - Incentive spirometry, OOB.   - Wean O2 to a goal of Spo2 >88%.
Improved now. Sputum from Dr Mathias office showed pansensitive Pseudomonas. CXR shows retrocardiac opacity. CT chest done shows severe emphysema, bronchiectatic changes.  - C/w Levaquin 750 mg once a day for total of 2 weeks from the day of switch of Antibiotic.   - Check Sputum for AFB to r/o REGINA in setting of Bronchiectasis. induce with hypertonic saline 7%. Give bronchodilator ---> followed by Hypertonic saline to minimize bronchospasm.   - Cough syrup as needed.    - Incentive spirometry, OOB.   - Wean O2 to a goal of Spo2 >88%.  - Ambulatory saturations: needed 2 L NC with physical therapy but they might have aimed for 94%, goal is =/> 88%.
currently rate controlled in 90-100s, continue Digoxin 0.25mg PO daily and Pradaxa 150mg PO q 12hrs.   -Echo with left ventricular ejection fraction 69%. Mild symmetric left ventricular hypertrophy. PASP 42  - Digoxin 0.25 mg daily
currently rate controlled in 90-100s, continue Digoxin 0.25mg PO daily and Pradaxa 150mg PO q 12hrs.   -Echo with left ventricular ejection fraction 69%. Mild symmetric left ventricular hypertrophy. PASP 42  - Digoxin 0.25 mg daily
none
currently rate controlled in 90-100s, continue Digoxin 0.25mg PO daily and Pradaxa 150mg PO q 12hrs.   -Echo with left ventricular ejection fraction 69%. Mild symmetric left ventricular hypertrophy. PASP 42  - Digoxin 0.25 mg daily

## 2019-10-02 NOTE — PROGRESS NOTE ADULT - PROBLEM SELECTOR PLAN 3
Pulm consulted, continue to f/u recs   - Tiotropium respimat 1 puff daily  - Fluticasone-salmeterol 500-50 1 puff BID  - Singulair 10mg PO daily  - Duonebs q4h standing  - Hycodan for cough relief  - transitioned from solumedrol to prednisone 40mg for 5 days  - goal spo02 >88 wbc 22 and HR- 104 on admission meeting sirs criteria  plan as above

## 2019-10-02 NOTE — PROGRESS NOTE ADULT - PROBLEM SELECTOR PLAN 2
currently afebrile, sating 98% on 3LC NC with plan to titrate off as tolerated   -CXR not convincing for infiltrate although there is left sided haziness ?basilar infiltrate. RVP negative. Bcx negative to date   - Outpatient sputum cx resulted pansensitive pseudomonas aeruginosa  - c/w levofloxacin 750 mg PO q24h for 14 days (last dose 10/12/19)  - CT chest showing Multifocal foci of clustered nodules and emphysema.   - pulm following, appreciate recs currently afebrile, sating 98% on 3LC NC with plan to titrate off as tolerated   -CXR not convincing for infiltrate although there is left sided haziness ?basilar infiltrate. RVP negative. Bcx negative to date   - Outpatient sputum cx resulted pansensitive pseudomonas aeruginosa  - f/u sputum acid fast culture for REGINA   - c/w levofloxacin 750 mg PO q24h for 14 days (last dose 10/12/19)  - CT chest showing Multifocal foci of clustered nodules and emphysema.   - pulm following, appreciate recs

## 2019-10-03 ENCOUNTER — TRANSCRIPTION ENCOUNTER (OUTPATIENT)
Age: 84
End: 2019-10-03

## 2019-10-03 VITALS — TEMPERATURE: 97 F

## 2019-10-03 LAB
HCT VFR BLD CALC: 41 % — SIGNIFICANT CHANGE UP (ref 34.5–45)
HGB BLD-MCNC: 13.3 G/DL — SIGNIFICANT CHANGE UP (ref 11.5–15.5)
MCHC RBC-ENTMCNC: 32 PG — SIGNIFICANT CHANGE UP (ref 27–34)
MCHC RBC-ENTMCNC: 32.4 GM/DL — SIGNIFICANT CHANGE UP (ref 32–36)
MCV RBC AUTO: 98.6 FL — SIGNIFICANT CHANGE UP (ref 80–100)
NRBC # BLD: 0 /100 WBCS — SIGNIFICANT CHANGE UP (ref 0–0)
PLATELET # BLD AUTO: 455 K/UL — HIGH (ref 150–400)
RBC # BLD: 4.16 M/UL — SIGNIFICANT CHANGE UP (ref 3.8–5.2)
RBC # FLD: 13 % — SIGNIFICANT CHANGE UP (ref 10.3–14.5)
WBC # BLD: 14.9 K/UL — HIGH (ref 3.8–10.5)
WBC # FLD AUTO: 14.9 K/UL — HIGH (ref 3.8–10.5)

## 2019-10-03 RX ORDER — CALCIUM CARBONATE 500(1250)
1 TABLET ORAL ONCE
Refills: 0 | Status: COMPLETED | OUTPATIENT
Start: 2019-10-03 | End: 2019-10-03

## 2019-10-03 RX ORDER — ALBUTEROL 90 UG/1
2 AEROSOL, METERED ORAL
Qty: 0 | Refills: 0 | DISCHARGE

## 2019-10-03 RX ORDER — AMLODIPINE BESYLATE 2.5 MG/1
1 TABLET ORAL
Qty: 0 | Refills: 0 | DISCHARGE
Start: 2019-10-03

## 2019-10-03 RX ORDER — DILTIAZEM HCL 120 MG
1 CAPSULE, EXT RELEASE 24 HR ORAL
Qty: 60 | Refills: 0
Start: 2019-10-03 | End: 2019-11-01

## 2019-10-03 RX ORDER — DILTIAZEM HCL 120 MG
1 CAPSULE, EXT RELEASE 24 HR ORAL
Qty: 0 | Refills: 0 | DISCHARGE
Start: 2019-10-03

## 2019-10-03 RX ORDER — FLUTICASONE PROPIONATE AND SALMETEROL 50; 250 UG/1; UG/1
1 POWDER ORAL; RESPIRATORY (INHALATION)
Qty: 0 | Refills: 0 | DISCHARGE

## 2019-10-03 RX ORDER — AMLODIPINE BESYLATE 2.5 MG/1
1 TABLET ORAL
Qty: 30 | Refills: 0
Start: 2019-10-03 | End: 2019-11-01

## 2019-10-03 RX ADMIN — Medication 1 TABLET(S): at 06:23

## 2019-10-03 RX ADMIN — Medication 75 MICROGRAM(S): at 06:22

## 2019-10-03 RX ADMIN — Medication 0.25 MILLIGRAM(S): at 06:22

## 2019-10-03 RX ADMIN — Medication 3 MILLILITER(S): at 11:02

## 2019-10-03 RX ADMIN — Medication 40 MILLIGRAM(S): at 06:22

## 2019-10-03 RX ADMIN — MONTELUKAST 10 MILLIGRAM(S): 4 TABLET, CHEWABLE ORAL at 11:02

## 2019-10-03 RX ADMIN — Medication 60 MILLIGRAM(S): at 06:22

## 2019-10-03 RX ADMIN — Medication 3 MILLILITER(S): at 05:41

## 2019-10-03 RX ADMIN — DABIGATRAN ETEXILATE MESYLATE 150 MILLIGRAM(S): 150 CAPSULE ORAL at 06:22

## 2019-10-03 RX ADMIN — AMLODIPINE BESYLATE 5 MILLIGRAM(S): 2.5 TABLET ORAL at 06:22

## 2019-10-03 NOTE — DISCHARGE NOTE PROVIDER - CARE PROVIDER_API CALL
Maico Mathias)  Cardiovascular Medicine  [address] [place] [address]  Phone: [phone]  Fax: [phone]  Follow Up Time: Maico Mathias)  Cardiovascular Medicine  [address] [place] [address]  Phone: [phone]  Fax: [phone]  Follow Up Time:     Deborah Yates)  Critical Care Medicine; Pulmonary Disease  [address] [address]  Phone: [phone]  Fax: [phone]  Follow Up Time:

## 2019-10-03 NOTE — PROGRESS NOTE ADULT - SUBJECTIVE AND OBJECTIVE BOX
HPI:  84 yr old F former heavy smoker with a PMHX of COPD, Afib (on Pradaxa/Digoxin), hypothyroidism who presents to [facility] ED 9/27/19 c/o worsening SOB and cough x few days. Patient states she has been feeling fatigued and had poor PO intake due to worsening SOB. Patient states she at baseline is able to walk ~1/2 block, however has been unable to take a few steps without feeling winded. Patient states her cough has been keeping her up at night, unable to expectorate sputum. Patient denies any fever, chills, chest pain, palpitations, recent PND, orthopnea or LE edema.     In ED, BP: 113/68, HR:124, RR:22, Temp: 98.9F, O2 sat: 98%  on NRB. EKG revealed Afib with RVR@127BPM, no acute ST/T wave changes. Labs notable for: WBC 22.8 with left shift, lactate normal, BNP 1726. CXR with hyperinflated lung and LLL infiltrate. Blood cultures and influenza/RVP panel performed in ED.  Patient treated with Ceftriaxone 1g IV x 1 dose, Azithromycin 500mg PO x 1 dose, Magnesium sulfate 2g IV x 1 dose, Duoneb, and Lopressor 5mg IV x 1 dose.    Patient currently stable, admitted to [facility] for medical management of Afib with RVR in setting of PNA. (27 Sep 2019 21:30)    Sputum culture from my office:  many gram positive rods  moderate gram negative rods  few white blood cells (pmn's)  few epithelial cells    PAST MEDICAL & SURGICAL HISTORY:  Atrial fibrillation  Asthma  Bronchitis  S/P hip replacement, right: 2010    MEDICATIONS  (STANDING):  azithromycin   Tablet 250 milliGRAM(s) Oral daily  buDESOnide 160 MICROgram(s)/formoterol 4.5 MICROgram(s) Inhaler 2 Puff(s) Inhalation two times a day  cefTRIAXone   IVPB 1000 milliGRAM(s) IV Intermittent every 24 hours  dabigatran 150 milliGRAM(s) Oral every 12 hours  digoxin     Tablet 0.25 milliGRAM(s) Oral daily  gabapentin 100 milliGRAM(s) Oral daily  influenza   Vaccine 0.5 milliLiter(s) IntraMuscular once  levothyroxine 75 MICROGram(s) Oral daily  montelukast 10 milliGRAM(s) Oral daily  tiotropium 18 MICROgram(s) Capsule 1 Capsule(s) Inhalation daily    Vital Signs Last 24 Hrs  T(C): 37.1 (28 Sep 2019 01:08), Max: 37.2 (27 Sep 2019 17:39)  T(F): 98.7 (28 Sep 2019 01:08), Max: 98.9 (27 Sep 2019 17:39)  HR: 92 (27 Sep 2019 23:57) (82 - 129)  BP: 138/72 (27 Sep 2019 23:57) (113/68 - 199/98)  BP(mean): 89 (27 Sep 2019 21:30) (89 - 89)  RR: 16 (27 Sep 2019 23:57) (16 - 24)  SpO2: 94% (27 Sep 2019 21:36) (94% - 99%)    Coughing and visibly SOB  No JVD  Chest rhonchi and wheezing  CV Irr Irr  Abd soft  Ext no edema                          12.5   22.05 )-----------( 282      ( 28 Sep 2019 05:55 )             39.3   09-28    136  |  100  |  16  ----------------------------<  133<H>  4.8   |  25  |  0.74    Ca    9.3      28 Sep 2019 05:55  Mg     2.6     09-28    TPro  6.9  /  Alb  3.7  /  TBili  1.3<H>  /  DBili  x   /  AST  19  /  ALT  7<L>  /  AlkPhos  81  09-27    MEDICATIONS  (PRN):
HPI: 84 yr old F former heavy smoker with a PMHX of COPD, Afib (on Pradaxa/Digoxin), hypothyroidism who presents to [facility] ED 9/27/19 c/o worsening SOB and cough x few days. Patient states she has been feeling fatigued and had poor PO intake due to worsening SOB. Patient states she at baseline is able to walk ~1/2 block, however has been unable to take a few steps without feeling winded. Patient states her cough has been keeping her up at night, unable to expectorate sputum. Patient denies any fever, chills, chest pain, palpitations, recent PND, orthopnea or LE edema.  In ED, BP: 113/68, HR:124, RR:22, Temp: 98.9F, O2 sat: 98%  on NRB. EKG revealed Afib with RVR@127BPM, no acute ST/T wave changes. Labs notable for: WBC 22.8 with left shift, lactate normal, BNP 1726. CXR with hyperinflated lung and LLL infiltrate. Blood cultures and influenza/RVP panel performed in ED.  Patient treated with Ceftriaxone 1g IV x 1 dose, Azithromycin 500mg PO x 1 dose, Magnesium sulfate 2g IV x 1 dose, Duoneb, and Lopressor 5mg IV x 1 dose.    Patient currently stable, admitted to [facility] for medical management of Afib with RVR in setting of PNA. (27 Sep 2019 21:30)    Over the last 24 hours she has improved markedly on steroids and nebs  -completed chest CT  =started levaquin for pseudomonas  -afebrile  =feels better    PAST MEDICAL & SURGICAL HISTORY:  Atrial fibrillation  Asthma  Bronchitis  S/P hip replacement, right: 2010    MEDICATIONS  (STANDING):  ALBUTerol/ipratropium for Nebulization 3 milliLiter(s) Nebulizer every 4 hours  dabigatran 150 milliGRAM(s) Oral every 12 hours  digoxin     Tablet 0.25 milliGRAM(s) Oral daily  gabapentin 100 milliGRAM(s) Oral daily  influenza   Vaccine 0.5 milliLiter(s) IntraMuscular once  levoFLOXacin  Tablet 750 milliGRAM(s) Oral every 24 hours  levothyroxine 75 MICROGram(s) Oral daily  methylPREDNISolone sodium succinate Injectable 60 milliGRAM(s) IV Push every 24 hours  montelukast 10 milliGRAM(s) Oral daily  Tiotropium Respimat 2.5 MICROGram(s) 1 Inhalation Inhalation <User Schedule>  Wixela (Fluticason/Salmeterol 500-50 mcg 1 Puff(s) 1 Puff(s) Inhalation <User Schedule>    MEDICATIONS  (PRN):  HYDROcodone/homatropine Syrup 5 milliLiter(s) Oral every 6 hours PRN Cough    Vital Signs Last 24 Hrs  T(C): 36.7 (30 Sep 2019 09:14), Max: 36.7 (30 Sep 2019 09:14)  T(F): 98.1 (30 Sep 2019 09:14), Max: 98.1 (30 Sep 2019 09:14)  HR: 91 (30 Sep 2019 09:26) (91 - 145)  BP: 134/81 (30 Sep 2019 09:26) (134/81 - 170/100)  BP(mean): --  RR: 17 (30 Sep 2019 09:26) (16 - 18)  SpO2: 96% (30 Sep 2019 09:26) (94% - 97%)    NAD  No JVD  Chest end wxp wheeze with improved air movement  CV IRR IRR no murmur  Abd soft  Ext no edema                          12.3   8.40  )-----------( 351      ( 30 Sep 2019 06:08 )             37.8   09-30    136  |  97  |  13  ----------------------------<  137<H>  4.5   |  29  |  0.59    Ca    9.4      30 Sep 2019 06:08  Mg     2.1     09-30
INTERVAL HPI/OVERNIGHT EVENTS: No events overnight.     SUBJECTIVE: Patient seen and examined at bedside, w/o complaints     OBJECTIVE:    VITAL SIGNS:  T(F): 97.4 (28 Sep 2019 13:50), Max: 98.9 (27 Sep 2019 17:39)  HR: 96 (28 Sep 2019 11:46) (82 - 129)  BP: 141/83 (28 Sep 2019 11:46) (113/68 - 199/98)  RR: 18 (28 Sep 2019 11:46) (16 - 24)  SpO2: 95% (28 Sep 2019 11:46) (94% - 99%)      09-28 @ 07:01  -  09-28 @ 14:04  --------------------------------------------------------  IN: 534 mL / OUT: 0 mL / NET: 534 mL      PHYSICAL EXAM:    General: NAD, comfortable, sitting up in bed NC in place (3L)  HEENT: NCAT, PERRL, clear conjunctiva, no scleral icterus  Neck: supple, no JVD  Respiratory: CTA b/l, no wheezing, rhonchi, rales  Cardiovascular: tachycardic   Abdomen: soft, NT/ND, bowel sounds in all four quadrants, no palpable masses  Extremities: WWP, no clubbing, cyanosis, or edema  Neuro: AAOx3, follows commands, nonfocal exam     MEDICATIONS:  MEDICATIONS  (STANDING):  azithromycin   Tablet 250 milliGRAM(s) Oral daily  buDESOnide 160 MICROgram(s)/formoterol 4.5 MICROgram(s) Inhaler 2 Puff(s) Inhalation two times a day  cefTRIAXone   IVPB 1000 milliGRAM(s) IV Intermittent every 24 hours  dabigatran 150 milliGRAM(s) Oral every 12 hours  digoxin     Tablet 0.25 milliGRAM(s) Oral daily  gabapentin 100 milliGRAM(s) Oral daily  influenza   Vaccine 0.5 milliLiter(s) IntraMuscular once  levothyroxine 75 MICROGram(s) Oral daily  montelukast 10 milliGRAM(s) Oral daily  tiotropium 18 MICROgram(s) Capsule 1 Capsule(s) Inhalation daily    MEDICATIONS  (PRN):      ALLERGIES:  Allergies    Mustard (Anaphylaxis)  No Known Drug Allergies    Intolerances        LABS:                        12.5   22.05 )-----------( 282      ( 28 Sep 2019 05:55 )             39.3     09-28    136  |  100  |  16  ----------------------------<  133<H>  4.8   |  25  |  0.74    Ca    9.3      28 Sep 2019 05:55  Mg     2.6     09-28    TPro  6.9  /  Alb  3.7  /  TBili  1.3<H>  /  DBili  x   /  AST  19  /  ALT  7<L>  /  AlkPhos  81  09-27    PT/INR - ( 27 Sep 2019 18:18 )   PT: 16.7 sec;   INR: 1.46          PTT - ( 27 Sep 2019 18:18 )  PTT:46.4 sec      RADIOLOGY & ADDITIONAL TESTS: b/l pulm vascular congestion
HPI:  84 yr old F former heavy smoker with a PMHX of COPD, Afib (on Pradaxa/Digoxin), hypothyroidism who presents to [facility] ED 9/27/19 c/o worsening SOB and cough x few days. Patient states she has been feeling fatigued and had poor PO intake due to worsening SOB. Patient states she at baseline is able to walk ~1/2 block, however has been unable to take a few steps without feeling winded. Patient states her cough has been keeping her up at night, unable to expectorate sputum. Patient denies any fever, chills, chest pain, palpitations, recent PND, orthopnea or LE edema.  In ED, BP: 113/68, HR:124, RR:22, Temp: 98.9F, O2 sat: 98%  on NRB. EKG revealed Afib with RVR@127BPM, no acute ST/T wave changes. Labs notable for: WBC 22.8 with left shift, lactate normal, BNP 1726. CXR with hyperinflated lung and LLL infiltrate. Blood cultures and influenza/RVP panel performed in ED.  Patient treated with Ceftriaxone 1g IV x 1 dose, Azithromycin 500mg PO x 1 dose, Magnesium sulfate 2g IV x 1 dose, Duoneb, and Lopressor 5mg IV x 1 dose.    Patient currently stable, admitted to [facility] for medical management of Afib with RVR in setting of PNA. (27 Sep 2019 21:30)    Over prior 24 hours feels improved but still with tachycardia  Ambulating  CT chest = pulm now wants REGINA sputum  Extended course of levaquin    PAST MEDICAL & SURGICAL HISTORY:  Atrial fibrillation  Asthma  Bronchitis  S/P hip replacement, right: 2010    MEDICATIONS  (STANDING):  ALBUTerol/ipratropium for Nebulization 3 milliLiter(s) Nebulizer every 4 hours  amLODIPine   Tablet 5 milliGRAM(s) Oral daily  dabigatran 150 milliGRAM(s) Oral every 12 hours  digoxin     Tablet 0.25 milliGRAM(s) Oral daily  gabapentin 100 milliGRAM(s) Oral daily  influenza   Vaccine 0.5 milliLiter(s) IntraMuscular once  levoFLOXacin  Tablet 750 milliGRAM(s) Oral every 24 hours  levothyroxine 75 MICROGram(s) Oral daily  montelukast 10 milliGRAM(s) Oral daily  predniSONE   Tablet 40 milliGRAM(s) Oral daily  Tiotropium Respimat 2.5 MICROGram(s) 1 Inhalation Inhalation <User Schedule>  Wixela (Fluticason/Salmeterol 500-50 mcg 1 Puff(s) 1 Puff(s) Inhalation <User Schedule>    MEDICATIONS  (PRN):  HYDROcodone/homatropine Syrup 5 milliLiter(s) Oral every 6 hours PRN Cough    Vital Signs Last 24 Hrs  T(C): 36.7 (02 Oct 2019 05:58), Max: 36.9 (01 Oct 2019 21:34)  T(F): 98 (02 Oct 2019 05:58), Max: 98.4 (01 Oct 2019 21:34)  HR: 104 (02 Oct 2019 05:41) (91 - 120)  BP: 179/101 (02 Oct 2019 05:41) (137/85 - 193/103)  BP(mean): --  RR: 16 (02 Oct 2019 05:41) (16 - 18)  SpO2: 93% (02 Oct 2019 05:41) (91% - 95%)    NAD seated in bed  No JVD  Chest decreased bs no wheeze  CV IrrIrr tachycardic no murmur  Abd soft  Ext no edema                          13.0   13.52 )-----------( 423      ( 01 Oct 2019 06:30 )             40.5   10-01    139  |  100  |  15  ----------------------------<  124<H>  4.8   |  29  |  0.72    Ca    10.0      01 Oct 2019 06:30  Mg     2.2     10-01
Hemodynamically stable overnight - ambulating with oxygen  Feeling better  Tachycardiac secondary to steroids  Remains in permanent AF  Afebrile    PAST MEDICAL & SURGICAL HISTORY:  Atrial fibrillation  Asthma  Bronchitis  S/P hip replacement, right: 2010    MEDICATIONS  (STANDING):  ALBUTerol/ipratropium for Nebulization 3 milliLiter(s) Nebulizer every 4 hours  amLODIPine   Tablet 5 milliGRAM(s) Oral daily  dabigatran 150 milliGRAM(s) Oral every 12 hours  digoxin     Tablet 0.25 milliGRAM(s) Oral daily  gabapentin 100 milliGRAM(s) Oral daily  influenza   Vaccine 0.5 milliLiter(s) IntraMuscular once  levoFLOXacin  Tablet 750 milliGRAM(s) Oral every 24 hours  levothyroxine 75 MICROGram(s) Oral daily  methylPREDNISolone sodium succinate Injectable 60 milliGRAM(s) IV Push every 24 hours  montelukast 10 milliGRAM(s) Oral daily  Tiotropium Respimat 2.5 MICROGram(s) 1 Inhalation Inhalation <User Schedule>  Wixela (Fluticason/Salmeterol 500-50 mcg 1 Puff(s) 1 Puff(s) Inhalation <User Schedule>    MEDICATIONS  (PRN):  HYDROcodone/homatropine Syrup 5 milliLiter(s) Oral every 6 hours PRN Cough    Vital Signs Last 24 Hrs  T(C): 36.2 (01 Oct 2019 08:43), Max: 37.1 (30 Sep 2019 13:46)  T(F): 97.1 (01 Oct 2019 08:43), Max: 98.7 (30 Sep 2019 13:46)  HR: 107 (01 Oct 2019 05:41) (91 - 119)  BP: 139/98 (01 Oct 2019 05:41) (134/81 - 188/104)  BP(mean): --  RR: 16 (01 Oct 2019 05:41) (16 - 18)  SpO2: 93% (01 Oct 2019 05:41) (92% - 96%)    Mild respiratory [id number]  nop JVD  Chest decreased bs throughout no wheeze  CV Irr Irr  Abd soft  Ext no edema                          13.0   13.52 )-----------( 423      ( 01 Oct 2019 06:30 )             40.5   10-01    139  |  100  |  15  ----------------------------<  124<H>  4.8   |  29  |  0.72    Ca    10.0      01 Oct 2019 06:30  Mg     2.2     10-01    CT Scan Impression:   1.  Multifocal foci of clustered nodules in both lungs, slightly   increased in extent, likely infectious/inflammatory such as   bronchopneumonia. Bronchitis and mucous plugging in both lungs,   pronounced in both lower lobes.  2.  Bilateral small pleural effusion.  3.  Severe emphysema.  4.  Stable enlarged main pulmonary artery measuring up to 4.0 cm and   stable dilated ascending aorta measuring up to 4.0 cm.
Hemodynamically stable though no w more SOB today  No fever     PAST MEDICAL & SURGICAL HISTORY:  Atrial fibrillation  Asthma  Bronchitis  S/P hip replacement, right: 2010    MEDICATIONS  (STANDING):  ALBUTerol/ipratropium for Nebulization 3 milliLiter(s) Nebulizer every 4 hours  azithromycin   Tablet 250 milliGRAM(s) Oral daily  cefTRIAXone   IVPB 1000 milliGRAM(s) IV Intermittent every 24 hours  dabigatran 150 milliGRAM(s) Oral every 12 hours  digoxin     Tablet 0.25 milliGRAM(s) Oral daily  gabapentin 100 milliGRAM(s) Oral daily  influenza   Vaccine 0.5 milliLiter(s) IntraMuscular once  levothyroxine 75 MICROGram(s) Oral daily  methylPREDNISolone sodium succinate Injectable 60 milliGRAM(s) IV Push every 24 hours  montelukast 10 milliGRAM(s) Oral daily  Wixela (Fluticason/Salmeterol 500-50 mcg 1 Puff(s) 1 Puff(s) Inhalation <User Schedule>    MEDICATIONS  (PRN):  HYDROcodone/homatropine Syrup 5 milliLiter(s) Oral every 6 hours PRN Cough    Vital Signs Last 24 Hrs  T(C): 36.6 (29 Sep 2019 13:43), Max: 36.6 (29 Sep 2019 00:43)  T(F): 97.8 (29 Sep 2019 13:43), Max: 97.9 (29 Sep 2019 00:43)  HR: 86 (29 Sep 2019 10:30) (86 - 100)  BP: 180/74 (29 Sep 2019 10:30) (151/85 - 192/104)  BP(mean): --  RR: 18 (29 Sep 2019 10:30) (18 - 18)  SpO2: 96% (29 Sep 2019 10:30) (96% - 98%)  NAD  No JVD  Chest poor BS and end exp wheeze  CV IRR IRR  Abd soft  Ext no edema                          12.0   18.00 )-----------( 283      ( 29 Sep 2019 06:02 )             38.3   09-29    135  |  101  |  13  ----------------------------<  91  4.7   |  26  |  0.58    Ca    9.4      29 Sep 2019 06:02  Mg     2.2     09-29    TPro  6.9  /  Alb  3.7  /  TBili  1.3<H>  /  DBili  x   /  AST  19  /  ALT  7<L>  /  AlkPhos  81  09-27    Echocardiogram CONCLUSIONS:     1. There is mild symmetric left ventricular hypertrophy. There are no   regional wall motion abnormalities seen. Left ventricular ejection   fraction is 69.0 %.   2. Normal right ventricular size and systolic function.   3. No significant valvular disease.   4. There is mild pulmonary hypertension.   5. The ascending aorta appears mildly dilated with respect to aortic   root size measuring 3.9 cm in diameter. The aortic arch appears normal.   6. No pericardial effusion.
Interventional Cardiology PA Adult Progress Note    Subjective Assessment: Pt seen and examined at bedside this am. Pt reports improvement in breathing.    ROS negative except for subjective and HPI  	  MEDICATIONS:  digoxin     Tablet 0.25 milliGRAM(s) Oral daily    levoFLOXacin  Tablet 750 milliGRAM(s) Oral every 24 hours    ALBUTerol/ipratropium for Nebulization 3 milliLiter(s) Nebulizer every 4 hours  HYDROcodone/homatropine Syrup 5 milliLiter(s) Oral every 6 hours PRN  montelukast 10 milliGRAM(s) Oral daily    gabapentin 100 milliGRAM(s) Oral daily      levothyroxine 75 MICROGram(s) Oral daily  methylPREDNISolone sodium succinate Injectable 60 milliGRAM(s) IV Push every 24 hours    dabigatran 150 milliGRAM(s) Oral every 12 hours  influenza   Vaccine 0.5 milliLiter(s) IntraMuscular once      	    [PHYSICAL EXAM:  TELEMETRY:  T(C): 37.1 (09-30-19 @ 13:46), Max: 37.1 (09-30-19 @ 13:46)  HR: 91 (09-30-19 @ 09:26) (91 - 145)  BP: 134/81 (09-30-19 @ 09:26) (134/81 - 170/100)  RR: 17 (09-30-19 @ 09:26) (16 - 18)  SpO2: 96% (09-30-19 @ 09:26) (94% - 96%)  Wt(kg): --  I&O's Summary    29 Sep 2019 07:01  -  30 Sep 2019 07:00  --------------------------------------------------------  IN: 730 mL / OUT: 0 mL / NET: 730 mL    30 Sep 2019 07:01  -  30 Sep 2019 15:33  --------------------------------------------------------  IN: 180 mL / OUT: 0 mL / NET: 180 mL        Castro:  Central/PICC/Mid Line:                                         Appearance: Normal	  HEENT:   Normal oral mucosa, PERRL, EOMI	  Neck: Supple, - JVD  Cardiovascular: irregular,S1 S2, No JVD, No murmurs  Respiratory: Lungs clear to auscultation, No Rales, Rhonchi, Wheezing	  Gastrointestinal:  Soft, Non-tender, + BS	  Skin: No rashes, No ecchymoses, No cyanosis  Extremities: Normal range of motion, No clubbing, cyanosis or edema  Vascular: Peripheral pulses palpable 2+ bilaterally  Neurologic: Non-focal  Psychiatry: A & O x 3, Mood & affect appropriate      	    ECG:  	  RADIOLOGY:   DIAGNOSTIC TESTING:  [ ] Echocardiogram:  [ ]  Catheterization:  [ ] Stress Test:    [ ] JOHNIE  OTHER: 	    LABS:	 	  CARDIAC MARKERS:                                  12.3   8.40  )-----------( 351      ( 30 Sep 2019 06:08 )             37.8     09-30    136  |  97  |  13  ----------------------------<  137<H>  4.5   |  29  |  0.59    Ca    9.4      30 Sep 2019 06:08  Mg     2.1     09-30      proBNP:   Lipid Profile:   HgA1c:   TSH:       ASSESSMENT/PLAN: 	        DVT ppx:  Dispo:
OVERNIGHT EVENTS:  JASSI  SUBJECTIVE / INTERVAL HPI: Patient seen and examined at bedside.   Patient laying comfortably in bed, awake and alert with no active complaints overnight. Reports some mild palpiations and shortness of breath though improved. Patient denies h/n/v/d, fever, chills, cp, abd pain, leg swelling, rashes, dysuria, and changes in BM.     VITAL SIGNS:  Vital Signs Last 24 Hrs  T(C): 36.8 (02 Oct 2019 09:34), Max: 36.9 (01 Oct 2019 21:34)  T(F): 98.2 (02 Oct 2019 09:34), Max: 98.4 (01 Oct 2019 21:34)  HR: 116 (02 Oct 2019 09:03) (91 - 120)  BP: 170/93 (02 Oct 2019 09:03) (137/85 - 193/103)  BP(mean): --  RR: 17 (02 Oct 2019 09:03) (16 - 18)  SpO2: 91% (02 Oct 2019 09:03) (91% - 95%)    PHYSICAL EXAM:    Constitutional: Elderly female, NAD, comfortable in bed.  HEENT: NC/AT, PERRLA, EOMI, no conjunctival pallor or scleral icterus, MMM  Neck: Supple, no JVD  Respiratory: Normal rate, rhythm, depth, effort. CTAB. No w/r/r.   Cardiovascular: irregular, tachycardic, normal S1 and S2, no m/r/g.   Gastrointestinal: +BS, soft NTND, no guarding or rebound tenderness, no palpable masses   Extremities: wwp; no cyanosis, clubbing or edema.   Vascular: Pulses equal and strong throughout.   Neurological: AAOx3, no CN deficits, strength and sensation intact throughout.   Skin: No gross skin abnormalities or rashes    MEDICATIONS:  MEDICATIONS  (STANDING):  ALBUTerol/ipratropium for Nebulization 3 milliLiter(s) Nebulizer every 4 hours  amLODIPine   Tablet 5 milliGRAM(s) Oral daily  dabigatran 150 milliGRAM(s) Oral every 12 hours  digoxin     Tablet 0.25 milliGRAM(s) Oral daily  diltiazem    Tablet 60 milliGRAM(s) Oral every 12 hours  gabapentin 100 milliGRAM(s) Oral daily  influenza   Vaccine 0.5 milliLiter(s) IntraMuscular once  levoFLOXacin  Tablet 750 milliGRAM(s) Oral every 24 hours  levothyroxine 75 MICROGram(s) Oral daily  montelukast 10 milliGRAM(s) Oral daily  predniSONE   Tablet 40 milliGRAM(s) Oral daily  Tiotropium Respimat 2.5 MICROGram(s) 1 Inhalation Inhalation <User Schedule>  Wixela (Fluticason/Salmeterol 500-50 mcg 1 Puff(s) 1 Puff(s) Inhalation <User Schedule>    MEDICATIONS  (PRN):  HYDROcodone/homatropine Syrup 5 milliLiter(s) Oral every 6 hours PRN Cough      ALLERGIES:  Allergies    Mustard (Anaphylaxis)  No Known Drug Allergies    Intolerances        LABS:                        13.0   13.52 )-----------( 423      ( 01 Oct 2019 06:30 )             40.5     10-01    139  |  100  |  15  ----------------------------<  124<H>  4.8   |  29  |  0.72    Ca    10.0      01 Oct 2019 06:30  Mg     2.2     10-01    CAPILLARY BLOOD GLUCOSE  RADIOLOGY & ADDITIONAL TESTS: Reviewed.
OVERNIGHT EVENTS: Required norvasc and lopressor overnight for high blood pressures    SUBJECTIVE: Pt seen and examined at bedside. She is feeling improved, is coughing less without any productivity, is still requiring oxygen while ambulating but denies fevers, chills, chest pain, abdominal pain, n/v/d/c.    Vital Signs Last 12 Hrs  T(F): 97.1 (10-01-19 @ 08:43), Max: 98.4 (10-01-19 @ 06:18)  HR: 107 (10-01-19 @ 05:41) (107 - 110)  BP: 139/98 (10-01-19 @ 05:41) (139/98 - 179/107)  BP(mean): --  RR: 16 (10-01-19 @ 05:41) (16 - 16)  SpO2: 93% (10-01-19 @ 05:41) (93% - 95%)  I&O's Summary    30 Sep 2019 07:01  -  01 Oct 2019 07:00  --------------------------------------------------------  IN: 360 mL / OUT: 0 mL / NET: 360 mL    01 Oct 2019 07:01  -  01 Oct 2019 09:16  --------------------------------------------------------  IN: 180 mL / OUT: 0 mL / NET: 180 mL        PHYSICAL EXAM:  Constitutional: Elderly female, NAD, comfortable in bed.  HEENT: NC/AT, PERRLA, EOMI, no conjunctival pallor or scleral icterus, MMM  Neck: Supple, no JVD  Respiratory: Normal rate, rhythm, depth, effort. CTAB. No w/r/r.   Cardiovascular: irregular, tachycardic, normal S1 and S2, no m/r/g.   Gastrointestinal: +BS, soft NTND, no guarding or rebound tenderness, no palpable masses   Extremities: wwp; no cyanosis, clubbing or edema.   Vascular: Pulses equal and strong throughout.   Neurological: AAOx3, no CN deficits, strength and sensation intact throughout.   Skin: No gross skin abnormalities or rashes        LABS:                        13.0   13.52 )-----------( 423      ( 01 Oct 2019 06:30 )             40.5     10-01    139  |  100  |  15  ----------------------------<  124<H>  4.8   |  29  |  0.72    Ca    10.0      01 Oct 2019 06:30  Mg     2.2     10-01            RADIOLOGY & ADDITIONAL TESTS:      Culture - Blood (collected 09-27-19 @ 19:34)  Source: .Blood Blood-Peripheral  Preliminary Report (09-30-19 @ 20:00):    No growth at 3 days.    Culture - Blood (collected 09-27-19 @ 19:34)  Source: .Blood Blood-Peripheral  Preliminary Report (09-30-19 @ 20:00):    No growth at 3 days.        MEDICATIONS  (STANDING):  ALBUTerol/ipratropium for Nebulization 3 milliLiter(s) Nebulizer every 4 hours  amLODIPine   Tablet 5 milliGRAM(s) Oral daily  dabigatran 150 milliGRAM(s) Oral every 12 hours  digoxin     Tablet 0.25 milliGRAM(s) Oral daily  gabapentin 100 milliGRAM(s) Oral daily  influenza   Vaccine 0.5 milliLiter(s) IntraMuscular once  levoFLOXacin  Tablet 750 milliGRAM(s) Oral every 24 hours  levothyroxine 75 MICROGram(s) Oral daily  methylPREDNISolone sodium succinate Injectable 60 milliGRAM(s) IV Push every 24 hours  montelukast 10 milliGRAM(s) Oral daily  Tiotropium Respimat 2.5 MICROGram(s) 1 Inhalation Inhalation <User Schedule>  Wixela (Fluticason/Salmeterol 500-50 mcg 1 Puff(s) 1 Puff(s) Inhalation <User Schedule>    MEDICATIONS  (PRN):  HYDROcodone/homatropine Syrup 5 milliLiter(s) Oral every 6 hours PRN Cough
PROGRESS NOTE    INCOMPLETE    CC:  Overnight Events:  Interval History:   ROS:    OBJECTIVE  Vitals:  T(C): 36.1 (10-03-19 @ 06:12), Max: 37.2 (10-02-19 @ 22:54)  HR: 101 (10-03-19 @ 06:59) (86 - 116)  BP: 167/91 (10-03-19 @ 06:59) (132/76 - 186/89)  RR: 18 (10-03-19 @ 06:59) (16 - 18)  SpO2: 93% (10-03-19 @ 06:59) (90% - 93%)  Wt(kg): --    I/O:  I&O's Summary    02 Oct 2019 07:01  -  03 Oct 2019 07:00  --------------------------------------------------------  IN: 180 mL / OUT: 0 mL / NET: 180 mL        PHYSICAL EXAM:  Appearance: NAD. Speaking in full sentences.   HEENT: PERRL. No pallor noted.  Conjunctiva clear b/l. Moist oral mucosa.  Cardiovascular: RRR with no murmurs.  Respiratory: Lungs CTAB.   Gastrointestinal:  Soft, nontender. Non-distended. Non-rigid.	  Extremities: No edema b/l. No erythema b/l. LE WWP b/l.  Vascular: DP present.  Neurologic:  Alert and awake. Moving all extremities. Following commands. Making eye contact.  	  LABS:                        13.3   14.90 )-----------( 455      ( 03 Oct 2019 07:13 )             41.0                 RADIOLOGY & ADDITIONAL TESTS:  Reviewed .    MEDICATIONS  (STANDING):  ALBUTerol/ipratropium for Nebulization 3 milliLiter(s) Nebulizer every 4 hours  amLODIPine   Tablet 5 milliGRAM(s) Oral daily  dabigatran 150 milliGRAM(s) Oral every 12 hours  digoxin     Tablet 0.25 milliGRAM(s) Oral daily  diltiazem    Tablet 60 milliGRAM(s) Oral every 12 hours  gabapentin 100 milliGRAM(s) Oral daily  influenza   Vaccine 0.5 milliLiter(s) IntraMuscular once  levoFLOXacin  Tablet 750 milliGRAM(s) Oral every 24 hours  levothyroxine 75 MICROGram(s) Oral daily  montelukast 10 milliGRAM(s) Oral daily  predniSONE   Tablet 40 milliGRAM(s) Oral daily  Tiotropium Respimat 2.5 MICROGram(s) 1 Inhalation Inhalation <User Schedule>  Wixela (Fluticason/Salmeterol 500-50 mcg 1 Puff(s) 1 Puff(s) Inhalation <User Schedule>    MEDICATIONS  (PRN):  HYDROcodone/homatropine Syrup 5 milliLiter(s) Oral every 6 hours PRN Cough      ASSESSMENT:    PLAN:
PULMONARY CONSULT FOLLOW-UP NOTE    INTERVAL HISTORY:   Reviewed chart and overnight events; patient seen and examined at bedside.    MEDICATIONS:  Pulmonary:  ALBUTerol/ipratropium for Nebulization 3 milliLiter(s) Nebulizer every 4 hours  HYDROcodone/homatropine Syrup 5 milliLiter(s) Oral every 6 hours PRN  montelukast 10 milliGRAM(s) Oral daily    Antimicrobials:  levoFLOXacin  Tablet 750 milliGRAM(s) Oral every 24 hours    Anticoagulants:  dabigatran 150 milliGRAM(s) Oral every 12 hours    Cardiac:  digoxin     Tablet 0.25 milliGRAM(s) Oral daily      Allergies    Mustard (Anaphylaxis)  No Known Drug Allergies    Intolerances        Vital Signs Last 24 Hrs  T(C): 36.6 (29 Sep 2019 18:28), Max: 36.6 (29 Sep 2019 00:43)  T(F): 97.9 (29 Sep 2019 18:28), Max: 97.9 (29 Sep 2019 00:43)  HR: 91 (29 Sep 2019 19:23) (86 - 98)  BP: 170/100 (29 Sep 2019 19:23) (151/85 - 192/104)  BP(mean): --  RR: 18 (29 Sep 2019 19:23) (18 - 18)  SpO2: 96% (29 Sep 2019 19:23) (96% - 98%)    09-28 @ 07:01 - 09-29 @ 07:00  --------------------------------------------------------  IN: 534 mL / OUT: 0 mL / NET: 534 mL    09-29 @ 07:01 - 09-29 @ 20:35  --------------------------------------------------------  IN: 600 mL / OUT: 0 mL / NET: 600 mL          PHYSICAL EXAM:  Constitutional: WDWN  HEENT: NC/AT; PERRL, anicteric sclera; MMM  Neck: supple  Cardiovascular: +S1/S2, RRR  Respiratory: CTA B/L; no W/R/R  Gastrointestinal: soft, NT/ND; +BSx4  Extremities: WWP; no edema, clubbing or cyanosis  Vascular: 2+ radial, DP/PT pulses B/L  Neurological: AAOx3; no focal deficits    LABS:      CBC Full  -  ( 29 Sep 2019 06:02 )  WBC Count : 18.00 K/uL  RBC Count : 3.74 M/uL  Hemoglobin : 12.0 g/dL  Hematocrit : 38.3 %  Platelet Count - Automated : 283 K/uL  Mean Cell Volume : 102.4 fl  Mean Cell Hemoglobin : 32.1 pg  Mean Cell Hemoglobin Concentration : 31.3 gm/dL  Auto Neutrophil # : x  Auto Lymphocyte # : x  Auto Monocyte # : x  Auto Eosinophil # : x  Auto Basophil # : x  Auto Neutrophil % : x  Auto Lymphocyte % : x  Auto Monocyte % : x  Auto Eosinophil % : x  Auto Basophil % : x    09-29    135  |  101  |  13  ----------------------------<  91  4.7   |  26  |  0.58    Ca    9.4      29 Sep 2019 06:02  Mg     2.2     09-29
PULMONARY CONSULT FOLLOW-UP NOTE    INTERVAL HISTORY:   Reviewed chart and overnight events; patient seen and examined at bedside.  Improved SOB  cough minimal  sitting in chair now.       MEDICATIONS:  Pulmonary:  ALBUTerol/ipratropium for Nebulization 3 milliLiter(s) Nebulizer every 4 hours  HYDROcodone/homatropine Syrup 5 milliLiter(s) Oral every 6 hours PRN  montelukast 10 milliGRAM(s) Oral daily    Antimicrobials:  levoFLOXacin  Tablet 750 milliGRAM(s) Oral every 24 hours    Anticoagulants:  dabigatran 150 milliGRAM(s) Oral every 12 hours    Cardiac:  amLODIPine   Tablet 5 milliGRAM(s) Oral daily  digoxin     Tablet 0.25 milliGRAM(s) Oral daily      Allergies    Mustard (Anaphylaxis)  No Known Drug Allergies    Intolerances        Vital Signs Last 24 Hrs  T(C): 36.7 (01 Oct 2019 18:01), Max: 36.9 (30 Sep 2019 19:15)  T(F): 98.1 (01 Oct 2019 18:01), Max: 98.4 (30 Sep 2019 19:15)  HR: 104 (01 Oct 2019 18:10) (91 - 120)  BP: 193/103 (01 Oct 2019 18:10) (125/83 - 193/103)  BP(mean): --  RR: 17 (01 Oct 2019 18:10) (16 - 18)  SpO2: 92% (01 Oct 2019 18:10) (91% - 95%)    09-30 @ 07:01  -  10-01 @ 07:00  --------------------------------------------------------  IN: 360 mL / OUT: 0 mL / NET: 360 mL    10-01 @ 07:01  -  10-01 @ 19:14  --------------------------------------------------------  IN: 360 mL / OUT: 0 mL / NET: 360 mL          PHYSICAL EXAM:  Constitutional: WDWN  HEENT: NC/AT; PERRL, anicteric sclera; MMM  Neck: supple  Cardiovascular: +S1/S2, RRR  Respiratory: prolonged expiratory wheezing, improved now.    Gastrointestinal: soft, NT/ND; +BSx4  Extremities: WWP; no edema, clubbing or cyanosis  Vascular: 2+ radial, DP/PT pulses B/L  Neurological: AAOx3; no focal deficits    LABS:      CBC Full  -  ( 01 Oct 2019 06:30 )  WBC Count : 13.52 K/uL  RBC Count : 4.07 M/uL  Hemoglobin : 13.0 g/dL  Hematocrit : 40.5 %  Platelet Count - Automated : 423 K/uL  Mean Cell Volume : 99.5 fl  Mean Cell Hemoglobin : 31.9 pg  Mean Cell Hemoglobin Concentration : 32.1 gm/dL  Auto Neutrophil # : x  Auto Lymphocyte # : x  Auto Monocyte # : x  Auto Eosinophil # : x  Auto Basophil # : x  Auto Neutrophil % : x  Auto Lymphocyte % : x  Auto Monocyte % : x  Auto Eosinophil % : x  Auto Basophil % : x    10-01    139  |  100  |  15  ----------------------------<  124<H>  4.8   |  29  |  0.72    Ca    10.0      01 Oct 2019 06:30  Mg     2.2     10-01
PULMONARY CONSULT FOLLOW-UP NOTE    INTERVAL HISTORY:   Reviewed chart and overnight events; patient seen and examined at bedside.  improved SOB and cough  slept well  Afebrile      MEDICATIONS:  Pulmonary:  ALBUTerol/ipratropium for Nebulization 3 milliLiter(s) Nebulizer every 4 hours  HYDROcodone/homatropine Syrup 5 milliLiter(s) Oral every 6 hours PRN  montelukast 10 milliGRAM(s) Oral daily    Antimicrobials:  levoFLOXacin  Tablet 750 milliGRAM(s) Oral every 24 hours    Anticoagulants:  dabigatran 150 milliGRAM(s) Oral every 12 hours    Cardiac:  digoxin     Tablet 0.25 milliGRAM(s) Oral daily      Allergies    Mustard (Anaphylaxis)  No Known Drug Allergies    Intolerances    Vital Signs Last 24 Hrs  T(C): 37.1 (30 Sep 2019 13:46), Max: 37.1 (30 Sep 2019 13:46)  T(F): 98.7 (30 Sep 2019 13:46), Max: 98.7 (30 Sep 2019 13:46)  HR: 103 (30 Sep 2019 14:05) (91 - 145)  BP: 156/81 (30 Sep 2019 14:05) (134/81 - 170/100)  BP(mean): --  RR: 18 (30 Sep 2019 14:05) (16 - 18)  SpO2: 92% (30 Sep 2019 14:05) (92% - 96%)    09-29 @ 07:01  -  09-30 @ 07:00  --------------------------------------------------------  IN: 730 mL / OUT: 0 mL / NET: 730 mL    09-30 @ 07:01  -  09-30 @ 18:21  --------------------------------------------------------  IN: 180 mL / OUT: 0 mL / NET: 180 mL      PHYSICAL EXAM:  Constitutional: WDWN  HEENT: NC/AT; PERRL, anicteric sclera; MMM  Neck: supple  Cardiovascular: +S1/S2, RRR  Respiratory: still faint but wheezing improved.   CVS: afib 110bmp, S1 s2 present.   Extremities: WWP; no edema, clubbing or cyanosis  Vascular: 2+ radial, DP/PT pulses B/L  Neurological: AAOx3; no focal deficits    LABS:  CBC Full  -  ( 30 Sep 2019 06:08 )  WBC Count : 8.40 K/uL  RBC Count : 3.85 M/uL  Hemoglobin : 12.3 g/dL  Hematocrit : 37.8 %  Platelet Count - Automated : 351 K/uL  Mean Cell Volume : 98.2 fl  Mean Cell Hemoglobin : 31.9 pg  Mean Cell Hemoglobin Concentration : 32.5 gm/dL  Auto Neutrophil # : 7.48 K/uL  Auto Lymphocyte # : 0.52 K/uL  Auto Monocyte # : 0.32 K/uL  Auto Eosinophil # : 0.00 K/uL  Auto Basophil # : 0.01 K/uL  Auto Neutrophil % : 89.1 %  Auto Lymphocyte % : 6.2 %  Auto Monocyte % : 3.8 %  Auto Eosinophil % : 0.0 %  Auto Basophil % : 0.1 %    09-30    136  |  97  |  13  ----------------------------<  137<H>  4.5   |  29  |  0.59    Ca    9.4      30 Sep 2019 06:08  Mg     2.1     09-30      RADIOLOGY & ADDITIONAL STUDIES:    CT chest reviewed.
INTERVAL HPI/OVERNIGHT EVENTS:  Patient was seen and examined at bedside. JASSI o/n, patient w/o active complaints. Denies f/c/n/v abd pain, CP, SOB    VITAL SIGNS:  T(F): 97.3 (09-29-19 @ 09:18)  HR: 86 (09-29-19 @ 10:30)  BP: 180/74 (09-29-19 @ 10:30)  RR: 18 (09-29-19 @ 10:30)  SpO2: 96% (09-29-19 @ 10:30)  Wt(kg): --    PHYSICAL EXAM:    Constitutional: WDWN, NAD  HEENT: PERRL, EOMI, sclera non-icteric, MMM  Respiratory: CTA b/l, no w/r/r  Cardiovascular: RRR, normal S1S2, no M/R/G  Gastrointestinal: soft, NTND, no masses palpable, BS normal x4  Extremities: Warm, well perfused, pulses equal bilateral upper and lower extremities, no edema, no clubbing  Neurological: AAOx3, CN Grossly intact  Skin: Normal temperature, warm, dry    MEDICATIONS  (STANDING):  azithromycin   Tablet 250 milliGRAM(s) Oral daily  buDESOnide 160 MICROgram(s)/formoterol 4.5 MICROgram(s) Inhaler 2 Puff(s) Inhalation two times a day  cefTRIAXone   IVPB 1000 milliGRAM(s) IV Intermittent every 24 hours  dabigatran 150 milliGRAM(s) Oral every 12 hours  digoxin     Tablet 0.25 milliGRAM(s) Oral daily  gabapentin 100 milliGRAM(s) Oral daily  influenza   Vaccine 0.5 milliLiter(s) IntraMuscular once  levothyroxine 75 MICROGram(s) Oral daily  montelukast 10 milliGRAM(s) Oral daily  tiotropium 18 MICROgram(s) Capsule 1 Capsule(s) Inhalation daily    MEDICATIONS  (PRN):  ALBUTerol/ipratropium for Nebulization 3 milliLiter(s) Nebulizer every 6 hours PRN Shortness of Breath and/or Wheezing      Allergies    Mustard (Anaphylaxis)  No Known Drug Allergies    Intolerances        LABS:                        12.0   18.00 )-----------( 283      ( 29 Sep 2019 06:02 )             38.3     09-29    135  |  101  |  13  ----------------------------<  91  4.7   |  26  |  0.58    Ca    9.4      29 Sep 2019 06:02  Mg     2.2     09-29    TPro  6.9  /  Alb  3.7  /  TBili  1.3<H>  /  DBili  x   /  AST  19  /  ALT  7<L>  /  AlkPhos  81  09-27    PT/INR - ( 27 Sep 2019 18:18 )   PT: 16.7 sec;   INR: 1.46          PTT - ( 27 Sep 2019 18:18 )  PTT:46.4 sec      RADIOLOGY & ADDITIONAL TESTS:  No new studies

## 2019-10-03 NOTE — DISCHARGE NOTE NURSING/CASE MANAGEMENT/SOCIAL WORK - PATIENT PORTAL LINK FT
You can access the FollowMyHealth Patient Portal offered by [facility] by registering at the following website: [email or web address]. By joining [facility]’s FollowMyHealth portal, you will also be able to view your health information using other applications (apps) compatible with our system.

## 2019-10-03 NOTE — DISCHARGE NOTE PROVIDER - NSDCCPCAREPLAN_GEN_ALL_CORE_FT
PRINCIPAL DISCHARGE DIAGNOSIS  Diagnosis: Atrial fibrillation with RVR  Assessment and Plan of Treatment:       SECONDARY DISCHARGE DIAGNOSES  Diagnosis: Sepsis due to pneumonia  Assessment and Plan of Treatment: Sepsis due to pneumonia PRINCIPAL DISCHARGE DIAGNOSIS  Diagnosis: Atrial fibrillation with RVR  Assessment and Plan of Treatment: You were found to have a rapid heart rate with an irregular rhythm called atrial fibrillation. You were started on an additional medication called cardizem. Please continue this medication along with your pradaxa and digoxin.      SECONDARY DISCHARGE DIAGNOSES  Diagnosis: Sepsis due to pneumonia  Assessment and Plan of Treatment: You were found to have a pneumonia for which your cultures were taken as an outpatient found to be growing a bacteria for which you were switched to an antibiotic that covered this bacteria. Please continue to take levaquin for an additional 9 days (to complete a 2 week course).    Diagnosis: COPD (chronic obstructive pulmonary disease)  Assessment and Plan of Treatment: You were started on steroids for your lungs and seen by the pulmonary specialists. You will complete a 5 day taper for which you have an additional 3 days left. Please follow up with your pulmonologist (information has been provided for Dr. Yates- if you decide to change specialists).

## 2019-10-03 NOTE — PROGRESS NOTE ADULT - REASON FOR ADMISSION
Afib with RVR in setting of CAP

## 2019-10-03 NOTE — PROGRESS NOTE ADULT - PROVIDER SPECIALTY LIST ADULT
Cardiology
Intervent Cardiology
Pulmonology
Pulmonology
Cardiology
Pulmonology
Cardiology

## 2019-10-03 NOTE — DISCHARGE NOTE PROVIDER - HOSPITAL COURSE
84F former heavy smoker with a PMHX of COPD (does not use home O2 but has a tank at home), Afib (on Pradaxa/Digoxin), hypothyroidism who presented to [facility] ED 9/27/19 c/o worsening SOB and cough x few days, admitted for Afib with RVR in setting of PNA. Patient was noted to have pan-sensitive pseudomonas growing on sputum cultures performed outpatient. Patient was followed by pulmonary and started on prednisone taper and levaquin for Pseudomonas coverage of CAP. 84F former heavy smoker with a PMHX of COPD (does not use home O2 but has a tank at home), Afib (on Pradaxa/Digoxin), hypothyroidism who presented to [facility] ED 9/27/19 c/o worsening SOB and cough x few days, admitted for Afib with RVR in setting of PNA. Patient was noted to have pan-sensitive pseudomonas growing on sputum cultures performed outpatient. Patient was followed by pulmonary and started on prednisone taper and levaquin for Pseudomonas coverage of CAP. Patient was rate controlled with cardizem and digoxin. 84F former heavy smoker with a PMHX of COPD (does not use home O2 but has a tank at home), Afib (on Pradaxa/Digoxin), hypothyroidism who presented to [facility] ED 9/27/19 c/o worsening SOB and cough x few days, admitted for Afib with RVR in setting of PNA. Patient was noted to have pan-sensitive pseudomonas growing on sputum cultures performed outpatient. Patient was followed by pulmonary and started on prednisone taper and levaquin for Pseudomonas coverage of CAP. Patient was rate controlled with cardizem and digoxin. Patient was noted to have some desaturations to 86-87% on RA but has previously been set up with home o2 for which patient instructed to use while walking. Patient will be discharged home with home PT and follow up with pulmonology (patient expressed interest in following up with Dr. Yates for which information has been provided).

## 2019-10-03 NOTE — DISCHARGE NOTE PROVIDER - CARE PROVIDERS DIRECT ADDRESSES
[email or web address]

## 2019-10-03 NOTE — DISCHARGE NOTE PROVIDER - PROVIDER TOKENS
PROVIDER:[TOKEN:[[id number]]] PROVIDER:[TOKEN:[[id number]]],PROVIDER:[TOKEN:[[id number]]]

## 2019-10-04 LAB — DIGITOXIN SERPL-MCNC: SIGNIFICANT CHANGE UP NG/ML

## 2019-10-07 DIAGNOSIS — A41.9 SEPSIS, UNSPECIFIED ORGANISM: ICD-10-CM

## 2019-10-07 DIAGNOSIS — E03.9 HYPOTHYROIDISM, UNSPECIFIED: ICD-10-CM

## 2019-10-07 DIAGNOSIS — I48.91 UNSPECIFIED ATRIAL FIBRILLATION: ICD-10-CM

## 2019-10-07 DIAGNOSIS — J44.0 CHRONIC OBSTRUCTIVE PULMONARY DISEASE WITH (ACUTE) LOWER RESPIRATORY INFECTION: ICD-10-CM

## 2019-10-07 DIAGNOSIS — J96.90 RESPIRATORY FAILURE, UNSPECIFIED, UNSPECIFIED WHETHER WITH HYPOXIA OR HYPERCAPNIA: ICD-10-CM

## 2019-10-07 DIAGNOSIS — Z96.641 PRESENCE OF RIGHT ARTIFICIAL HIP JOINT: ICD-10-CM

## 2019-10-07 DIAGNOSIS — Z87.891 PERSONAL HISTORY OF NICOTINE DEPENDENCE: ICD-10-CM

## 2019-10-07 DIAGNOSIS — J15.9 UNSPECIFIED BACTERIAL PNEUMONIA: ICD-10-CM

## 2019-10-07 DIAGNOSIS — J47.9 BRONCHIECTASIS, UNCOMPLICATED: ICD-10-CM

## 2019-10-07 DIAGNOSIS — I10 ESSENTIAL (PRIMARY) HYPERTENSION: ICD-10-CM

## 2019-10-07 DIAGNOSIS — R06.02 SHORTNESS OF BREATH: ICD-10-CM

## 2019-10-25 ENCOUNTER — APPOINTMENT (OUTPATIENT)
Dept: PULMONOLOGY | Facility: CLINIC | Age: 84
End: 2019-10-25
Payer: MEDICARE

## 2019-10-25 VITALS
TEMPERATURE: 97.6 F | DIASTOLIC BLOOD PRESSURE: 66 MMHG | WEIGHT: 131 LBS | HEART RATE: 90 BPM | OXYGEN SATURATION: 92 % | SYSTOLIC BLOOD PRESSURE: 110 MMHG | BODY MASS INDEX: 20.56 KG/M2 | HEIGHT: 67 IN

## 2019-10-25 PROCEDURE — 90686 IIV4 VACC NO PRSV 0.5 ML IM: CPT

## 2019-10-25 PROCEDURE — 99214 OFFICE O/P EST MOD 30 MIN: CPT | Mod: 25

## 2019-10-25 PROCEDURE — G0008: CPT

## 2019-10-25 NOTE — REASON FOR VISIT
[Follow-Up - From Hospitalization] : a hospitalization follow-up [Bronchiectasis] : bronchiectasis [COPD] : COPD

## 2019-10-25 NOTE — ASSESSMENT
[FreeTextEntry1] : Pt recovering well from past pneumonia, suspected to have been pseudomonal. Asked pt to return in 1 month for f/u as she still has no rales and decreased BSs at right base. Will get PFts at that time as well. For now agree with triple therapy, nebs and acapella.

## 2019-10-25 NOTE — HISTORY OF PRESENT ILLNESS
[FreeTextEntry1] : 84F with h/o emphysema/copd on triple therapy and mild cylindrical bronchiectasis, was hospitalized last month for pneumonia which grew pseudomonas. She reports feeling almost back to baseline today. No chronic cough but she reports in past growing lots on bugs in her sputum. No chronic sputum production but uses her nebs and acapella daily and religiously. ET limited, she gets winded easily. Had PFT this year with her pulmonologist.

## 2019-10-25 NOTE — PHYSICAL EXAM
[General Appearance - Well Developed] : well developed [General Appearance - Well Nourished] : well nourished [Normal Conjunctiva] : the conjunctiva exhibited no abnormalities [Normal Oropharynx] : normal oropharynx [Neck Appearance] : the appearance of the neck was normal [Heart Rate And Rhythm] : heart rate and rhythm were normal [Heart Sounds] : normal S1 and S2 [Arterial Pulses Normal] : the arterial pulses were normal [Respiration, Rhythm And Depth] : normal respiratory rhythm and effort [FreeTextEntry1] : diminished BSs at right base and crackles heard [Bowel Sounds] : normal bowel sounds [Abdomen Soft] : soft [Abdomen Tenderness] : non-tender [Abnormal Walk] : normal gait [Nail Clubbing] : no clubbing of the fingernails [Cyanosis, Localized] : no localized cyanosis [Skin Color & Pigmentation] : normal skin color and pigmentation [Skin Turgor] : normal skin turgor [] : no rash [Oriented To Time, Place, And Person] : oriented to person, place, and time [Impaired Insight] : insight and judgment were intact [Affect] : the affect was normal

## 2019-10-25 NOTE — REVIEW OF SYSTEMS
[Cough] : cough [Sputum] : not coughing up ~M sputum [Dyspnea] : dyspnea [Chest Tightness] : chest tightness [Negative] : Endocrine

## 2019-11-01 ENCOUNTER — MED ADMIN CHARGE (OUTPATIENT)
Age: 84
End: 2019-11-01

## 2019-11-21 LAB
CULTURE RESULTS: SIGNIFICANT CHANGE UP
SPECIMEN SOURCE: SIGNIFICANT CHANGE UP

## 2019-11-26 ENCOUNTER — APPOINTMENT (OUTPATIENT)
Dept: PULMONOLOGY | Facility: CLINIC | Age: 84
End: 2019-11-26
Payer: MEDICARE

## 2019-11-26 VITALS
HEART RATE: 93 BPM | WEIGHT: 132 LBS | SYSTOLIC BLOOD PRESSURE: 130 MMHG | TEMPERATURE: 97.5 F | OXYGEN SATURATION: 94 % | RESPIRATION RATE: 12 BRPM | DIASTOLIC BLOOD PRESSURE: 90 MMHG | HEIGHT: 67 IN | BODY MASS INDEX: 20.72 KG/M2

## 2019-11-26 PROCEDURE — 99214 OFFICE O/P EST MOD 30 MIN: CPT

## 2019-11-26 NOTE — HISTORY OF PRESENT ILLNESS
[FreeTextEntry1] : Pt here c/o feeling a little fatigued like something is coming on, a little more cough and mild change in color with yellow. Hears rattling in the chest sometimes at home. Has been feeling like this about a week. No fevers or chills.\par \par 84F with h/o emphysema/copd on triple therapy and mild cylindrical bronchiectasis, was hospitalized last month for pneumonia which grew pseudomonas. She reports feeling almost back to baseline today. No chronic cough but she reports in past growing lots on bugs in her sputum. No chronic sputum production but uses her nebs and acapella daily and religiously. ET limited, she gets winded easily. Had PFT this year with her pulmonologist.

## 2019-11-26 NOTE — REVIEW OF SYSTEMS
[Cough] : cough [Dyspnea] : dyspnea [Chest Tightness] : chest tightness [Negative] : Endocrine [Sputum] : not coughing up ~M sputum

## 2019-11-26 NOTE — ASSESSMENT
[FreeTextEntry1] : Lungs sound clear, not bringing up any phlegm today but i gave her a cup in case. CXR pending. Sent abx only if she gets worse over the weekend.For now agree with triple therapy, nebs, saline and acapella.

## 2019-11-26 NOTE — PHYSICAL EXAM
[General Appearance - Well Developed] : well developed [Normal Conjunctiva] : the conjunctiva exhibited no abnormalities [General Appearance - Well Nourished] : well nourished [Normal Oropharynx] : normal oropharynx [Neck Appearance] : the appearance of the neck was normal [Heart Rate And Rhythm] : heart rate and rhythm were normal [Heart Sounds] : normal S1 and S2 [Arterial Pulses Normal] : the arterial pulses were normal [Respiration, Rhythm And Depth] : normal respiratory rhythm and effort [Bowel Sounds] : normal bowel sounds [Abdomen Soft] : soft [Abdomen Tenderness] : non-tender [Abnormal Walk] : normal gait [Nail Clubbing] : no clubbing of the fingernails [Cyanosis, Localized] : no localized cyanosis [Skin Color & Pigmentation] : normal skin color and pigmentation [Skin Turgor] : normal skin turgor [] : no rash [Oriented To Time, Place, And Person] : oriented to person, place, and time [Impaired Insight] : insight and judgment were intact [Affect] : the affect was normal [FreeTextEntry1] : diminished BSs at right base and crackles heard

## 2019-12-03 ENCOUNTER — RESULT REVIEW (OUTPATIENT)
Age: 84
End: 2019-12-03

## 2019-12-03 LAB — BACTERIA SPT CULT: ABNORMAL

## 2019-12-13 ENCOUNTER — APPOINTMENT (OUTPATIENT)
Dept: PULMONOLOGY | Facility: CLINIC | Age: 84
End: 2019-12-13
Payer: MEDICARE

## 2019-12-13 VITALS
DIASTOLIC BLOOD PRESSURE: 90 MMHG | HEIGHT: 67 IN | SYSTOLIC BLOOD PRESSURE: 120 MMHG | RESPIRATION RATE: 12 BRPM | WEIGHT: 132 LBS | OXYGEN SATURATION: 93 % | TEMPERATURE: 98 F | HEART RATE: 114 BPM | BODY MASS INDEX: 20.72 KG/M2

## 2019-12-13 PROCEDURE — 99214 OFFICE O/P EST MOD 30 MIN: CPT

## 2019-12-16 NOTE — REASON FOR VISIT
[Follow-Up - From Hospitalization] : a hospitalization follow-up [COPD] : COPD [Bronchiectasis] : bronchiectasis

## 2019-12-16 NOTE — HISTORY OF PRESENT ILLNESS
[FreeTextEntry1] : Pt here c/o feeling lousy and fatigued, voluminous phlegm yellow to green and RAE. Hears rattling in the chest sometimes at home. Has been feeling like this over 2 weeks. No fevers or chills.\par \par 84F with h/o emphysema/copd on triple therapy and mild cylindrical bronchiectasis, was hospitalized last month for pneumonia which grew pseudomonas. She reports feeling almost back to baseline today. No chronic cough but she reports in past growing lots on bugs in her sputum. No chronic sputum production but uses her nebs and acapella daily and religiously. ET limited, she gets winded easily. Had PFT this year with her pulmonologist.

## 2019-12-16 NOTE — REVIEW OF SYSTEMS
[Cough] : cough [Dyspnea] : dyspnea [Sputum] : not coughing up ~M sputum [Chest Tightness] : chest tightness [Negative] : Endocrine

## 2019-12-16 NOTE — PHYSICAL EXAM
[General Appearance - Well Developed] : well developed [General Appearance - Well Nourished] : well nourished [Normal Conjunctiva] : the conjunctiva exhibited no abnormalities [Normal Oropharynx] : normal oropharynx [Heart Rate And Rhythm] : heart rate and rhythm were normal [Neck Appearance] : the appearance of the neck was normal [Heart Sounds] : normal S1 and S2 [Arterial Pulses Normal] : the arterial pulses were normal [Respiration, Rhythm And Depth] : normal respiratory rhythm and effort [Bowel Sounds] : normal bowel sounds [FreeTextEntry1] : diminished BSs at right base and crackles heard [Abdomen Soft] : soft [Abdomen Tenderness] : non-tender [Nail Clubbing] : no clubbing of the fingernails [Abnormal Walk] : normal gait [Cyanosis, Localized] : no localized cyanosis [Skin Color & Pigmentation] : normal skin color and pigmentation [Skin Turgor] : normal skin turgor [] : no rash [Affect] : the affect was normal [Impaired Insight] : insight and judgment were intact [Oriented To Time, Place, And Person] : oriented to person, place, and time

## 2019-12-27 ENCOUNTER — OTHER (OUTPATIENT)
Age: 84
End: 2019-12-27

## 2019-12-27 RX ORDER — ALBUTEROL SULFATE 2.5 MG/3ML
(2.5 MG/3ML) SOLUTION RESPIRATORY (INHALATION)
Qty: 2 | Refills: 11 | Status: ACTIVE | COMMUNITY
Start: 2019-12-27 | End: 1900-01-01

## 2020-01-10 ENCOUNTER — OTHER (OUTPATIENT)
Age: 85
End: 2020-01-10

## 2020-02-21 ENCOUNTER — APPOINTMENT (OUTPATIENT)
Dept: PULMONOLOGY | Facility: CLINIC | Age: 85
End: 2020-02-21
Payer: MEDICARE

## 2020-02-21 VITALS
HEIGHT: 67 IN | WEIGHT: 131 LBS | RESPIRATION RATE: 12 BRPM | BODY MASS INDEX: 20.56 KG/M2 | SYSTOLIC BLOOD PRESSURE: 120 MMHG | TEMPERATURE: 97.7 F | OXYGEN SATURATION: 93 % | DIASTOLIC BLOOD PRESSURE: 90 MMHG | HEART RATE: 92 BPM

## 2020-02-21 PROCEDURE — 99214 OFFICE O/P EST MOD 30 MIN: CPT

## 2020-02-24 LAB — BACTERIA SPT CULT: NORMAL

## 2020-02-28 ENCOUNTER — APPOINTMENT (OUTPATIENT)
Dept: PULMONOLOGY | Facility: CLINIC | Age: 85
End: 2020-02-28
Payer: MEDICARE

## 2020-02-28 VITALS
WEIGHT: 131 LBS | SYSTOLIC BLOOD PRESSURE: 120 MMHG | HEART RATE: 86 BPM | BODY MASS INDEX: 20.56 KG/M2 | TEMPERATURE: 97.5 F | DIASTOLIC BLOOD PRESSURE: 70 MMHG | HEIGHT: 67 IN | RESPIRATION RATE: 12 BRPM | OXYGEN SATURATION: 93 %

## 2020-02-28 PROCEDURE — 99214 OFFICE O/P EST MOD 30 MIN: CPT

## 2020-02-28 NOTE — PHYSICAL EXAM
[General Appearance - Well Developed] : well developed [General Appearance - Well Nourished] : well nourished [Normal Oropharynx] : normal oropharynx [Normal Conjunctiva] : the conjunctiva exhibited no abnormalities [Neck Appearance] : the appearance of the neck was normal [Heart Rate And Rhythm] : heart rate and rhythm were normal [Arterial Pulses Normal] : the arterial pulses were normal [Heart Sounds] : normal S1 and S2 [Respiration, Rhythm And Depth] : normal respiratory rhythm and effort [FreeTextEntry1] : diminished BSs at right base and crackles heard [Abdomen Soft] : soft [Bowel Sounds] : normal bowel sounds [Abdomen Tenderness] : non-tender [Nail Clubbing] : no clubbing of the fingernails [Abnormal Walk] : normal gait [Skin Color & Pigmentation] : normal skin color and pigmentation [Cyanosis, Localized] : no localized cyanosis [] : no rash [Oriented To Time, Place, And Person] : oriented to person, place, and time [Impaired Insight] : insight and judgment were intact [Skin Turgor] : normal skin turgor [Affect] : the affect was normal

## 2020-02-28 NOTE — HISTORY OF PRESENT ILLNESS
[FreeTextEntry1] : Pt was sick for 2 weeks. Gave her cefdinir and then levaquin, which she is completing now. Reports overall feeling ok but still with cough and yellow sputum and wheezing and chest tightness.Has been feeling like this over 2 weeks. No fevers or chills.\par \par 84F with h/o emphysema/copd on triple therapy and mild cylindrical bronchiectasis, was hospitalized last month for pneumonia which grew pseudomonas. She reports feeling almost back to baseline today. No chronic cough but she reports in past growing lots on bugs in her sputum. No chronic sputum production but uses her nebs and acapella daily and religiously. ET limited, she gets winded easily. Had PFT this year with her pulmonologist.

## 2020-02-28 NOTE — ASSESSMENT
[FreeTextEntry1] : Persistent voluminous phlegm production and generalized malaise with RAE and wheezing. completing course of levaquin, cefdinir did nothing. culture sputum again and trial of prednisone. i  agree with triple therapy, nebs, saline and acapella.

## 2020-03-02 NOTE — ASSESSMENT
[FreeTextEntry1] : significant improvement of symptoms after levaquin and prednisone course. Will cont all regular inhalers, saline nebs and acapella.

## 2020-03-02 NOTE — HISTORY OF PRESENT ILLNESS
[FreeTextEntry1] : Doing much better, cough resolved, sob at baseline. feeling almost back to normal.

## 2020-03-02 NOTE — PHYSICAL EXAM
[General Appearance - Well Developed] : well developed [General Appearance - Well Nourished] : well nourished [Normal Conjunctiva] : the conjunctiva exhibited no abnormalities [Normal Oropharynx] : normal oropharynx [Neck Appearance] : the appearance of the neck was normal [Heart Rate And Rhythm] : heart rate and rhythm were normal [Heart Sounds] : normal S1 and S2 [Arterial Pulses Normal] : the arterial pulses were normal [Respiration, Rhythm And Depth] : normal respiratory rhythm and effort [Auscultation Breath Sounds / Voice Sounds] : lungs were clear to auscultation bilaterally [Bowel Sounds] : normal bowel sounds [Abdomen Soft] : soft [Abdomen Tenderness] : non-tender [Abnormal Walk] : normal gait [Nail Clubbing] : no clubbing of the fingernails [Cyanosis, Localized] : no localized cyanosis [Skin Color & Pigmentation] : normal skin color and pigmentation [Skin Turgor] : normal skin turgor [] : no rash [Oriented To Time, Place, And Person] : oriented to person, place, and time [Impaired Insight] : insight and judgment were intact [Affect] : the affect was normal

## 2020-04-10 ENCOUNTER — INPATIENT (INPATIENT)
Facility: HOSPITAL | Age: 85
LOS: 4 days | Discharge: HOME HEALTH | DRG: 871 | End: 2020-04-15
Attending: HOSPITALIST | Admitting: HOSPITALIST
Payer: MEDICARE

## 2020-04-10 VITALS
SYSTOLIC BLOOD PRESSURE: 116 MMHG | TEMPERATURE: 101 F | OXYGEN SATURATION: 95 % | RESPIRATION RATE: 24 BRPM | HEART RATE: 125 BPM | DIASTOLIC BLOOD PRESSURE: 71 MMHG

## 2020-04-10 DIAGNOSIS — Z96.641 PRESENCE OF RIGHT ARTIFICIAL HIP JOINT: Chronic | ICD-10-CM

## 2020-04-10 LAB
ALBUMIN SERPL ELPH-MCNC: 3.3 G/DL — SIGNIFICANT CHANGE UP (ref 3.3–5)
ALP SERPL-CCNC: 42 U/L — SIGNIFICANT CHANGE UP (ref 40–120)
ALT FLD-CCNC: 7 U/L — LOW (ref 10–45)
ANION GAP SERPL CALC-SCNC: 11 MMOL/L — SIGNIFICANT CHANGE UP (ref 5–17)
APPEARANCE UR: CLEAR — SIGNIFICANT CHANGE UP
APTT BLD: 35.3 SEC — SIGNIFICANT CHANGE UP (ref 27.5–36.3)
AST SERPL-CCNC: 28 U/L — SIGNIFICANT CHANGE UP (ref 10–40)
BACTERIA # UR AUTO: PRESENT /HPF
BASE EXCESS BLDA CALC-SCNC: 2.4 MMOL/L — SIGNIFICANT CHANGE UP (ref -2–3)
BASOPHILS # BLD AUTO: 0.02 K/UL — SIGNIFICANT CHANGE UP (ref 0–0.2)
BASOPHILS NFR BLD AUTO: 0.2 % — SIGNIFICANT CHANGE UP (ref 0–2)
BILIRUB SERPL-MCNC: 0.4 MG/DL — SIGNIFICANT CHANGE UP (ref 0.2–1.2)
BILIRUB UR-MCNC: NEGATIVE — SIGNIFICANT CHANGE UP
BUN SERPL-MCNC: 17 MG/DL — SIGNIFICANT CHANGE UP (ref 7–23)
CALCIUM SERPL-MCNC: 9.5 MG/DL — SIGNIFICANT CHANGE UP (ref 8.4–10.5)
CHLORIDE SERPL-SCNC: 89 MMOL/L — LOW (ref 96–108)
CK SERPL-CCNC: 117 U/L — SIGNIFICANT CHANGE UP (ref 25–170)
CO2 SERPL-SCNC: 26 MMOL/L — SIGNIFICANT CHANGE UP (ref 22–31)
COLOR SPEC: YELLOW — SIGNIFICANT CHANGE UP
CREAT SERPL-MCNC: 0.75 MG/DL — SIGNIFICANT CHANGE UP (ref 0.5–1.3)
CRP SERPL-MCNC: 8.85 MG/DL — HIGH (ref 0–0.4)
D DIMER BLD IA.RAPID-MCNC: <150 NG/ML DDU — SIGNIFICANT CHANGE UP
DIFF PNL FLD: ABNORMAL
EOSINOPHIL # BLD AUTO: 0.01 K/UL — SIGNIFICANT CHANGE UP (ref 0–0.5)
EOSINOPHIL NFR BLD AUTO: 0.1 % — SIGNIFICANT CHANGE UP (ref 0–6)
EPI CELLS # UR: SIGNIFICANT CHANGE UP /HPF (ref 0–5)
FERRITIN SERPL-MCNC: 250 NG/ML — HIGH (ref 15–150)
FIBRINOGEN PPP-MCNC: 480 MG/DL — HIGH (ref 258–438)
GLUCOSE SERPL-MCNC: 99 MG/DL — SIGNIFICANT CHANGE UP (ref 70–99)
GLUCOSE UR QL: NEGATIVE — SIGNIFICANT CHANGE UP
HCO3 BLDA-SCNC: 26 MMOL/L — SIGNIFICANT CHANGE UP (ref 21–28)
HCT VFR BLD CALC: 40.8 % — SIGNIFICANT CHANGE UP (ref 34.5–45)
HGB BLD-MCNC: 13.9 G/DL — SIGNIFICANT CHANGE UP (ref 11.5–15.5)
IMM GRANULOCYTES NFR BLD AUTO: 0.5 % — SIGNIFICANT CHANGE UP (ref 0–1.5)
INR BLD: 1.41 — HIGH (ref 0.88–1.16)
KETONES UR-MCNC: ABNORMAL MG/DL
LACTATE SERPL-SCNC: 0.7 MMOL/L — SIGNIFICANT CHANGE UP (ref 0.5–2)
LDH SERPL L TO P-CCNC: 266 U/L — HIGH (ref 50–242)
LEUKOCYTE ESTERASE UR-ACNC: NEGATIVE — SIGNIFICANT CHANGE UP
LYMPHOCYTES # BLD AUTO: 0.62 K/UL — LOW (ref 1–3.3)
LYMPHOCYTES # BLD AUTO: 6.5 % — LOW (ref 13–44)
MCHC RBC-ENTMCNC: 32.5 PG — SIGNIFICANT CHANGE UP (ref 27–34)
MCHC RBC-ENTMCNC: 34.1 GM/DL — SIGNIFICANT CHANGE UP (ref 32–36)
MCV RBC AUTO: 95.3 FL — SIGNIFICANT CHANGE UP (ref 80–100)
MONOCYTES # BLD AUTO: 1.18 K/UL — HIGH (ref 0–0.9)
MONOCYTES NFR BLD AUTO: 12.3 % — SIGNIFICANT CHANGE UP (ref 2–14)
NEUTROPHILS # BLD AUTO: 7.72 K/UL — HIGH (ref 1.8–7.4)
NEUTROPHILS NFR BLD AUTO: 80.4 % — HIGH (ref 43–77)
NITRITE UR-MCNC: NEGATIVE — SIGNIFICANT CHANGE UP
NRBC # BLD: 0 /100 WBCS — SIGNIFICANT CHANGE UP (ref 0–0)
NT-PROBNP SERPL-SCNC: 552 PG/ML — HIGH (ref 0–300)
PCO2 BLDA: 36 MMHG — SIGNIFICANT CHANGE UP (ref 32–45)
PH BLDA: 7.48 — HIGH (ref 7.35–7.45)
PH UR: 6 — SIGNIFICANT CHANGE UP (ref 5–8)
PLATELET # BLD AUTO: 240 K/UL — SIGNIFICANT CHANGE UP (ref 150–400)
PO2 BLDA: 97 MMHG — SIGNIFICANT CHANGE UP (ref 83–108)
POTASSIUM SERPL-MCNC: 5.1 MMOL/L — SIGNIFICANT CHANGE UP (ref 3.5–5.3)
POTASSIUM SERPL-SCNC: 5.1 MMOL/L — SIGNIFICANT CHANGE UP (ref 3.5–5.3)
PROCALCITONIN SERPL-MCNC: 0.14 NG/ML — HIGH (ref 0.02–0.1)
PROT SERPL-MCNC: 6.1 G/DL — SIGNIFICANT CHANGE UP (ref 6–8.3)
PROT UR-MCNC: 30 MG/DL
PROTHROM AB SERPL-ACNC: 16.2 SEC — HIGH (ref 10–12.9)
RAPID RVP RESULT: SIGNIFICANT CHANGE UP
RBC # BLD: 4.28 M/UL — SIGNIFICANT CHANGE UP (ref 3.8–5.2)
RBC # FLD: 13.6 % — SIGNIFICANT CHANGE UP (ref 10.3–14.5)
RBC CASTS # UR COMP ASSIST: ABNORMAL /HPF
SAO2 % BLDA: 98 % — SIGNIFICANT CHANGE UP (ref 95–100)
SARS-COV-2 RNA SPEC QL NAA+PROBE: SIGNIFICANT CHANGE UP
SODIUM SERPL-SCNC: 126 MMOL/L — LOW (ref 135–145)
SP GR SPEC: 1.02 — SIGNIFICANT CHANGE UP (ref 1–1.03)
TROPONIN T SERPL-MCNC: <0.01 NG/ML — SIGNIFICANT CHANGE UP (ref 0–0.01)
UROBILINOGEN FLD QL: 0.2 E.U./DL — SIGNIFICANT CHANGE UP
WBC # BLD: 9.6 K/UL — SIGNIFICANT CHANGE UP (ref 3.8–10.5)
WBC # FLD AUTO: 9.6 K/UL — SIGNIFICANT CHANGE UP (ref 3.8–10.5)
WBC UR QL: < 5 /HPF — SIGNIFICANT CHANGE UP

## 2020-04-10 PROCEDURE — 70450 CT HEAD/BRAIN W/O DYE: CPT | Mod: 26

## 2020-04-10 PROCEDURE — 80053 COMPREHEN METABOLIC PANEL: CPT

## 2020-04-10 PROCEDURE — 99223 1ST HOSP IP/OBS HIGH 75: CPT | Mod: GC

## 2020-04-10 PROCEDURE — 84443 ASSAY THYROID STIM HORMONE: CPT

## 2020-04-10 PROCEDURE — 93010 ELECTROCARDIOGRAM REPORT: CPT

## 2020-04-10 PROCEDURE — 80048 BASIC METABOLIC PNL TOTAL CA: CPT

## 2020-04-10 PROCEDURE — 99291 CRITICAL CARE FIRST HOUR: CPT | Mod: 25

## 2020-04-10 PROCEDURE — 71045 X-RAY EXAM CHEST 1 VIEW: CPT | Mod: 26

## 2020-04-10 PROCEDURE — 83880 ASSAY OF NATRIURETIC PEPTIDE: CPT

## 2020-04-10 PROCEDURE — 71045 X-RAY EXAM CHEST 1 VIEW: CPT

## 2020-04-10 PROCEDURE — 82803 BLOOD GASES ANY COMBINATION: CPT

## 2020-04-10 PROCEDURE — 84132 ASSAY OF SERUM POTASSIUM: CPT

## 2020-04-10 PROCEDURE — 87206 SMEAR FLUORESCENT/ACID STAI: CPT

## 2020-04-10 PROCEDURE — 85610 PROTHROMBIN TIME: CPT

## 2020-04-10 PROCEDURE — 84484 ASSAY OF TROPONIN QUANT: CPT

## 2020-04-10 PROCEDURE — 87015 SPECIMEN INFECT AGNT CONCNTJ: CPT

## 2020-04-10 PROCEDURE — 80061 LIPID PANEL: CPT

## 2020-04-10 PROCEDURE — 87631 RESP VIRUS 3-5 TARGETS: CPT

## 2020-04-10 PROCEDURE — 99285 EMERGENCY DEPT VISIT HI MDM: CPT

## 2020-04-10 PROCEDURE — 80162 ASSAY OF DIGOXIN TOTAL: CPT

## 2020-04-10 PROCEDURE — 85027 COMPLETE CBC AUTOMATED: CPT

## 2020-04-10 PROCEDURE — 87116 MYCOBACTERIA CULTURE: CPT

## 2020-04-10 PROCEDURE — 82553 CREATINE MB FRACTION: CPT

## 2020-04-10 PROCEDURE — 87635 SARS-COV-2 COVID-19 AMP PRB: CPT

## 2020-04-10 PROCEDURE — 94640 AIRWAY INHALATION TREATMENT: CPT

## 2020-04-10 PROCEDURE — 85730 THROMBOPLASTIN TIME PARTIAL: CPT

## 2020-04-10 PROCEDURE — 82330 ASSAY OF CALCIUM: CPT

## 2020-04-10 PROCEDURE — 85025 COMPLETE CBC W/AUTO DIFF WBC: CPT

## 2020-04-10 PROCEDURE — 36415 COLL VENOUS BLD VENIPUNCTURE: CPT

## 2020-04-10 PROCEDURE — 80299 QUANTITATIVE ASSAY DRUG: CPT

## 2020-04-10 PROCEDURE — 83735 ASSAY OF MAGNESIUM: CPT

## 2020-04-10 PROCEDURE — 71250 CT THORAX DX C-: CPT

## 2020-04-10 PROCEDURE — 97161 PT EVAL LOW COMPLEX 20 MIN: CPT

## 2020-04-10 PROCEDURE — 87040 BLOOD CULTURE FOR BACTERIA: CPT

## 2020-04-10 PROCEDURE — 97116 GAIT TRAINING THERAPY: CPT

## 2020-04-10 PROCEDURE — 82550 ASSAY OF CK (CPK): CPT

## 2020-04-10 PROCEDURE — 93306 TTE W/DOPPLER COMPLETE: CPT

## 2020-04-10 PROCEDURE — 83605 ASSAY OF LACTIC ACID: CPT

## 2020-04-10 PROCEDURE — 96374 THER/PROPH/DIAG INJ IV PUSH: CPT

## 2020-04-10 PROCEDURE — 84295 ASSAY OF SERUM SODIUM: CPT

## 2020-04-10 PROCEDURE — 93005 ELECTROCARDIOGRAM TRACING: CPT

## 2020-04-10 PROCEDURE — 71250 CT THORAX DX C-: CPT | Mod: 26

## 2020-04-10 RX ORDER — DIGOXIN 250 MCG
0.25 TABLET ORAL DAILY
Refills: 0 | Status: DISCONTINUED | OUTPATIENT
Start: 2020-04-10 | End: 2020-04-15

## 2020-04-10 RX ORDER — TIOTROPIUM BROMIDE AND OLODATEROL 3.124; 2.736 UG/1; UG/1
2 SPRAY, METERED RESPIRATORY (INHALATION)
Qty: 0 | Refills: 0 | DISCHARGE

## 2020-04-10 RX ORDER — MONTELUKAST 4 MG/1
10 TABLET, CHEWABLE ORAL
Refills: 0 | Status: DISCONTINUED | OUTPATIENT
Start: 2020-04-10 | End: 2020-04-15

## 2020-04-10 RX ORDER — MONTELUKAST 4 MG/1
1 TABLET, CHEWABLE ORAL
Qty: 0 | Refills: 0 | DISCHARGE

## 2020-04-10 RX ORDER — ATORVASTATIN CALCIUM 80 MG/1
1 TABLET, FILM COATED ORAL
Qty: 0 | Refills: 0 | DISCHARGE

## 2020-04-10 RX ORDER — ATORVASTATIN CALCIUM 80 MG/1
40 TABLET, FILM COATED ORAL AT BEDTIME
Refills: 0 | Status: DISCONTINUED | OUTPATIENT
Start: 2020-04-10 | End: 2020-04-15

## 2020-04-10 RX ORDER — ACETAMINOPHEN 500 MG
650 TABLET ORAL ONCE
Refills: 0 | Status: COMPLETED | OUTPATIENT
Start: 2020-04-10 | End: 2020-04-10

## 2020-04-10 RX ORDER — AZITHROMYCIN 500 MG/1
250 TABLET, FILM COATED ORAL EVERY 24 HOURS
Refills: 0 | Status: COMPLETED | OUTPATIENT
Start: 2020-04-11 | End: 2020-04-14

## 2020-04-10 RX ORDER — LOSARTAN POTASSIUM 100 MG/1
50 TABLET, FILM COATED ORAL DAILY
Refills: 0 | Status: DISCONTINUED | OUTPATIENT
Start: 2020-04-11 | End: 2020-04-15

## 2020-04-10 RX ORDER — HYDROXYCHLOROQUINE SULFATE 200 MG
400 TABLET ORAL EVERY 24 HOURS
Refills: 0 | Status: COMPLETED | OUTPATIENT
Start: 2020-04-11 | End: 2020-04-14

## 2020-04-10 RX ORDER — ACETAMINOPHEN 500 MG
650 TABLET ORAL EVERY 6 HOURS
Refills: 0 | Status: DISCONTINUED | OUTPATIENT
Start: 2020-04-10 | End: 2020-04-15

## 2020-04-10 RX ORDER — DIGOXIN 250 MCG
1 TABLET ORAL
Qty: 0 | Refills: 0 | DISCHARGE

## 2020-04-10 RX ORDER — SODIUM CHLORIDE 9 MG/ML
500 INJECTION INTRAMUSCULAR; INTRAVENOUS; SUBCUTANEOUS ONCE
Refills: 0 | Status: COMPLETED | OUTPATIENT
Start: 2020-04-10 | End: 2020-04-10

## 2020-04-10 RX ORDER — BUDESONIDE AND FORMOTEROL FUMARATE DIHYDRATE 160; 4.5 UG/1; UG/1
2 AEROSOL RESPIRATORY (INHALATION)
Refills: 0 | Status: DISCONTINUED | OUTPATIENT
Start: 2020-04-10 | End: 2020-04-15

## 2020-04-10 RX ORDER — ALBUTEROL 90 UG/1
2 AEROSOL, METERED ORAL EVERY 4 HOURS
Refills: 0 | Status: DISCONTINUED | OUTPATIENT
Start: 2020-04-10 | End: 2020-04-15

## 2020-04-10 RX ORDER — DIGOXIN 250 MCG
0.25 TABLET ORAL
Qty: 0 | Refills: 0 | DISCHARGE

## 2020-04-10 RX ORDER — HYDROXYCHLOROQUINE SULFATE 200 MG
800 TABLET ORAL ONCE
Refills: 0 | Status: COMPLETED | OUTPATIENT
Start: 2020-04-10 | End: 2020-04-10

## 2020-04-10 RX ORDER — LEVOTHYROXINE SODIUM 125 MCG
1 TABLET ORAL
Qty: 0 | Refills: 0 | DISCHARGE

## 2020-04-10 RX ORDER — APIXABAN 2.5 MG/1
2.5 TABLET, FILM COATED ORAL EVERY 12 HOURS
Refills: 0 | Status: DISCONTINUED | OUTPATIENT
Start: 2020-04-10 | End: 2020-04-15

## 2020-04-10 RX ORDER — ALBUTEROL 90 UG/1
2 AEROSOL, METERED ORAL
Qty: 0 | Refills: 0 | DISCHARGE

## 2020-04-10 RX ORDER — APIXABAN 2.5 MG/1
0 TABLET, FILM COATED ORAL
Qty: 0 | Refills: 0 | DISCHARGE

## 2020-04-10 RX ORDER — LEVOTHYROXINE SODIUM 125 MCG
75 TABLET ORAL DAILY
Refills: 0 | Status: DISCONTINUED | OUTPATIENT
Start: 2020-04-11 | End: 2020-04-15

## 2020-04-10 RX ORDER — CANDESARTAN CILEXETIL 8 MG/1
1 TABLET ORAL
Qty: 0 | Refills: 0 | DISCHARGE

## 2020-04-10 RX ORDER — TIOTROPIUM BROMIDE 18 UG/1
1 CAPSULE ORAL; RESPIRATORY (INHALATION)
Qty: 0 | Refills: 0 | DISCHARGE

## 2020-04-10 RX ORDER — CANDESARTAN CILEXETIL 8 MG/1
0 TABLET ORAL
Qty: 0 | Refills: 0 | DISCHARGE

## 2020-04-10 RX ORDER — ATORVASTATIN CALCIUM 80 MG/1
0 TABLET, FILM COATED ORAL
Qty: 0 | Refills: 0 | DISCHARGE

## 2020-04-10 RX ORDER — GABAPENTIN 400 MG/1
1 CAPSULE ORAL
Qty: 0 | Refills: 0 | DISCHARGE

## 2020-04-10 RX ORDER — FLUTICASONE PROPIONATE AND SALMETEROL 50; 250 UG/1; UG/1
0 POWDER ORAL; RESPIRATORY (INHALATION)
Qty: 0 | Refills: 0 | DISCHARGE

## 2020-04-10 RX ORDER — TIOTROPIUM BROMIDE 18 UG/1
2.5 CAPSULE ORAL; RESPIRATORY (INHALATION)
Qty: 0 | Refills: 0 | DISCHARGE

## 2020-04-10 RX ORDER — PIPERACILLIN AND TAZOBACTAM 4; .5 G/20ML; G/20ML
3.38 INJECTION, POWDER, LYOPHILIZED, FOR SOLUTION INTRAVENOUS EVERY 6 HOURS
Refills: 0 | Status: DISCONTINUED | OUTPATIENT
Start: 2020-04-10 | End: 2020-04-11

## 2020-04-10 RX ORDER — ALBUTEROL 90 UG/1
2 AEROSOL, METERED ORAL ONCE
Refills: 0 | Status: COMPLETED | OUTPATIENT
Start: 2020-04-10 | End: 2020-04-10

## 2020-04-10 RX ORDER — FLUTICASONE PROPIONATE AND SALMETEROL 50; 250 UG/1; UG/1
2 POWDER ORAL; RESPIRATORY (INHALATION)
Qty: 0 | Refills: 0 | DISCHARGE

## 2020-04-10 RX ORDER — AZITHROMYCIN 500 MG/1
500 TABLET, FILM COATED ORAL ONCE
Refills: 0 | Status: COMPLETED | OUTPATIENT
Start: 2020-04-10 | End: 2020-04-10

## 2020-04-10 RX ORDER — DABIGATRAN ETEXILATE MESYLATE 150 MG/1
1 CAPSULE ORAL
Qty: 0 | Refills: 0 | DISCHARGE

## 2020-04-10 RX ADMIN — ALBUTEROL 2 PUFF(S): 90 AEROSOL, METERED ORAL at 18:55

## 2020-04-10 RX ADMIN — Medication 800 MILLIGRAM(S): at 22:31

## 2020-04-10 RX ADMIN — Medication 650 MILLIGRAM(S): at 18:55

## 2020-04-10 RX ADMIN — PIPERACILLIN AND TAZOBACTAM 200 GRAM(S): 4; .5 INJECTION, POWDER, LYOPHILIZED, FOR SOLUTION INTRAVENOUS at 22:31

## 2020-04-10 RX ADMIN — AZITHROMYCIN 500 MILLIGRAM(S): 500 TABLET, FILM COATED ORAL at 18:55

## 2020-04-10 NOTE — ED PROVIDER NOTE - CARE PLAN
Principal Discharge DX:	COVID-19 virus infection  Secondary Diagnosis:	Hypoxia Principal Discharge DX:	Fever  Secondary Diagnosis:	Hypoxia

## 2020-04-10 NOTE — ED PROVIDER NOTE - CLINICAL SUMMARY MEDICAL DECISION MAKING FREE TEXT BOX
patient with URI/viral syndrome symptoms clinically.  noted to be hypoxic on room air to 88%.  improved with supplemental oxygen 4L nc to 96%.  highly suspect covid19 infection.  rvp/ covid testing sent along with labs to eval acs, chf, pneumonia, inflammatory markers, sepsis.   afib with rvr on arrival, suspect secondary to fever/ infection.  cxr done showing *** .  started azithromycin.  tylenol for fever.  due to oxygen requirement, will admit for further management of acute hypoxic respiratory failure secondary to suspected covid19 infection patient with fever, shortness of breath.  noted to be hypoxic on room air to 88%.  improved with supplemental oxygen 4L nc to 96%.  highly suspect covid19 infection based on symptoms and close contact with + patient.  azithromycin given.  rvp/ covid testing sent along with labs to eval acs, chf, pneumonia, inflammatory markers, sepsis.   afib with rvr on arrival, suspect secondary to fever/ infection.  cxr done showing possible patchy rul infiltrate.  ct done to better eval and no focal infiltrate or classic covid findings seen.  tylenol for fever.  ct head done as may have had fall at home and appears slow in thinking, on anticoagulant, and no acute bleed seen.  covid pcr returned negative but still with high suspicion clinically.  will admit due to hypoxia/ oxygen requirement (stable on 2L nc), may need reswab tomorrow.

## 2020-04-11 DIAGNOSIS — Z22.39 CARRIER OF OTHER SPECIFIED BACTERIAL DISEASES: ICD-10-CM

## 2020-04-11 DIAGNOSIS — R63.8 OTHER SYMPTOMS AND SIGNS CONCERNING FOOD AND FLUID INTAKE: ICD-10-CM

## 2020-04-11 DIAGNOSIS — I10 ESSENTIAL (PRIMARY) HYPERTENSION: ICD-10-CM

## 2020-04-11 DIAGNOSIS — J15.6 PNEUMONIA DUE TO OTHER GRAM-NEGATIVE BACTERIA: ICD-10-CM

## 2020-04-11 DIAGNOSIS — I48.91 UNSPECIFIED ATRIAL FIBRILLATION: ICD-10-CM

## 2020-04-11 DIAGNOSIS — E87.1 HYPO-OSMOLALITY AND HYPONATREMIA: ICD-10-CM

## 2020-04-11 DIAGNOSIS — U07.1 COVID-19: ICD-10-CM

## 2020-04-11 DIAGNOSIS — I63.9 CEREBRAL INFARCTION, UNSPECIFIED: ICD-10-CM

## 2020-04-11 DIAGNOSIS — J42 UNSPECIFIED CHRONIC BRONCHITIS: ICD-10-CM

## 2020-04-11 LAB
ALBUMIN SERPL ELPH-MCNC: 3.1 G/DL — LOW (ref 3.3–5)
ALBUMIN SERPL ELPH-MCNC: 3.1 G/DL — LOW (ref 3.3–5)
ALP SERPL-CCNC: 38 U/L — LOW (ref 40–120)
ALP SERPL-CCNC: 40 U/L — SIGNIFICANT CHANGE UP (ref 40–120)
ALT FLD-CCNC: 6 U/L — LOW (ref 10–45)
ALT FLD-CCNC: 9 U/L — LOW (ref 10–45)
ANION GAP SERPL CALC-SCNC: 10 MMOL/L — SIGNIFICANT CHANGE UP (ref 5–17)
ANION GAP SERPL CALC-SCNC: 4 MMOL/L — LOW (ref 5–17)
AST SERPL-CCNC: 23 U/L — SIGNIFICANT CHANGE UP (ref 10–40)
AST SERPL-CCNC: 25 U/L — SIGNIFICANT CHANGE UP (ref 10–40)
BASOPHILS # BLD AUTO: 0.02 K/UL — SIGNIFICANT CHANGE UP (ref 0–0.2)
BASOPHILS NFR BLD AUTO: 0.3 % — SIGNIFICANT CHANGE UP (ref 0–2)
BILIRUB SERPL-MCNC: 0.4 MG/DL — SIGNIFICANT CHANGE UP (ref 0.2–1.2)
BILIRUB SERPL-MCNC: 0.5 MG/DL — SIGNIFICANT CHANGE UP (ref 0.2–1.2)
BUN SERPL-MCNC: 14 MG/DL — SIGNIFICANT CHANGE UP (ref 7–23)
BUN SERPL-MCNC: 15 MG/DL — SIGNIFICANT CHANGE UP (ref 7–23)
CALCIUM SERPL-MCNC: 8.7 MG/DL — SIGNIFICANT CHANGE UP (ref 8.4–10.5)
CALCIUM SERPL-MCNC: 8.9 MG/DL — SIGNIFICANT CHANGE UP (ref 8.4–10.5)
CHLORIDE SERPL-SCNC: 90 MMOL/L — LOW (ref 96–108)
CHLORIDE SERPL-SCNC: 97 MMOL/L — SIGNIFICANT CHANGE UP (ref 96–108)
CO2 SERPL-SCNC: 26 MMOL/L — SIGNIFICANT CHANGE UP (ref 22–31)
CO2 SERPL-SCNC: 28 MMOL/L — SIGNIFICANT CHANGE UP (ref 22–31)
CREAT SERPL-MCNC: 0.72 MG/DL — SIGNIFICANT CHANGE UP (ref 0.5–1.3)
CREAT SERPL-MCNC: 0.79 MG/DL — SIGNIFICANT CHANGE UP (ref 0.5–1.3)
CRP SERPL-MCNC: 10.28 MG/DL — HIGH (ref 0–0.4)
D DIMER BLD IA.RAPID-MCNC: <150 NG/ML DDU — SIGNIFICANT CHANGE UP
DIGOXIN SERPL-MCNC: 1.1 NG/ML — SIGNIFICANT CHANGE UP (ref 0.8–2)
EOSINOPHIL # BLD AUTO: 0.04 K/UL — SIGNIFICANT CHANGE UP (ref 0–0.5)
EOSINOPHIL NFR BLD AUTO: 0.6 % — SIGNIFICANT CHANGE UP (ref 0–6)
FERRITIN SERPL-MCNC: 237 NG/ML — HIGH (ref 15–150)
GLUCOSE SERPL-MCNC: 85 MG/DL — SIGNIFICANT CHANGE UP (ref 70–99)
GLUCOSE SERPL-MCNC: 98 MG/DL — SIGNIFICANT CHANGE UP (ref 70–99)
HCT VFR BLD CALC: 39.7 % — SIGNIFICANT CHANGE UP (ref 34.5–45)
HGB BLD-MCNC: 13 G/DL — SIGNIFICANT CHANGE UP (ref 11.5–15.5)
IMM GRANULOCYTES NFR BLD AUTO: 0.9 % — SIGNIFICANT CHANGE UP (ref 0–1.5)
LYMPHOCYTES # BLD AUTO: 0.93 K/UL — LOW (ref 1–3.3)
LYMPHOCYTES # BLD AUTO: 14 % — SIGNIFICANT CHANGE UP (ref 13–44)
MAGNESIUM SERPL-MCNC: 1.8 MG/DL — SIGNIFICANT CHANGE UP (ref 1.6–2.6)
MAGNESIUM SERPL-MCNC: 1.9 MG/DL — SIGNIFICANT CHANGE UP (ref 1.6–2.6)
MCHC RBC-ENTMCNC: 32.2 PG — SIGNIFICANT CHANGE UP (ref 27–34)
MCHC RBC-ENTMCNC: 32.7 GM/DL — SIGNIFICANT CHANGE UP (ref 32–36)
MCV RBC AUTO: 98.3 FL — SIGNIFICANT CHANGE UP (ref 80–100)
MONOCYTES # BLD AUTO: 0.99 K/UL — HIGH (ref 0–0.9)
MONOCYTES NFR BLD AUTO: 14.9 % — HIGH (ref 2–14)
NEUTROPHILS # BLD AUTO: 4.62 K/UL — SIGNIFICANT CHANGE UP (ref 1.8–7.4)
NEUTROPHILS NFR BLD AUTO: 69.3 % — SIGNIFICANT CHANGE UP (ref 43–77)
NRBC # BLD: 0 /100 WBCS — SIGNIFICANT CHANGE UP (ref 0–0)
PHOSPHATE SERPL-MCNC: 3 MG/DL — SIGNIFICANT CHANGE UP (ref 2.5–4.5)
PLATELET # BLD AUTO: 208 K/UL — SIGNIFICANT CHANGE UP (ref 150–400)
POTASSIUM SERPL-MCNC: 4.2 MMOL/L — SIGNIFICANT CHANGE UP (ref 3.5–5.3)
POTASSIUM SERPL-MCNC: 4.3 MMOL/L — SIGNIFICANT CHANGE UP (ref 3.5–5.3)
POTASSIUM SERPL-SCNC: 4.2 MMOL/L — SIGNIFICANT CHANGE UP (ref 3.5–5.3)
POTASSIUM SERPL-SCNC: 4.3 MMOL/L — SIGNIFICANT CHANGE UP (ref 3.5–5.3)
PROCALCITONIN SERPL-MCNC: 0.16 NG/ML — HIGH (ref 0.02–0.1)
PROT SERPL-MCNC: 5.3 G/DL — LOW (ref 6–8.3)
PROT SERPL-MCNC: 5.5 G/DL — LOW (ref 6–8.3)
RBC # BLD: 4.04 M/UL — SIGNIFICANT CHANGE UP (ref 3.8–5.2)
RBC # FLD: 14 % — SIGNIFICANT CHANGE UP (ref 10.3–14.5)
SARS-COV-2 RNA SPEC QL NAA+PROBE: DETECTED
SODIUM SERPL-SCNC: 126 MMOL/L — LOW (ref 135–145)
SODIUM SERPL-SCNC: 129 MMOL/L — LOW (ref 135–145)
TSH SERPL-MCNC: 1.39 UIU/ML — SIGNIFICANT CHANGE UP (ref 0.35–4.94)
WBC # BLD: 6.66 K/UL — SIGNIFICANT CHANGE UP (ref 3.8–10.5)
WBC # FLD AUTO: 6.66 K/UL — SIGNIFICANT CHANGE UP (ref 3.8–10.5)

## 2020-04-11 PROCEDURE — 99233 SBSQ HOSP IP/OBS HIGH 50: CPT | Mod: GC

## 2020-04-11 RX ORDER — FAMOTIDINE 10 MG/ML
20 INJECTION INTRAVENOUS DAILY
Refills: 0 | Status: DISCONTINUED | OUTPATIENT
Start: 2020-04-11 | End: 2020-04-15

## 2020-04-11 RX ORDER — PIPERACILLIN AND TAZOBACTAM 4; .5 G/20ML; G/20ML
3.38 INJECTION, POWDER, LYOPHILIZED, FOR SOLUTION INTRAVENOUS EVERY 6 HOURS
Refills: 0 | Status: DISCONTINUED | OUTPATIENT
Start: 2020-04-11 | End: 2020-04-13

## 2020-04-11 RX ORDER — SODIUM CHLORIDE 9 MG/ML
500 INJECTION INTRAMUSCULAR; INTRAVENOUS; SUBCUTANEOUS ONCE
Refills: 0 | Status: COMPLETED | OUTPATIENT
Start: 2020-04-11 | End: 2020-04-11

## 2020-04-11 RX ORDER — PIPERACILLIN AND TAZOBACTAM 4; .5 G/20ML; G/20ML
3.38 INJECTION, POWDER, LYOPHILIZED, FOR SOLUTION INTRAVENOUS ONCE
Refills: 0 | Status: COMPLETED | OUTPATIENT
Start: 2020-04-11 | End: 2020-04-11

## 2020-04-11 RX ADMIN — Medication 400 MILLIGRAM(S): at 22:00

## 2020-04-11 RX ADMIN — APIXABAN 2.5 MILLIGRAM(S): 2.5 TABLET, FILM COATED ORAL at 01:05

## 2020-04-11 RX ADMIN — PIPERACILLIN AND TAZOBACTAM 200 GRAM(S): 4; .5 INJECTION, POWDER, LYOPHILIZED, FOR SOLUTION INTRAVENOUS at 16:00

## 2020-04-11 RX ADMIN — BUDESONIDE AND FORMOTEROL FUMARATE DIHYDRATE 2 PUFF(S): 160; 4.5 AEROSOL RESPIRATORY (INHALATION) at 22:01

## 2020-04-11 RX ADMIN — PIPERACILLIN AND TAZOBACTAM 200 GRAM(S): 4; .5 INJECTION, POWDER, LYOPHILIZED, FOR SOLUTION INTRAVENOUS at 06:10

## 2020-04-11 RX ADMIN — SODIUM CHLORIDE 1000 MILLILITER(S): 9 INJECTION INTRAMUSCULAR; INTRAVENOUS; SUBCUTANEOUS at 01:07

## 2020-04-11 RX ADMIN — SODIUM CHLORIDE 166.67 MILLILITER(S): 9 INJECTION INTRAMUSCULAR; INTRAVENOUS; SUBCUTANEOUS at 13:16

## 2020-04-11 RX ADMIN — ATORVASTATIN CALCIUM 40 MILLIGRAM(S): 80 TABLET, FILM COATED ORAL at 22:00

## 2020-04-11 RX ADMIN — AZITHROMYCIN 250 MILLIGRAM(S): 500 TABLET, FILM COATED ORAL at 22:00

## 2020-04-11 RX ADMIN — MONTELUKAST 10 MILLIGRAM(S): 4 TABLET, CHEWABLE ORAL at 09:05

## 2020-04-11 RX ADMIN — LOSARTAN POTASSIUM 50 MILLIGRAM(S): 100 TABLET, FILM COATED ORAL at 05:55

## 2020-04-11 RX ADMIN — APIXABAN 2.5 MILLIGRAM(S): 2.5 TABLET, FILM COATED ORAL at 09:05

## 2020-04-11 RX ADMIN — BUDESONIDE AND FORMOTEROL FUMARATE DIHYDRATE 2 PUFF(S): 160; 4.5 AEROSOL RESPIRATORY (INHALATION) at 08:41

## 2020-04-11 RX ADMIN — Medication 650 MILLIGRAM(S): at 22:33

## 2020-04-11 RX ADMIN — Medication 75 MICROGRAM(S): at 05:55

## 2020-04-11 RX ADMIN — PIPERACILLIN AND TAZOBACTAM 200 GRAM(S): 4; .5 INJECTION, POWDER, LYOPHILIZED, FOR SOLUTION INTRAVENOUS at 01:06

## 2020-04-11 RX ADMIN — PIPERACILLIN AND TAZOBACTAM 200 GRAM(S): 4; .5 INJECTION, POWDER, LYOPHILIZED, FOR SOLUTION INTRAVENOUS at 22:00

## 2020-04-11 RX ADMIN — APIXABAN 2.5 MILLIGRAM(S): 2.5 TABLET, FILM COATED ORAL at 22:00

## 2020-04-11 RX ADMIN — MONTELUKAST 10 MILLIGRAM(S): 4 TABLET, CHEWABLE ORAL at 22:00

## 2020-04-11 RX ADMIN — Medication 0.25 MILLIGRAM(S): at 05:55

## 2020-04-11 NOTE — H&P ADULT - NSHPLABSRESULTS_GEN_ALL_CORE
LABS:                         13.9   9.60  )-----------( 240      ( 10 Apr 2020 18:19 )             40.8     04-10    126<L>  |  89<L>  |  17  ----------------------------<  99  5.1   |  26  |  0.75    Ca    9.5      10 Apr 2020 18:19  Mg     1.8     04-10    TPro  6.1  /  Alb  3.3  /  TBili  0.4  /  DBili  x   /  AST  28  /  ALT  7<L>  /  AlkPhos  42  04-10    PT/INR - ( 10 Apr 2020 18:19 )   PT: 16.2 sec;   INR: 1.41          PTT - ( 10 Apr 2020 18: )  PTT:35.3 sec  Urinalysis Basic - ( 10 Apr 2020 20:41 )    Color: Yellow / Appearance: Clear / S.025 / pH: x  Gluc: x / Ketone: Trace mg/dL  / Bili: Negative / Urobili: 0.2 E.U./dL   Blood: x / Protein: 30 mg/dL / Nitrite: NEGATIVE   Leuk Esterase: NEGATIVE / RBC: 5-10 /HPF / WBC < 5 /HPF   Sq Epi: x / Non Sq Epi: 0-5 /HPF / Bacteria: Present /HPF      CARDIAC MARKERS ( 10 Apr 2020 18:19 )  x     / <0.01 ng/mL / 117 U/L / x     / x          Serum Pro-Brain Natriuretic Peptide: 552 pg/mL (04-10 @ 18:19)    Lactate, Blood: 0.7 mmol/L (04-10 @ 18:36)    < from: CT Chest No Cont (19 @ 10:46) >    Impression:   1.  Multifocal foci of clustered nodules in both lungs, slightly   increased in extent, likely infectious/inflammatory such as   bronchopneumonia. Bronchitis and mucous plugging in both lungs,   pronounced in both lower lobes.  2.  Bilateral small pleural effusion.  3.  Severe emphysema.  4.  Stable enlarged main pulmonary artery measuring up to 4.0 cm and   stable dilated ascending aorta measuring up to 4.0 cm.    < end of copied text >    < from: CT Head No Cont (04.10.20 @ 19:32) >    IMPRESSION:   No acute intracranial hemorrhage or calvarial fracture.  Advanced small vessel ischemic change.    < end of copied text >    COVID-19 PCR . (04.10.20 @ 18:18)    COVID-19 PCR: NotDetec: This test has been validated by [employer] to be accurate;  though it has not been FDA cleared/approved by the usual pathway.  As with all laboratory tests, results should be correlated with clinical  findings.  https://www.fda.gov/media/813009/download  https://www.fda.gov/media/262617/download

## 2020-04-11 NOTE — PROGRESS NOTE ADULT - PROBLEM SELECTOR PLAN 2
-patient with history of pseudomonas in sputum, initially started on zosyn (4/10-4/11)   -Procal 0.16, will trend daily   - urine Cx negative -patient with history of pseudomonas in sputum,  -Procal 0.16, will trend daily   - urine Cx negative  - discussed patient with outpatient pulmonologist given patient has some secretions will start treatment with zosyn as bacterial superinfection cant be ruled out

## 2020-04-11 NOTE — H&P ADULT - PROBLEM SELECTOR PLAN 6
-per daughter patient with CVA cerebral/abdoul region last week, no residual deficits (was told it was a small stroke)  -c/w eliquis as above, and lipitor 40 mg QD

## 2020-04-11 NOTE — H&P ADULT - PROBLEM SELECTOR PLAN 1
-COVID-19 swab times 1 negative, will need to repeat next one in 24 hours  -lymphopenia present, fevers, and hypoxia on RA  -c/w azithro and plaquenil for now  -c/w 2 L NC, goal spO2 of 90% because of patients history of COPD. -COVID-19 swab times 1 negative, will need to repeat next one in 24 hours  -lymphopenia present, fevers, and hypoxia on RA  -c/w azithro and plaquenil for now  -c/w 2 L NC, goal spO2 of 90% because of patients history of COPD.  -reswab COVID 19 PCR 4-11 evening

## 2020-04-11 NOTE — PROGRESS NOTE ADULT - SUBJECTIVE AND OBJECTIVE BOX
Medicine Progress Note    Patient is a 84y old  Female who presents with a chief complaint of SOB fevers (2020 01:43)      SUBJECTIVE / OVERNIGHT EVENTS: admitted with cough and decreased appetite, Second COVID sample positive 4/10. on 2L NC overnight     This morning patient resting comfortably in bed. NC off patients face. Denies SOB or Cough. Patient reports 3-4 day hx of fatigue and decrease appetite. Denies Headache, blurred vision, CP, cough, abdominal pain or nausea this morning.     ADDITIONAL REVIEW OF SYSTEMS:    MEDICATIONS  (STANDING):  apixaban 2.5 milliGRAM(s) Oral every 12 hours  atorvastatin 40 milliGRAM(s) Oral at bedtime  azithromycin   Tablet 250 milliGRAM(s) Oral every 24 hours  budesonide 160 MICROgram(s)/formoterol 4.5 MICROgram(s) Inhaler 2 Puff(s) Inhalation two times a day  digoxin     Tablet 0.25 milliGRAM(s) Oral daily  hydroxychloroquine 400 milliGRAM(s) Oral every 24 hours  levothyroxine 75 MICROGram(s) Oral daily  losartan 50 milliGRAM(s) Oral daily  montelukast 10 milliGRAM(s) Oral <User Schedule>  piperacillin/tazobactam IVPB.. 3.375 Gram(s) IV Intermittent every 6 hours  sodium chloride 0.9% Bolus 500 milliLiter(s) IV Bolus once    MEDICATIONS  (PRN):  acetaminophen   Tablet .. 650 milliGRAM(s) Oral every 6 hours PRN Temp greater or equal to 38C (100.4F), Mild Pain (1 - 3)  ALBUTerol    90 MICROgram(s) HFA Inhaler 2 Puff(s) Inhalation every 4 hours PRN Shortness of Breath and/or Wheezing    CAPILLARY BLOOD GLUCOSE        I&O's Summary      PHYSICAL EXAM:  Vital Signs Last 24 Hrs  T(C): 36.4 (2020 06:06), Max: 38.4 (10 Apr 2020 17:35)  T(F): 97.6 (2020 06:06), Max: 101.1 (10 Apr 2020 17:35)  HR: 76 (2020 06:06) (76 - 125)  BP: 115/79 (2020 06:06) (112/59 - 126/62)  BP(mean): --  RR: 18 (2020 06:06) (18 - 24)  SpO2: 96% (2020 06:06) (87% - 97%)  CONSTITUTIONAL: NAD, well-developed, well-groomed  ENMT: Moist oral mucosa, no pharyngeal injection or exudates; normal dentition  RESPIRATORY: Normal respiratory effort; lungs are clear to auscultation bilaterally  CARDIOVASCULAR: Regular rate and rhythm, normal S1 and S2, no murmur/rub/gallop; No lower extremity edema; Peripheral pulses are 2+ bilaterally  ABDOMEN: Nontender to palpation, normoactive bowel sounds, no rebound/guarding; No hepatosplenomegaly  PSYCH: A+O to person, place, and time; affect appropriate  NEUROLOGY: CN 2-12 are intact and symmetric; no gross sensory deficits   SKIN: No rashes; no palpable lesions    LABS:                        13.0   6.66  )-----------( 208      ( 2020 09:44 )             39.7     04-11    126<L>  |  90<L>  |  15  ----------------------------<  85  4.2   |  26  |  0.79    Ca    8.7      2020 09:44  Phos  3.0     04-11  Mg     1.9     04-11    TPro  5.5<L>  /  Alb  3.1<L>  /  TBili  0.5  /  DBili  x   /  AST  23  /  ALT  6<L>  /  AlkPhos  38<L>  04-11    PT/INR - ( 10 Apr 2020 18:19 )   PT: 16.2 sec;   INR: 1.41          PTT - ( 10 Apr 2020 18:19 )  PTT:35.3 sec  CARDIAC MARKERS ( 10 Apr 2020 18:19 )  x     / <0.01 ng/mL / 117 U/L / x     / x          Urinalysis Basic - ( 10 Apr 2020 20:41 )    Color: Yellow / Appearance: Clear / S.025 / pH: x  Gluc: x / Ketone: Trace mg/dL  / Bili: Negative / Urobili: 0.2 E.U./dL   Blood: x / Protein: 30 mg/dL / Nitrite: NEGATIVE   Leuk Esterase: NEGATIVE / RBC: 5-10 /HPF / WBC < 5 /HPF   Sq Epi: x / Non Sq Epi: 0-5 /HPF / Bacteria: Present /HPF        Culture - Urine (collected 10 Apr 2020 22:01)  Source: .Urine Clean Catch (Midstream)  Preliminary Report (2020 07:53):    No growth to date    Culture - Blood (collected 10 Apr 2020 20:15)  Source: .Blood Blood-Peripheral  Preliminary Report (2020 09:04):    No growth at 12 hours    Culture - Blood (collected 10 Apr 2020 20:15)  Source: .Blood Blood-Peripheral  Preliminary Report (2020 09:04):    No growth at 12 hours      COVID-19 PCR: Detected (10 Apr 2020 22:47)      RADIOLOGY & ADDITIONAL TESTS:  Imaging from Last 24 Hours:    Electrocardiogram/QTc Interval:    COORDINATION OF CARE:  Care Discussed with Consultants/Other Providers: Medicine Progress Note    Patient is a 84y old  Female who presents with a chief complaint of SOB fevers (2020 01:43)      SUBJECTIVE / OVERNIGHT EVENTS: admitted with cough and decreased appetite, Second COVID sample positive 4/10. on 2L NC overnight     This morning patient resting comfortably in bed. NC off patients face. Denies SOB. Patient reports 3-4 day hx of fatigue and decrease appetite. Denies Headache, blurred vision, CP,  abdominal pain or nausea this morning. Admits to cough and clear sputum chronically.     ADDITIONAL REVIEW OF SYSTEMS:    MEDICATIONS  (STANDING):  apixaban 2.5 milliGRAM(s) Oral every 12 hours  atorvastatin 40 milliGRAM(s) Oral at bedtime  azithromycin   Tablet 250 milliGRAM(s) Oral every 24 hours  budesonide 160 MICROgram(s)/formoterol 4.5 MICROgram(s) Inhaler 2 Puff(s) Inhalation two times a day  digoxin     Tablet 0.25 milliGRAM(s) Oral daily  hydroxychloroquine 400 milliGRAM(s) Oral every 24 hours  levothyroxine 75 MICROGram(s) Oral daily  losartan 50 milliGRAM(s) Oral daily  montelukast 10 milliGRAM(s) Oral <User Schedule>  piperacillin/tazobactam IVPB.. 3.375 Gram(s) IV Intermittent every 6 hours  sodium chloride 0.9% Bolus 500 milliLiter(s) IV Bolus once    MEDICATIONS  (PRN):  acetaminophen   Tablet .. 650 milliGRAM(s) Oral every 6 hours PRN Temp greater or equal to 38C (100.4F), Mild Pain (1 - 3)  ALBUTerol    90 MICROgram(s) HFA Inhaler 2 Puff(s) Inhalation every 4 hours PRN Shortness of Breath and/or Wheezing    CAPILLARY BLOOD GLUCOSE        I&O's Summary      PHYSICAL EXAM:  Vital Signs Last 24 Hrs  T(C): 36.4 (2020 06:06), Max: 38.4 (10 Apr 2020 17:35)  T(F): 97.6 (2020 06:06), Max: 101.1 (10 Apr 2020 17:35)  HR: 76 (2020 06:06) (76 - 125)  BP: 115/79 (2020 06:06) (112/59 - 126/62)  BP(mean): --  RR: 18 (2020 06:06) (18 - 24)  SpO2: 96% (2020 06:06) (87% - 97%)  CONSTITUTIONAL: NAD, well-developed, well-groomed  ENMT: Moist oral mucosa,   RESPIRATORY: Normal respiratory effort;   CARDIOVASCULAR: Regular rate and rhythm,  ABDOMEN: Nontender to palpation, no rebound/guarding; No hepatosplenomegaly  PSYCH: A+O to person, place, and time; affect appropriate  NEUROLOGY: CN 2-12 are intact and symmetric; no gross sensory deficits   SKIN: No rashes; no palpable lesions    LABS:                        13.0   6.66  )-----------( 208      ( 2020 09:44 )             39.7     04-11    126<L>  |  90<L>  |  15  ----------------------------<  85  4.2   |  26  |  0.79    Ca    8.7      2020 09:44  Phos  3.0     04-11  Mg     1.9     04-11    TPro  5.5<L>  /  Alb  3.1<L>  /  TBili  0.5  /  DBili  x   /  AST  23  /  ALT  6<L>  /  AlkPhos  38<L>  04-11    PT/INR - ( 10 Apr 2020 18:19 )   PT: 16.2 sec;   INR: 1.41          PTT - ( 10 Apr 2020 18:19 )  PTT:35.3 sec  CARDIAC MARKERS ( 10 Apr 2020 18:19 )  x     / <0.01 ng/mL / 117 U/L / x     / x          Urinalysis Basic - ( 10 Apr 2020 20:41 )    Color: Yellow / Appearance: Clear / S.025 / pH: x  Gluc: x / Ketone: Trace mg/dL  / Bili: Negative / Urobili: 0.2 E.U./dL   Blood: x / Protein: 30 mg/dL / Nitrite: NEGATIVE   Leuk Esterase: NEGATIVE / RBC: 5-10 /HPF / WBC < 5 /HPF   Sq Epi: x / Non Sq Epi: 0-5 /HPF / Bacteria: Present /HPF        Culture - Urine (collected 10 Apr 2020 22:01)  Source: .Urine Clean Catch (Midstream)  Preliminary Report (2020 07:53):    No growth to date    Culture - Blood (collected 10 Apr 2020 20:15)  Source: .Blood Blood-Peripheral  Preliminary Report (2020 09:04):    No growth at 12 hours    Culture - Blood (collected 10 Apr 2020 20:15)  Source: .Blood Blood-Peripheral  Preliminary Report (2020 09:04):    No growth at 12 hours      COVID-19 PCR: Detected (10 Apr 2020 22:47)      RADIOLOGY & ADDITIONAL TESTS:  Imaging from Last 24 Hours:    Electrocardiogram/QTc Interval:    COORDINATION OF CARE:  Care Discussed with Consultants/Other Providers: Medicine Progress Note    Patient is a 84y old  Female who presents with a chief complaint of SOB fevers (2020 01:43)      SUBJECTIVE / OVERNIGHT EVENTS: admitted with cough and decreased appetite, Second COVID sample positive 4/10. on 2L NC overnight     This morning patient resting comfortably in bed. NC off patients face. Denies SOB. Patient reports 3-4 day hx of fatigue and decrease appetite. Denies Headache, blurred vision, CP,  abdominal pain or nausea this morning. Admits to cough and clear sputum chronically.     ADDITIONAL REVIEW OF SYSTEMS: 12 point ROS otherwise negative     MEDICATIONS  (STANDING):  apixaban 2.5 milliGRAM(s) Oral every 12 hours  atorvastatin 40 milliGRAM(s) Oral at bedtime  azithromycin   Tablet 250 milliGRAM(s) Oral every 24 hours  budesonide 160 MICROgram(s)/formoterol 4.5 MICROgram(s) Inhaler 2 Puff(s) Inhalation two times a day  digoxin     Tablet 0.25 milliGRAM(s) Oral daily  hydroxychloroquine 400 milliGRAM(s) Oral every 24 hours  levothyroxine 75 MICROGram(s) Oral daily  losartan 50 milliGRAM(s) Oral daily  montelukast 10 milliGRAM(s) Oral <User Schedule>  piperacillin/tazobactam IVPB.. 3.375 Gram(s) IV Intermittent every 6 hours  sodium chloride 0.9% Bolus 500 milliLiter(s) IV Bolus once    MEDICATIONS  (PRN):  acetaminophen   Tablet .. 650 milliGRAM(s) Oral every 6 hours PRN Temp greater or equal to 38C (100.4F), Mild Pain (1 - 3)  ALBUTerol    90 MICROgram(s) HFA Inhaler 2 Puff(s) Inhalation every 4 hours PRN Shortness of Breath and/or Wheezing    CAPILLARY BLOOD GLUCOSE        I&O's Summary      PHYSICAL EXAM:  Vital Signs Last 24 Hrs  T(C): 36.4 (2020 06:06), Max: 38.4 (10 Apr 2020 17:35)  T(F): 97.6 (2020 06:06), Max: 101.1 (10 Apr 2020 17:35)  HR: 76 (2020 06:06) (76 - 125)  BP: 115/79 (2020 06:06) (112/59 - 126/62)  BP(mean): --  RR: 18 (2020 06:06) (18 - 24)  SpO2: 96% (2020 06:06) (87% - 97%)  CONSTITUTIONAL: NAD, well-developed, well-groomed  ENMT: Moist oral mucosa,   RESPIRATORY: Normal respiratory effort;   CARDIOVASCULAR: Regular rate and rhythm,  ABDOMEN: Nontender to palpation, no rebound/guarding; No hepatosplenomegaly  PSYCH: A+O to person, place, and time; affect appropriate  NEUROLOGY: CN 2-12 are intact and symmetric; no gross sensory deficits   SKIN: No rashes; no palpable lesions    LABS:                        13.0   6.66  )-----------( 208      ( 2020 09:44 )             39.7     04-11    126<L>  |  90<L>  |  15  ----------------------------<  85  4.2   |  26  |  0.79    Ca    8.7      2020 09:44  Phos  3.0     04-11  Mg     1.9     04-11    TPro  5.5<L>  /  Alb  3.1<L>  /  TBili  0.5  /  DBili  x   /  AST  23  /  ALT  6<L>  /  AlkPhos  38<L>  04-11    PT/INR - ( 10 Apr 2020 18:19 )   PT: 16.2 sec;   INR: 1.41          PTT - ( 10 Apr 2020 18:19 )  PTT:35.3 sec  CARDIAC MARKERS ( 10 Apr 2020 18:19 )  x     / <0.01 ng/mL / 117 U/L / x     / x          Urinalysis Basic - ( 10 Apr 2020 20:41 )    Color: Yellow / Appearance: Clear / S.025 / pH: x  Gluc: x / Ketone: Trace mg/dL  / Bili: Negative / Urobili: 0.2 E.U./dL   Blood: x / Protein: 30 mg/dL / Nitrite: NEGATIVE   Leuk Esterase: NEGATIVE / RBC: 5-10 /HPF / WBC < 5 /HPF   Sq Epi: x / Non Sq Epi: 0-5 /HPF / Bacteria: Present /HPF        Culture - Urine (collected 10 Apr 2020 22:01)  Source: .Urine Clean Catch (Midstream)  Preliminary Report (2020 07:53):    No growth to date    Culture - Blood (collected 10 Apr 2020 20:15)  Source: .Blood Blood-Peripheral  Preliminary Report (2020 09:04):    No growth at 12 hours    Culture - Blood (collected 10 Apr 2020 20:15)  Source: .Blood Blood-Peripheral  Preliminary Report (2020 09:04):    No growth at 12 hours      COVID-19 PCR: Detected (10 Apr 2020 22:47)      RADIOLOGY & ADDITIONAL TESTS:  Imaging from Last 24 Hours:    Electrocardiogram/QTc Interval:    COORDINATION OF CARE:  Care Discussed with Consultants/Other Providers:

## 2020-04-11 NOTE — PROGRESS NOTE ADULT - PROBLEM SELECTOR PLAN 5
-c/w eliquis 2.5 mg BID and digoxin 0.25 mg QD ) am Dig level 1.1 4/11  -will check digoxin level in the morning  -rate controlled -likely in setting of dehydration  -s/p 500 cc NS in ED   -4/11 Na 126, will give 500cc NS and repeat pm bmp    #Hypothyroidism  -c/w synthroid 75 micrograms per day

## 2020-04-11 NOTE — PROGRESS NOTE ADULT - PROBLEM SELECTOR PROBLEM 1
R/O Sars-associated coronavirus as the cause of diseases classified elsewhere Pneumonia due to 2019 novel coronavirus

## 2020-04-11 NOTE — H&P ADULT - PROBLEM SELECTOR PLAN 3
-patient w/ home inhalers not on formulary, will give symbicort BID for now and proair prn sob or wheezing.

## 2020-04-11 NOTE — PROGRESS NOTE ADULT - PROBLEM SELECTOR PLAN 3
-patient w/ home inhalers not on formulary, will give symbicort BID for now and proair prn sob or wheezing. as above.

## 2020-04-11 NOTE — H&P ADULT - PROBLEM SELECTOR PLAN 7
-patient takes candesartan 31 mg QD at home, states this was cut in half recently  -therapeutic interchange is losartan 50 mg

## 2020-04-11 NOTE — H&P ADULT - PROBLEM SELECTOR PLAN 4
-likely in setting of dehydration  -s/p 500 cc NS, will recheck BMP in the morning  -f/u urine legionella    #Hypothyroidism  -c/w synthroid 75 micrograms per day  -f/u TSH level

## 2020-04-11 NOTE — H&P ADULT - PROBLEM SELECTOR PLAN 5
-c/w eliquis 2.5 mg BID and digoxin 0.25 mg QD  -will check digoxin level in the morning  -rate was elevated likely in setting of fever and dehydration, s/p tylenol and 500 cc NS

## 2020-04-11 NOTE — H&P ADULT - NSHPPHYSICALEXAM_GEN_ALL_CORE
PHYSICAL EXAM:  GENERAL: NAD, well-developed  HEAD:  Atraumatic, Normocephalic  EYES: EOMI, PERRLA, conjunctiva and sclera clear  NECK: Supple, No JVD  CHEST/LUNG: mild rhonchi bilateral lower lobes, mild end expiratory wheezes  HEART: irregular rate and rhythm  ABDOMEN: Soft, Nontender, Nondistended; Bowel sounds present  EXTREMITIES:  2+ Peripheral Pulses, No clubbing, cyanosis, or edema  PSYCH: AAOx3  NEUROLOGY: non-focal  SKIN: No rashes or lesions

## 2020-04-11 NOTE — PROGRESS NOTE ADULT - PROBLEM SELECTOR PLAN 4
-likely in setting of dehydration  -s/p 500 cc NS in ED   -4/11 Na 126, will give 500cc NS and repeat pm bmp    #Hypothyroidism  -c/w synthroid 75 micrograms per day -patient w/ home inhalers not on formulary, will give symbicort BID for now and proair prn sob or wheezing.

## 2020-04-11 NOTE — H&P ADULT - ASSESSMENT
84 year old female with history of afib on eliquis and digoxin, stroke 1 week ago cerebral and abdoul region, COPD not requiring home oxygen, and pseudomonus in sputum who presents to the emergency room with sob and fevers covid r/o

## 2020-04-11 NOTE — PROGRESS NOTE ADULT - PROBLEM SELECTOR PLAN 7
-patient takes candesartan 31 mg QD at home, states this was cut in half recently  -therapeutic interchange is losartan 50 mg -per daughter patient with CVA cerebral/abdoul region last week, no residual deficits (was told it was a small stroke)  -c/w eliquis as above, and lipitor 40 mg QD

## 2020-04-11 NOTE — H&P ADULT - PROBLEM SELECTOR PLAN 2
-patient with history of pseudomonas in sputum, continue with zosyn due to sepsis as above.  -if procalcitonin levels remain low, and repeat COVID-19 test is positive, can consider discontinuing zosyn  -f/u urine legionella

## 2020-04-11 NOTE — PHYSICAL THERAPY INITIAL EVALUATION ADULT - ADDITIONAL COMMENTS
Pt lives with her  in an elevator access apt. At baseline, ambulates with a SC however stated she began using a RW last week after a fall. Pt's  is currently admitted to [facility] as well.

## 2020-04-11 NOTE — H&P ADULT - NSICDXPASTSURGICALHX_GEN_ALL_CORE_FT
Pt's daughter, Rowena called. No answer. No voicemail available to leave a message. Will call back later.   PAST SURGICAL HISTORY:  S/P hip replacement, right 2010

## 2020-04-11 NOTE — H&P ADULT - HISTORY OF PRESENT ILLNESS
Patient is an 84 year old female with history of afib on eliquis and digoxin, stroke 1 week ago cerebral and abdoul region, COPD not requiring home oxygen, and pseudomonus in sputum who presents to the emergency room. Home doctor noted patient had temperature of 103 and was 88% on room air. Patient states she does not feel a fever, but does endorse chills. Denies feeling short of breath, and states is not having a COPD exacerbation. Her  is admitted to the hospital because of COVID-19. States having a mechanical fall on her right side a few days prior, but no pain currently. Patient denies diarrhea, nausea, vomiting, or chest pain.   ED course: s/p azithro 500 mg iv and plaquenil 800 mg times 1.   Vitals: Tm of 38.4 C, , /77, RR 97% on 2 L NC, HR to 80-90 after tylenol  CT chest noncontrast: pulmonary emphysema. Evidence of bronchitis and small airway disease. Subpleural opacities in both lower lobes   CT head noncontrast: without acute findings, advanced small vessel ischemic change.  EKG with Qtc 409, atrial fibrillation  lymphopenia noted, Na of 126, and COVID-19 negative times 1

## 2020-04-11 NOTE — H&P ADULT - NSICDXPASTMEDICALHX_GEN_ALL_CORE_FT
PAST MEDICAL HISTORY:  Asthma     Atrial fibrillation     Bronchitis     Cerebrovascular accident (CVA)     COPD (chronic obstructive pulmonary disease)

## 2020-04-11 NOTE — PROGRESS NOTE ADULT - PROBLEM SELECTOR PLAN 6
-per daughter patient with CVA cerebral/abdoul region last week, no residual deficits (was told it was a small stroke)  -c/w eliquis as above, and lipitor 40 mg QD -c/w eliquis 2.5 mg BID and digoxin 0.25 mg QD ) am Dig level 1.1 4/11  c/w current therapy

## 2020-04-11 NOTE — PROGRESS NOTE ADULT - PROBLEM SELECTOR PLAN 8
F-s/p 500 cc NS  E-replete K<4 and Mag <2  N-DASH/TLC  eliquis for afib   FULL code -patient takes candesartan 31 mg QD at home, states this was cut in half recently  -therapeutic interchange is losartan 50 mg

## 2020-04-12 LAB
ALBUMIN SERPL ELPH-MCNC: 3.2 G/DL — LOW (ref 3.3–5)
ALP SERPL-CCNC: 44 U/L — SIGNIFICANT CHANGE UP (ref 40–120)
ALT FLD-CCNC: 8 U/L — LOW (ref 10–45)
ANION GAP SERPL CALC-SCNC: 7 MMOL/L — SIGNIFICANT CHANGE UP (ref 5–17)
AST SERPL-CCNC: 26 U/L — SIGNIFICANT CHANGE UP (ref 10–40)
BASOPHILS # BLD AUTO: 0.01 K/UL — SIGNIFICANT CHANGE UP (ref 0–0.2)
BASOPHILS NFR BLD AUTO: 0.1 % — SIGNIFICANT CHANGE UP (ref 0–2)
BILIRUB SERPL-MCNC: 0.5 MG/DL — SIGNIFICANT CHANGE UP (ref 0.2–1.2)
BUN SERPL-MCNC: 10 MG/DL — SIGNIFICANT CHANGE UP (ref 7–23)
CALCIUM SERPL-MCNC: 9.2 MG/DL — SIGNIFICANT CHANGE UP (ref 8.4–10.5)
CHLORIDE SERPL-SCNC: 99 MMOL/L — SIGNIFICANT CHANGE UP (ref 96–108)
CO2 SERPL-SCNC: 25 MMOL/L — SIGNIFICANT CHANGE UP (ref 22–31)
CREAT SERPL-MCNC: 0.81 MG/DL — SIGNIFICANT CHANGE UP (ref 0.5–1.3)
CRP SERPL-MCNC: 7.83 MG/DL — HIGH (ref 0–0.4)
CULTURE RESULTS: SIGNIFICANT CHANGE UP
D DIMER BLD IA.RAPID-MCNC: <150 NG/ML DDU — SIGNIFICANT CHANGE UP
EOSINOPHIL # BLD AUTO: 0.18 K/UL — SIGNIFICANT CHANGE UP (ref 0–0.5)
EOSINOPHIL NFR BLD AUTO: 2.5 % — SIGNIFICANT CHANGE UP (ref 0–6)
FERRITIN SERPL-MCNC: 248 NG/ML — HIGH (ref 15–150)
G6PD RBC-CCNC: 13.5 U/G HGB — SIGNIFICANT CHANGE UP (ref 7–20.5)
GLUCOSE SERPL-MCNC: 89 MG/DL — SIGNIFICANT CHANGE UP (ref 70–99)
HCT VFR BLD CALC: 40.7 % — SIGNIFICANT CHANGE UP (ref 34.5–45)
HGB BLD-MCNC: 13.7 G/DL — SIGNIFICANT CHANGE UP (ref 11.5–15.5)
IMM GRANULOCYTES NFR BLD AUTO: 1.1 % — SIGNIFICANT CHANGE UP (ref 0–1.5)
LYMPHOCYTES # BLD AUTO: 1.07 K/UL — SIGNIFICANT CHANGE UP (ref 1–3.3)
LYMPHOCYTES # BLD AUTO: 15.1 % — SIGNIFICANT CHANGE UP (ref 13–44)
MAGNESIUM SERPL-MCNC: 1.8 MG/DL — SIGNIFICANT CHANGE UP (ref 1.6–2.6)
MCHC RBC-ENTMCNC: 32.5 PG — SIGNIFICANT CHANGE UP (ref 27–34)
MCHC RBC-ENTMCNC: 33.7 GM/DL — SIGNIFICANT CHANGE UP (ref 32–36)
MCV RBC AUTO: 96.7 FL — SIGNIFICANT CHANGE UP (ref 80–100)
MONOCYTES # BLD AUTO: 0.72 K/UL — SIGNIFICANT CHANGE UP (ref 0–0.9)
MONOCYTES NFR BLD AUTO: 10.2 % — SIGNIFICANT CHANGE UP (ref 2–14)
NEUTROPHILS # BLD AUTO: 5.03 K/UL — SIGNIFICANT CHANGE UP (ref 1.8–7.4)
NEUTROPHILS NFR BLD AUTO: 71 % — SIGNIFICANT CHANGE UP (ref 43–77)
NRBC # BLD: 0 /100 WBCS — SIGNIFICANT CHANGE UP (ref 0–0)
PHOSPHATE SERPL-MCNC: 3.1 MG/DL — SIGNIFICANT CHANGE UP (ref 2.5–4.5)
PLATELET # BLD AUTO: 203 K/UL — SIGNIFICANT CHANGE UP (ref 150–400)
POTASSIUM SERPL-MCNC: 4.4 MMOL/L — SIGNIFICANT CHANGE UP (ref 3.5–5.3)
POTASSIUM SERPL-SCNC: 4.4 MMOL/L — SIGNIFICANT CHANGE UP (ref 3.5–5.3)
PROCALCITONIN SERPL-MCNC: 0.11 NG/ML — HIGH (ref 0.02–0.1)
PROT SERPL-MCNC: 6.1 G/DL — SIGNIFICANT CHANGE UP (ref 6–8.3)
RBC # BLD: 4.21 M/UL — SIGNIFICANT CHANGE UP (ref 3.8–5.2)
RBC # FLD: 13.9 % — SIGNIFICANT CHANGE UP (ref 10.3–14.5)
SODIUM SERPL-SCNC: 131 MMOL/L — LOW (ref 135–145)
SPECIMEN SOURCE: SIGNIFICANT CHANGE UP
WBC # BLD: 7.09 K/UL — SIGNIFICANT CHANGE UP (ref 3.8–10.5)
WBC # FLD AUTO: 7.09 K/UL — SIGNIFICANT CHANGE UP (ref 3.8–10.5)

## 2020-04-12 PROCEDURE — 99233 SBSQ HOSP IP/OBS HIGH 50: CPT | Mod: GC

## 2020-04-12 RX ADMIN — AZITHROMYCIN 250 MILLIGRAM(S): 500 TABLET, FILM COATED ORAL at 22:12

## 2020-04-12 RX ADMIN — Medication 400 MILLIGRAM(S): at 22:12

## 2020-04-12 RX ADMIN — PIPERACILLIN AND TAZOBACTAM 200 GRAM(S): 4; .5 INJECTION, POWDER, LYOPHILIZED, FOR SOLUTION INTRAVENOUS at 17:45

## 2020-04-12 RX ADMIN — Medication 75 MICROGRAM(S): at 05:45

## 2020-04-12 RX ADMIN — Medication 0.25 MILLIGRAM(S): at 05:45

## 2020-04-12 RX ADMIN — LOSARTAN POTASSIUM 50 MILLIGRAM(S): 100 TABLET, FILM COATED ORAL at 05:45

## 2020-04-12 RX ADMIN — APIXABAN 2.5 MILLIGRAM(S): 2.5 TABLET, FILM COATED ORAL at 08:59

## 2020-04-12 RX ADMIN — Medication 650 MILLIGRAM(S): at 22:13

## 2020-04-12 RX ADMIN — PIPERACILLIN AND TAZOBACTAM 200 GRAM(S): 4; .5 INJECTION, POWDER, LYOPHILIZED, FOR SOLUTION INTRAVENOUS at 02:06

## 2020-04-12 RX ADMIN — APIXABAN 2.5 MILLIGRAM(S): 2.5 TABLET, FILM COATED ORAL at 22:12

## 2020-04-12 RX ADMIN — PIPERACILLIN AND TAZOBACTAM 200 GRAM(S): 4; .5 INJECTION, POWDER, LYOPHILIZED, FOR SOLUTION INTRAVENOUS at 22:12

## 2020-04-12 RX ADMIN — PIPERACILLIN AND TAZOBACTAM 200 GRAM(S): 4; .5 INJECTION, POWDER, LYOPHILIZED, FOR SOLUTION INTRAVENOUS at 08:59

## 2020-04-12 RX ADMIN — ATORVASTATIN CALCIUM 40 MILLIGRAM(S): 80 TABLET, FILM COATED ORAL at 22:14

## 2020-04-12 RX ADMIN — BUDESONIDE AND FORMOTEROL FUMARATE DIHYDRATE 2 PUFF(S): 160; 4.5 AEROSOL RESPIRATORY (INHALATION) at 22:11

## 2020-04-12 RX ADMIN — BUDESONIDE AND FORMOTEROL FUMARATE DIHYDRATE 2 PUFF(S): 160; 4.5 AEROSOL RESPIRATORY (INHALATION) at 08:42

## 2020-04-12 RX ADMIN — FAMOTIDINE 20 MILLIGRAM(S): 10 INJECTION INTRAVENOUS at 09:00

## 2020-04-12 RX ADMIN — MONTELUKAST 10 MILLIGRAM(S): 4 TABLET, CHEWABLE ORAL at 22:14

## 2020-04-12 RX ADMIN — MONTELUKAST 10 MILLIGRAM(S): 4 TABLET, CHEWABLE ORAL at 08:59

## 2020-04-12 NOTE — PROGRESS NOTE ADULT - PROBLEM SELECTOR PLAN 4
-patient w/ home inhalers not on formulary, patient w/ symbicort BID for now and proair prn sob or wheezing.

## 2020-04-12 NOTE — PROGRESS NOTE ADULT - PROBLEM SELECTOR PLAN 2
-patient with history of pseudomonas in sputum,  -Procal 0.16, will trend daily   - urine Cx negative  - as per outpatient pulmonologist given patient has some secretions patient was started on zosyn as bacterial superinfection cant be ruled out -patient with history of pseudomonas in sputum,  -Procal 0.11, will trend daily   - urine Cx negative  - as per outpatient pulmonologist given patient has some secretions patient was started on zosyn as bacterial superinfection can't be ruled out -patient with history of pseudomonas in sputum,  -Procal low will trend daily   - urine Cx negative  - as per outpatient pulmonologist given patient has some secretions patient was started on zosyn as bacterial superinfection can't be ruled out

## 2020-04-12 NOTE — PROGRESS NOTE ADULT - SUBJECTIVE AND OBJECTIVE BOX
OVERNIGHT EVENTS:    SUBJECTIVE / INTERVAL HPI: Patient seen and examined at bedside. Denies f/c, n/v, HA, chest pain, SOB, abdominal pain, diarrhea, constipation, melena, hematochezia, hematuria, dysuria    MEDICATIONS  (STANDING):  apixaban 2.5 milliGRAM(s) Oral every 12 hours  atorvastatin 40 milliGRAM(s) Oral at bedtime  azithromycin   Tablet 250 milliGRAM(s) Oral every 24 hours  budesonide 160 MICROgram(s)/formoterol 4.5 MICROgram(s) Inhaler 2 Puff(s) Inhalation two times a day  digoxin     Tablet 0.25 milliGRAM(s) Oral daily  famotidine    Tablet 20 milliGRAM(s) Oral daily  hydroxychloroquine 400 milliGRAM(s) Oral every 24 hours  levothyroxine 75 MICROGram(s) Oral daily  losartan 50 milliGRAM(s) Oral daily  montelukast 10 milliGRAM(s) Oral <User Schedule>  piperacillin/tazobactam IVPB.. 3.375 Gram(s) IV Intermittent every 6 hours    MEDICATIONS  (PRN):  acetaminophen   Tablet .. 650 milliGRAM(s) Oral every 6 hours PRN Temp greater or equal to 38C (100.4F), Mild Pain (1 - 3)  ALBUTerol    90 MICROgram(s) HFA Inhaler 2 Puff(s) Inhalation every 4 hours PRN Shortness of Breath and/or Wheezing    Allergies    Mustard (Anaphylaxis)  No Known Drug Allergies    Intolerances        VITAL SIGNS:  Vital Signs Last 24 Hrs  T(C): 36.6 (2020 05:49), Max: 38.2 (2020 22:00)  T(F): 97.8 (2020 05:49), Max: 100.7 (2020 22:00)  HR: 86 (2020 05:49) (86 - 94)  BP: 116/80 (2020 05:49) (107/68 - 130/84)  BP(mean): --  RR: 18 (2020 05:49) (18 - 20)  SpO2: 92% (2020 05:49) (92% - 94%)        PHYSICAL EXAM:  General: NAD, Laying comfortably in bed, pleasant  HEENT: NC/AT, anicteric sclera, MMM  Neck: supple  Cardiovascular: +S1/S2, RRR, No murmurs, rubs, gallops  Respiratory: CTA B/L, no W/R/R  Gastrointestinal: soft, NT/ND, +BSx4  Extremities: WWP, no edema, clubbing or cyanosis  Vascular: 2+ radial, DP/PT pulses B/L  Neurological: AAOx3, no focal deficits      LABS:                        13.0   6.66  )-----------( 208      ( 2020 09:44 )             39.7     04-11    129<L>  |  97  |  14  ----------------------------<  98  4.3   |  28  |  0.72    Ca    8.9      2020 17:23  Phos  3.0     04-11  Mg     1.9     04-11    TPro  5.3<L>  /  Alb  3.1<L>  /  TBili  0.4  /  DBili  x   /  AST  25  /  ALT  9<L>  /  AlkPhos  40  04-11    PT/INR - ( 10 Apr 2020 18:19 )   PT: 16.2 sec;   INR: 1.41          PTT - ( 10 Apr 2020 18:19 )  PTT:35.3 sec  Urinalysis Basic - ( 10 Apr 2020 20:41 )    Color: Yellow / Appearance: Clear / S.025 / pH: x  Gluc: x / Ketone: Trace mg/dL  / Bili: Negative / Urobili: 0.2 E.U./dL   Blood: x / Protein: 30 mg/dL / Nitrite: NEGATIVE   Leuk Esterase: NEGATIVE / RBC: 5-10 /HPF / WBC < 5 /HPF   Sq Epi: x / Non Sq Epi: 0-5 /HPF / Bacteria: Present /HPF      CAPILLARY BLOOD GLUCOSE              RADIOLOGY & ADDITIONAL TESTS: Reviewed. OVERNIGHT EVENTS: none    SUBJECTIVE / INTERVAL HPI: Patient seen and examined at bedside. Denies f/c, n/v, HA, chest pain, SOB, abdominal pain, diarrhea, constipation, melena, hematochezia, hematuria, dysuria    MEDICATIONS  (STANDING):  apixaban 2.5 milliGRAM(s) Oral every 12 hours  atorvastatin 40 milliGRAM(s) Oral at bedtime  azithromycin   Tablet 250 milliGRAM(s) Oral every 24 hours  budesonide 160 MICROgram(s)/formoterol 4.5 MICROgram(s) Inhaler 2 Puff(s) Inhalation two times a day  digoxin     Tablet 0.25 milliGRAM(s) Oral daily  famotidine    Tablet 20 milliGRAM(s) Oral daily  hydroxychloroquine 400 milliGRAM(s) Oral every 24 hours  levothyroxine 75 MICROGram(s) Oral daily  losartan 50 milliGRAM(s) Oral daily  montelukast 10 milliGRAM(s) Oral <User Schedule>  piperacillin/tazobactam IVPB.. 3.375 Gram(s) IV Intermittent every 6 hours    MEDICATIONS  (PRN):  acetaminophen   Tablet .. 650 milliGRAM(s) Oral every 6 hours PRN Temp greater or equal to 38C (100.4F), Mild Pain (1 - 3)  ALBUTerol    90 MICROgram(s) HFA Inhaler 2 Puff(s) Inhalation every 4 hours PRN Shortness of Breath and/or Wheezing    Allergies    Mustard (Anaphylaxis)  No Known Drug Allergies    Intolerances        VITAL SIGNS:  Vital Signs Last 24 Hrs  T(C): 36.6 (2020 05:49), Max: 38.2 (2020 22:00)  T(F): 97.8 (2020 05:49), Max: 100.7 (2020 22:00)  HR: 86 (2020 05:49) (86 - 94)  BP: 116/80 (2020 05:49) (107/68 - 130/84)  BP(mean): --  RR: 18 (2020 05:49) (18 - 20)  SpO2: 92% (2020 05:49) (92% - 94%)        PHYSICAL EXAM:  General: NAD, Laying comfortably in bed, pleasant  HEENT: NC/AT, anicteric sclera, MMM  Neck: supple  Cardiovascular: +S1/S2, RRR, No murmurs, rubs, gallops  Respiratory: CTA B/L, no W/R/R  Gastrointestinal: soft, NT/ND, +BSx4  Extremities: WWP, no edema, clubbing or cyanosis  Vascular: 2+ radial, DP/PT pulses B/L  Neurological: AAOx3, no focal deficits      LABS:                        13.0   6.66  )-----------( 208      ( 2020 09:44 )             39.7     04-11    129<L>  |  97  |  14  ----------------------------<  98  4.3   |  28  |  0.72    Ca    8.9      2020 17:23  Phos  3.0     04-11  Mg     1.9     04-11    TPro  5.3<L>  /  Alb  3.1<L>  /  TBili  0.4  /  DBili  x   /  AST  25  /  ALT  9<L>  /  AlkPhos  40  04-11    PT/INR - ( 10 Apr 2020 18:19 )   PT: 16.2 sec;   INR: 1.41          PTT - ( 10 Apr 2020 18:19 )  PTT:35.3 sec  Urinalysis Basic - ( 10 Apr 2020 20:41 )    Color: Yellow / Appearance: Clear / S.025 / pH: x  Gluc: x / Ketone: Trace mg/dL  / Bili: Negative / Urobili: 0.2 E.U./dL   Blood: x / Protein: 30 mg/dL / Nitrite: NEGATIVE   Leuk Esterase: NEGATIVE / RBC: 5-10 /HPF / WBC < 5 /HPF   Sq Epi: x / Non Sq Epi: 0-5 /HPF / Bacteria: Present /HPF      CAPILLARY BLOOD GLUCOSE              RADIOLOGY & ADDITIONAL TESTS: Reviewed. OVERNIGHT EVENTS: n/a    SUBJECTIVE / INTERVAL HPI: Patient seen and examined at bedside. Denies f/c, n/v, HA, chest pain, SOB, abdominal pain, diarrhea, constipation, melena, hematochezia, hematuria, dysuria    MEDICATIONS  (STANDING):  apixaban 2.5 milliGRAM(s) Oral every 12 hours  atorvastatin 40 milliGRAM(s) Oral at bedtime  azithromycin   Tablet 250 milliGRAM(s) Oral every 24 hours  budesonide 160 MICROgram(s)/formoterol 4.5 MICROgram(s) Inhaler 2 Puff(s) Inhalation two times a day  digoxin     Tablet 0.25 milliGRAM(s) Oral daily  famotidine    Tablet 20 milliGRAM(s) Oral daily  hydroxychloroquine 400 milliGRAM(s) Oral every 24 hours  levothyroxine 75 MICROGram(s) Oral daily  losartan 50 milliGRAM(s) Oral daily  montelukast 10 milliGRAM(s) Oral <User Schedule>  piperacillin/tazobactam IVPB.. 3.375 Gram(s) IV Intermittent every 6 hours    MEDICATIONS  (PRN):  acetaminophen   Tablet .. 650 milliGRAM(s) Oral every 6 hours PRN Temp greater or equal to 38C (100.4F), Mild Pain (1 - 3)  ALBUTerol    90 MICROgram(s) HFA Inhaler 2 Puff(s) Inhalation every 4 hours PRN Shortness of Breath and/or Wheezing    Allergies    Mustard (Anaphylaxis)  No Known Drug Allergies    Intolerances        VITAL SIGNS:  Vital Signs Last 24 Hrs  T(C): 36.6 (2020 05:49), Max: 38.2 (2020 22:00)  T(F): 97.8 (2020 05:49), Max: 100.7 (2020 22:00)  HR: 86 (2020 05:49) (86 - 94)  BP: 116/80 (2020 05:49) (116/80 - 130/84)  BP(mean): --  RR: 18 (2020 05:49) (18 - 20)  SpO2: 92% (2020 05:49) (92% - 94%)        PHYSICAL EXAM:  General: NAD, Laying comfortably in bed  HEENT: NC/AT, anicteric sclera, MMM  Neck: supple  Cardiovascular: +S1/S2, RRR, No murmurs, rubs, gallops  Respiratory: CTA B/L, no W/R/R  Gastrointestinal: soft, NT/ND, +BSx4  Extremities: WWP, no edema, clubbing or cyanosis  Vascular: 2+ radial, DP/PT pulses B/L  Neurological: AAOx3, no focal deficits      LABS:                        13.7   7.09  )-----------( 203      ( 2020 09:34 )             40.7     04-12    131<L>  |  99  |  10  ----------------------------<  89  4.4   |  25  |  0.81    Ca    9.2      2020 09:34  Phos  3.1     04-12  Mg     1.8     04-12    TPro  6.1  /  Alb  3.2<L>  /  TBili  0.5  /  DBili  x   /  AST  26  /  ALT  8<L>  /  AlkPhos  44  04-12    PT/INR - ( 10 Apr 2020 18:19 )   PT: 16.2 sec;   INR: 1.41          PTT - ( 10 Apr 2020 18:19 )  PTT:35.3 sec  Urinalysis Basic - ( 10 Apr 2020 20:41 )    Color: Yellow / Appearance: Clear / S.025 / pH: x  Gluc: x / Ketone: Trace mg/dL  / Bili: Negative / Urobili: 0.2 E.U./dL   Blood: x / Protein: 30 mg/dL / Nitrite: NEGATIVE   Leuk Esterase: NEGATIVE / RBC: 5-10 /HPF / WBC < 5 /HPF   Sq Epi: x / Non Sq Epi: 0-5 /HPF / Bacteria: Present /HPF      CAPILLARY BLOOD GLUCOSE              RADIOLOGY & ADDITIONAL TESTS: Reviewed. OVERNIGHT EVENTS: n/a    SUBJECTIVE / INTERVAL HPI: Patient seen and examined at bedside. Denies f/c, n/v, HA, chest pain, SOB, abdominal pain, diarrhea, constipation, melena, hematochezia, hematuria, dysuria  ROS: 12 point systems otherwise negative     MEDICATIONS  (STANDING):  apixaban 2.5 milliGRAM(s) Oral every 12 hours  atorvastatin 40 milliGRAM(s) Oral at bedtime  azithromycin   Tablet 250 milliGRAM(s) Oral every 24 hours  budesonide 160 MICROgram(s)/formoterol 4.5 MICROgram(s) Inhaler 2 Puff(s) Inhalation two times a day  digoxin     Tablet 0.25 milliGRAM(s) Oral daily  famotidine    Tablet 20 milliGRAM(s) Oral daily  hydroxychloroquine 400 milliGRAM(s) Oral every 24 hours  levothyroxine 75 MICROGram(s) Oral daily  losartan 50 milliGRAM(s) Oral daily  montelukast 10 milliGRAM(s) Oral <User Schedule>  piperacillin/tazobactam IVPB.. 3.375 Gram(s) IV Intermittent every 6 hours    MEDICATIONS  (PRN):  acetaminophen   Tablet .. 650 milliGRAM(s) Oral every 6 hours PRN Temp greater or equal to 38C (100.4F), Mild Pain (1 - 3)  ALBUTerol    90 MICROgram(s) HFA Inhaler 2 Puff(s) Inhalation every 4 hours PRN Shortness of Breath and/or Wheezing    Allergies    Mustard (Anaphylaxis)  No Known Drug Allergies    Intolerances        VITAL SIGNS:  Vital Signs Last 24 Hrs  T(C): 36.6 (2020 05:49), Max: 38.2 (2020 22:00)  T(F): 97.8 (2020 05:49), Max: 100.7 (2020 22:00)  HR: 86 (2020 05:49) (86 - 94)  BP: 116/80 (2020 05:49) (116/80 - 130/84)  BP(mean): --  RR: 18 (2020 05:49) (18 - 20)  SpO2: 92% (2020 05:49) (92% - 94%)        PHYSICAL EXAM:  General: NAD, Laying comfortably in bed  HEENT: NC/AT, anicteric sclera, MMM  Neck: supple  Cardiovascular: +S1/S2, RRR, No murmurs, rubs, gallops  Respiratory: CTA B/L, no W/R/R  Gastrointestinal: soft, NT/ND, +BSx4  Extremities: WWP, no edema, clubbing or cyanosis  Vascular: 2+ radial, DP/PT pulses B/L  Neurological: AAOx3, no focal deficits      LABS:                        13.7   7.09  )-----------( 203      ( 2020 09:34 )             40.7     04-12    131<L>  |  99  |  10  ----------------------------<  89  4.4   |  25  |  0.81    Ca    9.2      2020 09:34  Phos  3.1     04-12  Mg     1.8     04-12    TPro  6.1  /  Alb  3.2<L>  /  TBili  0.5  /  DBili  x   /  AST  26  /  ALT  8<L>  /  AlkPhos  44  04-12    PT/INR - ( 10 Apr 2020 18:19 )   PT: 16.2 sec;   INR: 1.41          PTT - ( 10 Apr 2020 18:19 )  PTT:35.3 sec  Urinalysis Basic - ( 10 Apr 2020 20:41 )    Color: Yellow / Appearance: Clear / S.025 / pH: x  Gluc: x / Ketone: Trace mg/dL  / Bili: Negative / Urobili: 0.2 E.U./dL   Blood: x / Protein: 30 mg/dL / Nitrite: NEGATIVE   Leuk Esterase: NEGATIVE / RBC: 5-10 /HPF / WBC < 5 /HPF   Sq Epi: x / Non Sq Epi: 0-5 /HPF / Bacteria: Present /HPF      CAPILLARY BLOOD GLUCOSE              RADIOLOGY & ADDITIONAL TESTS: Reviewed.

## 2020-04-12 NOTE — PROGRESS NOTE ADULT - ASSESSMENT
84 year old female with history of Afib on Eliquis and Digoxin, stroke 1 week ago cerebral and abdoul region, COPD not requiring home oxygen, and pseudomonus in sputum who presents to the emergency room with sob and fevers covid r/o 84 year old female with history of Afib on Eliquis and Digoxin, stroke 1 week ago cerebral and abdoul region, COPD not requiring home oxygen, and pseudomonus in sputum who presents to the emergency room with sob and fevers covid r/o. Patient is COVID +

## 2020-04-12 NOTE — PROGRESS NOTE ADULT - PROBLEM SELECTOR PLAN 5
-likely in setting of dehydration  -s/p 500 cc NS in ED   -4/11 Na 126, will give 500cc NS and repeat pm bmp    #Hypothyroidism  -c/w synthroid 75 micrograms per day -likely in setting of dehydration  -s/p 500 cc NS in ED   -4/12 Na 131, improved from 126    #Hypothyroidism  -c/w synthroid 75 micrograms per day

## 2020-04-13 ENCOUNTER — TRANSCRIPTION ENCOUNTER (OUTPATIENT)
Age: 85
End: 2020-04-13

## 2020-04-13 LAB
ACID FAST STN SPT: NORMAL
ALBUMIN SERPL ELPH-MCNC: 3.1 G/DL — LOW (ref 3.3–5)
ALP SERPL-CCNC: 40 U/L — SIGNIFICANT CHANGE UP (ref 40–120)
ALT FLD-CCNC: 8 U/L — LOW (ref 10–45)
ANION GAP SERPL CALC-SCNC: 8 MMOL/L — SIGNIFICANT CHANGE UP (ref 5–17)
AST SERPL-CCNC: 26 U/L — SIGNIFICANT CHANGE UP (ref 10–40)
BASOPHILS # BLD AUTO: 0.01 K/UL — SIGNIFICANT CHANGE UP (ref 0–0.2)
BASOPHILS NFR BLD AUTO: 0.2 % — SIGNIFICANT CHANGE UP (ref 0–2)
BILIRUB SERPL-MCNC: 0.5 MG/DL — SIGNIFICANT CHANGE UP (ref 0.2–1.2)
BUN SERPL-MCNC: 9 MG/DL — SIGNIFICANT CHANGE UP (ref 7–23)
CALCIUM SERPL-MCNC: 8.9 MG/DL — SIGNIFICANT CHANGE UP (ref 8.4–10.5)
CHLORIDE SERPL-SCNC: 97 MMOL/L — SIGNIFICANT CHANGE UP (ref 96–108)
CO2 SERPL-SCNC: 25 MMOL/L — SIGNIFICANT CHANGE UP (ref 22–31)
CREAT SERPL-MCNC: 0.73 MG/DL — SIGNIFICANT CHANGE UP (ref 0.5–1.3)
CRP SERPL-MCNC: 6.48 MG/DL — HIGH (ref 0–0.4)
D DIMER BLD IA.RAPID-MCNC: <150 NG/ML DDU — SIGNIFICANT CHANGE UP
EOSINOPHIL # BLD AUTO: 0.13 K/UL — SIGNIFICANT CHANGE UP (ref 0–0.5)
EOSINOPHIL NFR BLD AUTO: 2.2 % — SIGNIFICANT CHANGE UP (ref 0–6)
FERRITIN SERPL-MCNC: 230 NG/ML — HIGH (ref 15–150)
GAMMA INTERFERON BACKGROUND BLD IA-ACNC: 0.03 IU/ML — SIGNIFICANT CHANGE UP
GLUCOSE SERPL-MCNC: 88 MG/DL — SIGNIFICANT CHANGE UP (ref 70–99)
HCT VFR BLD CALC: 38 % — SIGNIFICANT CHANGE UP (ref 34.5–45)
HGB BLD-MCNC: 12.9 G/DL — SIGNIFICANT CHANGE UP (ref 11.5–15.5)
IMM GRANULOCYTES NFR BLD AUTO: 0.9 % — SIGNIFICANT CHANGE UP (ref 0–1.5)
LYMPHOCYTES # BLD AUTO: 1.07 K/UL — SIGNIFICANT CHANGE UP (ref 1–3.3)
LYMPHOCYTES # BLD AUTO: 18.2 % — SIGNIFICANT CHANGE UP (ref 13–44)
M TB IFN-G BLD-IMP: NEGATIVE — SIGNIFICANT CHANGE UP
M TB IFN-G CD4+ BCKGRND COR BLD-ACNC: 0.01 IU/ML — SIGNIFICANT CHANGE UP
M TB IFN-G CD4+CD8+ BCKGRND COR BLD-ACNC: 0.01 IU/ML — SIGNIFICANT CHANGE UP
MAGNESIUM SERPL-MCNC: 1.8 MG/DL — SIGNIFICANT CHANGE UP (ref 1.6–2.6)
MCHC RBC-ENTMCNC: 32.7 PG — SIGNIFICANT CHANGE UP (ref 27–34)
MCHC RBC-ENTMCNC: 33.9 GM/DL — SIGNIFICANT CHANGE UP (ref 32–36)
MCV RBC AUTO: 96.2 FL — SIGNIFICANT CHANGE UP (ref 80–100)
MONOCYTES # BLD AUTO: 0.7 K/UL — SIGNIFICANT CHANGE UP (ref 0–0.9)
MONOCYTES NFR BLD AUTO: 11.9 % — SIGNIFICANT CHANGE UP (ref 2–14)
NEUTROPHILS # BLD AUTO: 3.91 K/UL — SIGNIFICANT CHANGE UP (ref 1.8–7.4)
NEUTROPHILS NFR BLD AUTO: 66.6 % — SIGNIFICANT CHANGE UP (ref 43–77)
NRBC # BLD: 0 /100 WBCS — SIGNIFICANT CHANGE UP (ref 0–0)
PLATELET # BLD AUTO: 190 K/UL — SIGNIFICANT CHANGE UP (ref 150–400)
POTASSIUM SERPL-MCNC: 4.1 MMOL/L — SIGNIFICANT CHANGE UP (ref 3.5–5.3)
POTASSIUM SERPL-SCNC: 4.1 MMOL/L — SIGNIFICANT CHANGE UP (ref 3.5–5.3)
PROCALCITONIN SERPL-MCNC: 0.1 NG/ML — SIGNIFICANT CHANGE UP (ref 0.02–0.1)
PROT SERPL-MCNC: 5.6 G/DL — LOW (ref 6–8.3)
QUANT TB PLUS MITOGEN MINUS NIL: 2.73 IU/ML — SIGNIFICANT CHANGE UP
RBC # BLD: 3.95 M/UL — SIGNIFICANT CHANGE UP (ref 3.8–5.2)
RBC # FLD: 13.5 % — SIGNIFICANT CHANGE UP (ref 10.3–14.5)
SARS-COV-2 RNA SPEC QL NAA+PROBE: SIGNIFICANT CHANGE UP
SODIUM SERPL-SCNC: 130 MMOL/L — LOW (ref 135–145)
WBC # BLD: 5.87 K/UL — SIGNIFICANT CHANGE UP (ref 3.8–10.5)
WBC # FLD AUTO: 5.87 K/UL — SIGNIFICANT CHANGE UP (ref 3.8–10.5)

## 2020-04-13 PROCEDURE — 99233 SBSQ HOSP IP/OBS HIGH 50: CPT | Mod: GC

## 2020-04-13 PROCEDURE — 99223 1ST HOSP IP/OBS HIGH 75: CPT | Mod: CS

## 2020-04-13 RX ORDER — ONDANSETRON 8 MG/1
4 TABLET, FILM COATED ORAL ONCE
Refills: 0 | Status: COMPLETED | OUTPATIENT
Start: 2020-04-13 | End: 2020-04-13

## 2020-04-13 RX ORDER — TIOTROPIUM BROMIDE 18 UG/1
1 CAPSULE ORAL; RESPIRATORY (INHALATION) DAILY
Refills: 0 | Status: DISCONTINUED | OUTPATIENT
Start: 2020-04-13 | End: 2020-04-15

## 2020-04-13 RX ADMIN — LOSARTAN POTASSIUM 50 MILLIGRAM(S): 100 TABLET, FILM COATED ORAL at 05:39

## 2020-04-13 RX ADMIN — Medication 0.25 MILLIGRAM(S): at 05:39

## 2020-04-13 RX ADMIN — PIPERACILLIN AND TAZOBACTAM 200 GRAM(S): 4; .5 INJECTION, POWDER, LYOPHILIZED, FOR SOLUTION INTRAVENOUS at 02:18

## 2020-04-13 RX ADMIN — Medication 100 MILLIGRAM(S): at 00:51

## 2020-04-13 RX ADMIN — Medication 75 MICROGRAM(S): at 05:39

## 2020-04-13 RX ADMIN — Medication 650 MILLIGRAM(S): at 21:58

## 2020-04-13 RX ADMIN — BUDESONIDE AND FORMOTEROL FUMARATE DIHYDRATE 2 PUFF(S): 160; 4.5 AEROSOL RESPIRATORY (INHALATION) at 22:12

## 2020-04-13 RX ADMIN — ATORVASTATIN CALCIUM 40 MILLIGRAM(S): 80 TABLET, FILM COATED ORAL at 21:58

## 2020-04-13 RX ADMIN — APIXABAN 2.5 MILLIGRAM(S): 2.5 TABLET, FILM COATED ORAL at 11:28

## 2020-04-13 RX ADMIN — BUDESONIDE AND FORMOTEROL FUMARATE DIHYDRATE 2 PUFF(S): 160; 4.5 AEROSOL RESPIRATORY (INHALATION) at 11:23

## 2020-04-13 RX ADMIN — MONTELUKAST 10 MILLIGRAM(S): 4 TABLET, CHEWABLE ORAL at 22:02

## 2020-04-13 RX ADMIN — PIPERACILLIN AND TAZOBACTAM 200 GRAM(S): 4; .5 INJECTION, POWDER, LYOPHILIZED, FOR SOLUTION INTRAVENOUS at 11:28

## 2020-04-13 RX ADMIN — ONDANSETRON 4 MILLIGRAM(S): 8 TABLET, FILM COATED ORAL at 23:15

## 2020-04-13 RX ADMIN — MONTELUKAST 10 MILLIGRAM(S): 4 TABLET, CHEWABLE ORAL at 11:28

## 2020-04-13 RX ADMIN — APIXABAN 2.5 MILLIGRAM(S): 2.5 TABLET, FILM COATED ORAL at 21:58

## 2020-04-13 RX ADMIN — AZITHROMYCIN 250 MILLIGRAM(S): 500 TABLET, FILM COATED ORAL at 21:59

## 2020-04-13 RX ADMIN — FAMOTIDINE 20 MILLIGRAM(S): 10 INJECTION INTRAVENOUS at 11:28

## 2020-04-13 RX ADMIN — Medication 400 MILLIGRAM(S): at 21:59

## 2020-04-13 NOTE — DISCHARGE NOTE PROVIDER - NSDCCPCAREPLAN_GEN_ALL_CORE_FT
PRINCIPAL DISCHARGE DIAGNOSIS  Diagnosis: COVID-19 virus detected  Assessment and Plan of Treatment: .You came to the hospital with signs and symptoms concerning for COVID-19. You were swabbed for the infection and it returned positive. You were treated with Azithromycin and Plaquenil while you were in the hospital. The treatment duration is 5 days and if you have not completed 5 days, you will be discharged with enough medication to finish the 5 day course at home. You have been clinically stable and deemed safe for discharge home to continue your treatment course and quarantine.  While you are at home you should stay in your own room whenever possible and wear a mask as much as possible. If you have to be in the same room as someone else, you should both wear a mask. If you share a restroom, you should wipe it down with bleach wipes between each use.  Please continue to practice social distancing and good hand hygiene.  Please follow the remainder of the instructions provided to you at discharge.  You need to quarantine for 14 days after your positive test result, which means you need to quarantine until 4/24.        SECONDARY DISCHARGE DIAGNOSES  Diagnosis: Hypoxia  Assessment and Plan of Treatment: At baseline, your oxygen levels are slightly lower given your COPD history. Your level are back to baseline and you do nor require oxygen at thsi time. Remember to still practice with the incentive spirometer at home and continue you home medications for COPD.

## 2020-04-13 NOTE — PROGRESS NOTE ADULT - PROBLEM SELECTOR PLAN 2
-patient with history of pseudomonas in sputum,  -Procal low will trend daily (0.10)  - urine Cx negative  - as per outpatient pulmonologist given patient has some secretions patient was started on zosyn as bacterial superinfection can't be ruled out

## 2020-04-13 NOTE — CONSULT NOTE ADULT - SUBJECTIVE AND OBJECTIVE BOX
Patient is a 84y old  Female who presents with a chief complaint of SOB fevers (13 Apr 2020 08:01)       HPI:  Patient is an 84 year old female with history of afib on eliquis and digoxin, stroke 1 week ago cerebral and abdoul region, COPD not requiring home oxygen, and pseudomonus in sputum who presents to the emergency room. Home doctor noted patient had temperature of 103 and was 88% on room air. Patient states she does not feel a fever, but does endorse chills. Denies feeling short of breath, and states is not having a COPD exacerbation. Her  is admitted to the hospital because of COVID-19. States having a mechanical fall on her right side a few days prior, but no pain currently. Patient denies diarrhea, nausea, vomiting, or chest pain.   ED course: s/p azithro 500 mg iv and plaquenil 800 mg times 1.   Vitals: Tm of 38.4 C, , /77, RR 97% on 2 L NC, HR to 80-90 after tylenol  CT chest noncontrast: pulmonary emphysema. Evidence of bronchitis and small airway disease. Subpleural opacities in both lower lobes   CT head noncontrast: without acute findings, advanced small vessel ischemic change.  EKG with Qtc 409, atrial fibrillation  lymphopenia noted, Na of 126, and COVID-19 negative times 1 (11 Apr 2020 01:43)      PAST MEDICAL & SURGICAL HISTORY:  Cerebrovascular accident (CVA)  COPD (chronic obstructive pulmonary disease)  Atrial fibrillation  Asthma  Bronchitis  S/P hip replacement, right: 2010      MEDICATIONS  (STANDING):  apixaban 2.5 milliGRAM(s) Oral every 12 hours  atorvastatin 40 milliGRAM(s) Oral at bedtime  azithromycin   Tablet 250 milliGRAM(s) Oral every 24 hours  budesonide 160 MICROgram(s)/formoterol 4.5 MICROgram(s) Inhaler 2 Puff(s) Inhalation two times a day  digoxin     Tablet 0.25 milliGRAM(s) Oral daily  famotidine    Tablet 20 milliGRAM(s) Oral daily  hydroxychloroquine 400 milliGRAM(s) Oral every 24 hours  levothyroxine 75 MICROGram(s) Oral daily  losartan 50 milliGRAM(s) Oral daily  montelukast 10 milliGRAM(s) Oral <User Schedule>  piperacillin/tazobactam IVPB.. 3.375 Gram(s) IV Intermittent every 6 hours    MEDICATIONS  (PRN):  acetaminophen   Tablet .. 650 milliGRAM(s) Oral every 6 hours PRN Temp greater or equal to 38C (100.4F), Mild Pain (1 - 3)  ALBUTerol    90 MICROgram(s) HFA Inhaler 2 Puff(s) Inhalation every 4 hours PRN Shortness of Breath and/or Wheezing  benzonatate 100 milliGRAM(s) Oral three times a day PRN Cough    FAMILY HISTORY:  No pertinent family history in first degree relatives      CBC Full  -  ( 13 Apr 2020 06:00 )  WBC Count : 5.87 K/uL  RBC Count : 3.95 M/uL  Hemoglobin : 12.9 g/dL  Hematocrit : 38.0 %  Platelet Count - Automated : 190 K/uL  Mean Cell Volume : 96.2 fl  Mean Cell Hemoglobin : 32.7 pg  Mean Cell Hemoglobin Concentration : 33.9 gm/dL  Auto Neutrophil # : 3.91 K/uL  Auto Lymphocyte # : 1.07 K/uL  Auto Monocyte # : 0.70 K/uL  Auto Eosinophil # : 0.13 K/uL  Auto Basophil # : 0.01 K/uL  Auto Neutrophil % : 66.6 %  Auto Lymphocyte % : 18.2 %  Auto Monocyte % : 11.9 %  Auto Eosinophil % : 2.2 %  Auto Basophil % : 0.2 %      04-13    130<L>  |  97  |  9   ----------------------------<  88  4.1   |  25  |  0.73    Ca    8.9      13 Apr 2020 06:00  Phos  3.1     04-12  Mg     1.8     04-13    TPro  5.6<L>  /  Alb  3.1<L>  /  TBili  0.5  /  DBili  x   /  AST  26  /  ALT  8<L>  /  AlkPhos  40  04-13            Radiology:    < from: Xray Chest 1 View- PORTABLE-Urgent (04.10.20 @ 18:51) >    EXAM:  XR CHEST PORTABLE URGENT 1V                          PROCEDURE DATE:  04/10/2020          INTERPRETATION:  Chest x-ray    Indication: Shortness of breath and fever    A frontal view of the chest is compared to the prior study dated 9/27/2019. Stable heart size. Questionable patchy opacity in the right upper midlung field versus rib end. Slightly prominent vascular markings in the left lung base probably due to atelectasis as seen on prior study. Cannot exclude trace pleural effusions. Nopneumothorax.      IMPRESSION: Questionable patchy opacity right upper lobe. Probable atelectasis left lung base. Consider CT.          < from: CT Head No Cont (04.10.20 @ 19:32) >    EXAM:  CT BRAIN                          PROCEDURE DATE:  04/10/2020          INTERPRETATION:  Bernabe GRANDA MD, have reviewed the images and the report and agree with the findings.      PRELIMINARY REPORT:    INDICATIONS: Fall on anticoagulation    TECHNIQUE: Serial axial images were obtained from the skull base to the vertex without the use of intravenous contrast. Sagittal and coronal reformats were also reviewed.    COMPARISON EXAMINATION: None.    FINDINGS:    VENTRICLES AND SULCI: Age appropriate parenchymal volume. No hydrocephalus.  INTRA-AXIAL: Streak artifact limits evaluation of inferior cerebellum. Given that limitation, there is no mass effect, acute hemorrhage, or midline shift. There is preservation of gray-white matter differentiation without CT evidence of acute transcortical infarction. [place] and extensive hypodensity within the periventricular and subcortical white matter consistent with small vessel ischemic changes, severe.  EXTRA-AXIAL: No extra-axial fluid collection is present.   VISUALIZED SINUSES: Imaged portions of paranasal sinuses are well aerated.  VISUALIZED MASTOIDS: Well-aerated.  CALVARIUM: No fracture.  MISCELLANEOUS: Native intraocular lenses have been replaced bilaterally.    IMPRESSION:   No acute intracranial hemorrhage or calvarial fracture.  Advanced small vessel ischemic change.        < from: CT Chest No Cont (04.10.20 @ 19:32) >  EXAM:  CT CHEST                          PROCEDURE DATE:  04/10/2020          INTERPRETATION:  CT SCAN OF CHEST    History: Shortness of breath. Abnormal chest x-ray.    Technique: CT scan of chest performed from lung apices through lung bases. Axial, coronal, and sagittal multiplanar reformatted images were produced. Thin section axial images and axial MIPS were also produced. Intravenous contrast material was not administered, as ordered.    Comparison: Chest x-ray performed earlier on the same day. CT of chest 9/30/2019. CT angiogram chest 1/26/2017.    Findings: Normal heart size. Small pericardial effusion. Coronary calcifications. Stable borderline enlarged mediastinal lymph nodes. No hilar or axillary lymphadenopathy. 4.3 cm aneurysmal dilatation of ascending aorta, without significant change. Unchanged metallic density in the left breast, possibly a biopsy clip.    Moderate to severe pulmonary emphysema. Peribronchial and small subpleural dependent consolidations in both lower lobes. 6 mm nodule right upper lobe image 79 series 5 is unchanged since 9/30/2019. 5 mm nodule right upper lobe image 90 series 5 is also unchanged. 4 mm calcified granuloma right upper lobe image 126. 4 mm nodule left lower lobe image 185 series 5, unchanged. 3 mm calcified granuloma left lower lobe, unchanged. Small cluster of linear and nodular branching opacities in the right upper lobe as seen on prior studies, consistent with small airway disease. No pleural effusions.    Stable small hepatic cysts in the upper abdomen.    Degenerative changes of spine.      IMPRESSION    Pulmonary emphysema. Evidence of bronchitis and small airway disease. Subpleural opacities in both lower lobes could be due to atelectasis but cannot exclude small bacterial pneumonia. No typical appearance of COVID-19 infection.    A few subcentimeter pulmonary nodules as above, unchanged since 9/30/2019. Recommend follow-up in 12 months.              Vital Signs Last 24 Hrs  T(C): 36.6 (13 Apr 2020 05:53), Max: 36.9 (12 Apr 2020 20:45)  T(F): 97.9 (13 Apr 2020 05:53), Max: 98.5 (12 Apr 2020 20:45)  HR: 87 (13 Apr 2020 05:53) (87 - 93)  BP: 137/92 (13 Apr 2020 05:53) (121/87 - 137/92)  BP(mean): --  RR: 18 (13 Apr 2020 05:53) (18 - 18)  SpO2: 93% (13 Apr 2020 05:53) (91% - 93%)        REVIEW OF SYSTEMS: per hpi      Physical Exam: on COVID isolation, in accordance with current standards limiting patient contact, please refer to exam performed on 4/13/2020      General: NAD, Laying comfortably in bed  HEENT: NC/AT, anicteric sclera, MMM  Neck: supple  Cardiovascular: +S1/S2, RRR, No murmurs, rubs, gallops  Respiratory: CTA B/L, no W/R/R  Gastrointestinal: soft, NT/ND, +BSx4  Extremities: WWP, no edema, clubbing or cyanosis  Vascular: 2+ radial, DP/PT pulses B/L  Neurological: AAOx3, no focal deficits    PM&R Impression:    1) deconditioned  2) no focal weakness    Plan:    1) Physical therapy focusing on therapeutic exercises, bed mobility/transfer out of bed evaluation, progressive ambulation with assistive devices prn.    2) Anticipated Disposition Plan/Recs:  d/c home with home physical therapy

## 2020-04-13 NOTE — DISCHARGE NOTE PROVIDER - NSDCMRMEDTOKEN_GEN_ALL_CORE_FT
atorvastatin 40 mg oral tablet: 1 tab(s) orally once a day  candesartan 32 mg oral tablet: 1 tab(s) orally once a day  digoxin: 0.25 milligram(s) orally once a day  Eliquis 2.5 mg oral tablet: 1 tab(s) orally 2 times a day  fluticasone-salmeterol 500 mcg-50 mcg inhalation powder: 2 puff(s) inhaled 2 times a day  montelukast 10 mg oral tablet: 1 tab(s) orally 2 times a day  Spiriva: 2.5 microgram(s)2 puffs inhaled 2 times a day  Synthroid 75 mcg (0.075 mg) oral tablet: 1 tab(s) orally once a day  Ventolin HFA 90 mcg/inh inhalation aerosol: 2 puff(s) inhaled every 6 hours

## 2020-04-13 NOTE — PROGRESS NOTE ADULT - PROBLEM SELECTOR PLAN 5
-likely in setting of dehydration  -s/p 500 cc NS in ED   -4/13 Na 130, improved from 126    #Hypothyroidism  -c/w synthroid 75 micrograms per day

## 2020-04-13 NOTE — DISCHARGE NOTE PROVIDER - HOSPITAL COURSE
84 year old female with history of afib on eliquis and digoxin, stroke 1 week ago cerebral and abdoul region, COPD not requiring home oxygen, and pseudomonas in sputum who presents to the emergency room. Home doctor noted patient had temperature of 103 and was 88% on room air. Patient states she does not feel a fever, but does endorse chills. Denies feeling short of breath, and states is not having a COPD exacerbation. Her  is admitted to the hospital because of COVID-19. States having a mechanical fall on her right side a few days prior, but no pain currently.            Problem List/Main Diagnoses          Problem/Plan - 1:    ·  Problem: Pneumonia due to 2019 novel coronavirus.  Plan: -COVID-19 swab times 1 negative, repeat COVID swab Positive 4/10    - on admission lymphopenia present, fevers, and hypoxia on RA    -Started on Azithromycin and Plaquenil     - 92% on 2 L NC, goal spO2 of 90% because of patients history of COPD.          Problem/Plan - 2:    ·  Problem: Gram-negative pneumonia.  Plan: -patient with history of pseudomonas in sputum,     as per outpatient pulmonologist given patient has some secretions patient was started on Zosyn as bacterial superinfection can't be ruled out.         Problem/Plan - 3:    ·  Problem: Pseudomonas aeruginosa colonization.  Plan: as above.          Problem/Plan - 4:    ·  Problem: Chronic bronchitis, unspecified chronic bronchitis type.  Plan: -patient w/ home inhalers not on formulary, patient w/ symbicort BID for now and Proair prn sob or wheezing.          Problem/Plan - 5:    ·  Problem: Hyponatremia.  Plan: -likely in setting of dehydration    -s/p 500 cc NS in ED     - Sodium 131, improved from 126        #Hypothyroidism    -c/w synthroid 75 micrograms per day.          Problem/Plan - 6:    Problem: Atrial fibrillation, unspecified type. Plan: -c/w Eliquis 2.5 mg BID and Digoxin 0.25 mg QD ) am Dig level 1.1 4/11             Problem/Plan - 7:    ·  Problem: Cerebrovascular accident (CVA), unspecified mechanism.  Plan: -per daughter patient with CVA cerebral/abdoul region last week, no residual deficits (was told it was a small stroke)    -c/w Eliquis as above, and Lipitor 40 mg QD.          Problem/Plan - 8:    ·  Problem: Essential hypertension.  Plan: -patient takes Candesartan 31 mg QD at home, states this was cut in half recently    -therapeutic interchange is losartan 50 mg.             Inpatient treatment course:         New medications:         Labs to be followed outpatient:         Exam to be followed outpatient: 84 year old female with history of afib on eliquis and digoxin, stroke 1 week ago cerebral and abdoul region, COPD not requiring home oxygen, and pseudomonas in sputum who presents to the emergency room. Home doctor noted patient had temperature of 103 and was 88% on room air. Patient states she does not feel a fever, but does endorse chills. Denies feeling short of breath, and states is not having a COPD exacerbation. Her  is admitted to the hospital because of COVID-19. States having a mechanical fall on her right side a few days prior, but no pain currently.            Problem List/Main Diagnoses          Problem/Plan - 1:    ·  Problem: Pneumonia due to 2019 novel coronavirus.      Plan: -COVID-19 swab times 1 negative, repeat COVID swab Positive 4/10, prior to discharge, swab was repeated on 4/13/20 - negative    - on admission lymphopenia present, fevers, and hypoxia on RA    -Treated with Azithromycin and Plaquenil     - 92% on 2 L NC, goal spO2 of 90% because of patients history of COPD.          Problem/Plan - 2:    ·  Problem: Gram-negative pneumonia.      Plan: -patient with history of pseudomonas in sputum,     as per outpatient pulmonologist given patient has some secretions, patient was started on Zosyn.         Problem/Plan - 3:    ·  Problem: Pseudomonas aeruginosa colonization.      Plan: as above.          Problem/Plan - 4:    ·  Problem: Chronic bronchitis, unspecified chronic bronchitis type.      Plan: -patient w/ home inhalers not on formulary, patient w/ symbicort BID for now and Proair as needed for sob or wheezing.          Problem/Plan - 5:    ·  Problem: Hyponatremia.      Plan: -likely in setting of dehydration    - received Sodium Chloride 500 cc in ED     - Sodium 131, improved from 126        #Hypothyroidism    -c/w synthroid 75 micrograms per day.          Problem/Plan - 6:    Problem: Atrial fibrillation, unspecified type.     Plan: -c/w Eliquis 2.5 mg BID and Digoxin 0.25 mg QD ) am Dig level 1.1 4/11             Problem/Plan - 7:    ·  Problem: Cerebrovascular accident (CVA), unspecified mechanism.      Plan: -per daughter patient with CVA cerebral/abdoul region last week, no residual deficits (was told it was a small stroke)    -c/w Eliquis as above, and Lipitor 40 mg QD.          Problem/Plan - 8:    ·  Problem: Essential hypertension.      Plan: -patient takes Candesartan 31 mg QD at home, states this was cut in half recently    -therapeutic interchange is losartan 50 mg.             Inpatient treatment course:     84 year old female with history of Afib on Eliquis and Digoxin, stroke 1 week ago cerebral and abdoul region, COPD not requiring home oxygen, and pseudomonas in sputum who presents to the emergency room. Home doctor noted patient had temperature of 103 and was 88% on room air. She was started on Azithromycin, Plaquenil on 4/10/20. Over the hospital course patient improved and didn't require supplemental oxygen on discharge. Patient was also started on Zosyn due to pseudomonas in her sputum as outpatient. Prior to discharge, pulmonary service was consulted regarding discharge antibiotics and after reviewing patient's history and data, they determined that there was no longer need for antibiotics.            New medications:     none        Labs to be followed outpatient:     n/a        Exam to be followed outpatient:     follow up with PCP for regular visit. 84 year old female with history of afib on eliquis and digoxin, stroke 1 week ago cerebral and abdoul region, COPD not requiring home oxygen, and pseudomonas in sputum who presents to the emergency room. Home doctor noted patient had temperature of 103 and was 88% on room air. Patient states she does not feel a fever, but does endorse chills. Denies feeling short of breath, and states is not having a COPD exacerbation. Her  is admitted to the hospital because of COVID-19. States having a mechanical fall on her right side a few days prior, but no pain currently.            Problem List/Main Diagnoses          Problem/Plan - 1:    ·  Problem: Pneumonia due to 2019 novel coronavirus.      Plan: -COVID-19 swab times 1 negative, repeat COVID swab Positive 4/10, prior to discharge, swab was repeated on 4/13/20 - negative    - On admission lymphopenia present, fevers, and hypoxia on RA    -Treated with Azithromycin and Plaquenil 5 days course     - 92% on 2 L NC, goal spO2 of 90% because of patients history of COPD.          Problem/Plan - 2:    ·  Problem: Gram-negative pneumonia.      Plan: -patient with history of pseudomonas in sputum, as per outpatient pulmonologist given patient has some secretions, patient was started on Zosyn (4/10-4/13).         Problem/Plan - 3:    ·  Problem: Pseudomonas aeruginosa colonization.      Plan: as above.          Problem/Plan - 4:    ·  Problem: Chronic bronchitis, unspecified chronic bronchitis type.      Plan: -patient w/ home inhalers not on formulary, patient w/ symbicort BID for now and Proair as needed for sob or wheezing.          Problem/Plan - 5:    ·  Problem: Hyponatremia.      Plan: -likely in setting of dehydration    - received Sodium Chloride 500 cc in ED     - Sodium 131, improved from 126         Problem/Plan - 6:    ·  Problem: Hypothyroidism    -c/w synthroid 75 micrograms per day.          Problem/Plan - 7:    Problem: Atrial fibrillation, unspecified type.     Plan: -c/w Eliquis 2.5 mg BID and Digoxin 0.25 mg QD (Dig level 1.1 on 4/11)         Problem/Plan - 8:    ·  Problem: Cerebrovascular accident (CVA), unspecified mechanism.      Plan: -per daughter patient with CVA cerebral/abdoul region last week, no residual deficits (was told it was a small stroke)    -c/w Eliquis as above, and Lipitor 40 mg QD.          Problem/Plan - 9:    ·  Problem: Essential hypertension.      Plan: -patient takes Candesartan 31 mg QD at home, states this was cut in half recently    -therapeutic interchange is losartan 50 mg.             Inpatient treatment course:     She was started on Azithromycin and Plaquenil on 4/10/20 for 5 days. Over the hospital course patient improved and didn't require supplemental oxygen on discharge. Patient was also started on Zosyn due to pseudomonas in her sputum as outpatient and discontinued on 4/13. Prior to discharge, pulmonary service was consulted regarding discharge antibiotics and after reviewing patient's history and data, they determined that there was no longer need for antibiotics.            New medications:     none        Labs to be followed outpatient:     n/a        Exam to be followed outpatient:     follow up with PCP for regular visit and pulmonary exam.

## 2020-04-13 NOTE — CONSULT NOTE ADULT - SUBJECTIVE AND OBJECTIVE BOX
PULMONARY SERVICE INITIAL CONSULT NOTE  HPI:  84 F with lennox, COPD not requiring home oxygen who presented to ED with  temperature of 103 and was 88% on room air. Patient states she does not feel a fever, but does endorse chills. Denies feeling short of breath, and states is not having a COPD exacerbation. Her  is admitted to the hospital because of COVID-19. States having a mechanical fall on her right side a few days prior, but no pain currently. Patient denies diarrhea, nausea, vomiting, or chest pain.   Since admission she underwent CT chest noncontrast: pulmonary emphysema. Evidence of bronchitis and small airway disease. Subpleural opacities in both lower lobes CT head noncontrast: without acute findings, advanced small vessel ischemic change. unfortunately she has been found to be COVID +.       REVIEW OF SYSTEMS:  12 point ROS done as per HPI.     PAST MEDICAL & SURGICAL HISTORY:  Cerebrovascular accident (CVA)  COPD (chronic obstructive pulmonary disease)  Atrial fibrillation  Asthma  Bronchitis  S/P hip replacement, right: 2010      FAMILY HISTORY:  No pertinent family history in first degree relatives      SOCIAL HISTORY:  Smoking Status: [ ] Current, [x ] Former, [ ] Never  Pack Years:    MEDICATIONS:  Pulmonary:  ALBUTerol    90 MICROgram(s) HFA Inhaler 2 Puff(s) Inhalation every 4 hours PRN  benzonatate 100 milliGRAM(s) Oral three times a day PRN  budesonide 160 MICROgram(s)/formoterol 4.5 MICROgram(s) Inhaler 2 Puff(s) Inhalation two times a day  montelukast 10 milliGRAM(s) Oral <User Schedule>    Antimicrobials:  azithromycin   Tablet 250 milliGRAM(s) Oral every 24 hours  hydroxychloroquine 400 milliGRAM(s) Oral every 24 hours    Anticoagulants:  apixaban 2.5 milliGRAM(s) Oral every 12 hours    Onc:    GI/:  famotidine    Tablet 20 milliGRAM(s) Oral daily    Endocrine:  atorvastatin 40 milliGRAM(s) Oral at bedtime  levothyroxine 75 MICROGram(s) Oral daily    Cardiac:  digoxin     Tablet 0.25 milliGRAM(s) Oral daily  losartan 50 milliGRAM(s) Oral daily    Other Medications:  acetaminophen   Tablet .. 650 milliGRAM(s) Oral every 6 hours PRN      Allergies    Mustard (Anaphylaxis)  No Known Drug Allergies    Intolerances        Vital Signs Last 24 Hrs  T(C): 36.8 (13 Apr 2020 11:38), Max: 36.9 (12 Apr 2020 20:45)  T(F): 98.2 (13 Apr 2020 11:38), Max: 98.5 (12 Apr 2020 20:45)  HR: 92 (13 Apr 2020 11:38) (87 - 93)  BP: 134/75 (13 Apr 2020 11:38) (127/72 - 137/92)  BP(mean): --  RR: 18 (13 Apr 2020 11:38) (18 - 18)  SpO2: 92% (13 Apr 2020 11:38) (92% - 93%)        LABS:      CBC Full  -  ( 13 Apr 2020 06:00 )  WBC Count : 5.87 K/uL  RBC Count : 3.95 M/uL  Hemoglobin : 12.9 g/dL  Hematocrit : 38.0 %  Platelet Count - Automated : 190 K/uL  Mean Cell Volume : 96.2 fl  Mean Cell Hemoglobin : 32.7 pg  Mean Cell Hemoglobin Concentration : 33.9 gm/dL  Auto Neutrophil # : 3.91 K/uL  Auto Lymphocyte # : 1.07 K/uL  Auto Monocyte # : 0.70 K/uL  Auto Eosinophil # : 0.13 K/uL  Auto Basophil # : 0.01 K/uL  Auto Neutrophil % : 66.6 %  Auto Lymphocyte % : 18.2 %  Auto Monocyte % : 11.9 %  Auto Eosinophil % : 2.2 %  Auto Basophil % : 0.2 %    04-13    130<L>  |  97  |  9   ----------------------------<  88  4.1   |  25  |  0.73    Ca    8.9      13 Apr 2020 06:00  Phos  3.1     04-12  Mg     1.8     04-13    TPro  5.6<L>  /  Alb  3.1<L>  /  TBili  0.5  /  DBili  x   /  AST  26  /  ALT  8<L>  /  AlkPhos  40  04-13        RADIOLOGY & ADDITIONAL STUDIES:  CT Chest 04.10.20   Pulmonary emphysema. Evidence of bronchitis and small airway disease. Subpleural opacities in both lower lobes could be due to atelectasis but cannot exclude small bacterial pneumonia. No typical appearance of COVID-19 infection. PULMONARY SERVICE INITIAL CONSULT NOTE  HPI:  84 F with lennox, COPD not requiring home oxygen who presented to ED with  temperature of 103 and was 88% on room air. Patient states she does not feel a fever, but does endorse chills. Denies feeling short of breath, and states is not having a COPD exacerbation. Her  is admitted to the hospital because of COVID-19. States having a mechanical fall on her right side a few days prior, but no pain currently. Patient denies diarrhea, nausea, vomiting, or chest pain.   Since admission she underwent CT chest noncontrast: pulmonary emphysema. Evidence of bronchitis and small airway disease. Subpleural opacities in both lower lobes CT head noncontrast: without acute findings, advanced small vessel ischemic change. unfortunately she has been found to be COVID +.   She has been treated with Zosyn since admission after her Pulmonologist requested to treat her with ABx as she had pseudomonas aeruginosa in her sputum, details of when this culture was obtained is not available.   Pulmonary has been consulted to assess the need for antibiotics.       REVIEW OF SYSTEMS:  12 point ROS done as per HPI.     PAST MEDICAL & SURGICAL HISTORY:  Cerebrovascular accident (CVA)  COPD (chronic obstructive pulmonary disease)  Atrial fibrillation  Asthma  Bronchitis  S/P hip replacement, right: 2010      FAMILY HISTORY:  No pertinent family history in first degree relatives      SOCIAL HISTORY:  Smoking Status: [ ] Current, [x ] Former, [ ] Never  Pack Years:    MEDICATIONS:  Pulmonary:  ALBUTerol    90 MICROgram(s) HFA Inhaler 2 Puff(s) Inhalation every 4 hours PRN  benzonatate 100 milliGRAM(s) Oral three times a day PRN  budesonide 160 MICROgram(s)/formoterol 4.5 MICROgram(s) Inhaler 2 Puff(s) Inhalation two times a day  montelukast 10 milliGRAM(s) Oral <User Schedule>    Antimicrobials:  azithromycin   Tablet 250 milliGRAM(s) Oral every 24 hours  hydroxychloroquine 400 milliGRAM(s) Oral every 24 hours    Anticoagulants:  apixaban 2.5 milliGRAM(s) Oral every 12 hours    Onc:    GI/:  famotidine    Tablet 20 milliGRAM(s) Oral daily    Endocrine:  atorvastatin 40 milliGRAM(s) Oral at bedtime  levothyroxine 75 MICROGram(s) Oral daily    Cardiac:  digoxin     Tablet 0.25 milliGRAM(s) Oral daily  losartan 50 milliGRAM(s) Oral daily    Other Medications:  acetaminophen   Tablet .. 650 milliGRAM(s) Oral every 6 hours PRN      Allergies    Mustard (Anaphylaxis)  No Known Drug Allergies    Intolerances        Vital Signs Last 24 Hrs  T(C): 36.8 (13 Apr 2020 11:38), Max: 36.9 (12 Apr 2020 20:45)  T(F): 98.2 (13 Apr 2020 11:38), Max: 98.5 (12 Apr 2020 20:45)  HR: 92 (13 Apr 2020 11:38) (87 - 93)  BP: 134/75 (13 Apr 2020 11:38) (127/72 - 137/92)  BP(mean): --  RR: 18 (13 Apr 2020 11:38) (18 - 18)  SpO2: 92% (13 Apr 2020 11:38) (92% - 93%)        LABS:      CBC Full  -  ( 13 Apr 2020 06:00 )  WBC Count : 5.87 K/uL  RBC Count : 3.95 M/uL  Hemoglobin : 12.9 g/dL  Hematocrit : 38.0 %  Platelet Count - Automated : 190 K/uL  Mean Cell Volume : 96.2 fl  Mean Cell Hemoglobin : 32.7 pg  Mean Cell Hemoglobin Concentration : 33.9 gm/dL  Auto Neutrophil # : 3.91 K/uL  Auto Lymphocyte # : 1.07 K/uL  Auto Monocyte # : 0.70 K/uL  Auto Eosinophil # : 0.13 K/uL  Auto Basophil # : 0.01 K/uL  Auto Neutrophil % : 66.6 %  Auto Lymphocyte % : 18.2 %  Auto Monocyte % : 11.9 %  Auto Eosinophil % : 2.2 %  Auto Basophil % : 0.2 %    04-13    130<L>  |  97  |  9   ----------------------------<  88  4.1   |  25  |  0.73    Ca    8.9      13 Apr 2020 06:00  Phos  3.1     04-12  Mg     1.8     04-13    TPro  5.6<L>  /  Alb  3.1<L>  /  TBili  0.5  /  DBili  x   /  AST  26  /  ALT  8<L>  /  AlkPhos  40  04-13        RADIOLOGY & ADDITIONAL STUDIES:  CT Chest 04.10.20   Pulmonary emphysema. Evidence of bronchitis and small airway disease. Subpleural opacities in both lower lobes could be due to atelectasis but cannot exclude small bacterial pneumonia. No typical appearance of COVID-19 infection.

## 2020-04-13 NOTE — CONSULT NOTE ADULT - ASSESSMENT
per Internal Medicine    84 year old female with history of Afib on Eliquis and Digoxin, stroke 1 week ago cerebral and abdoul region, COPD not requiring home oxygen, and pseudomonus in sputum who presents to the emergency room with sob and fevers covid r/o. Patient is COVID +.    Problem/Plan - 1:  ·  Problem: Pneumonia due to 2019 novel coronavirus.  Plan: -COVID-19 swab times 1 negative, repeat COVID swab Positive 4/10  - on admission lymphopenia present, fevers, and hypoxia on RA  -c/w azithro and plaquenil for now  -this morning  93% on 2 L NC, goal spO2 of 90% because of patients history of COPD.     Problem/Plan - 2:  ·  Problem: Gram-negative pneumonia.  Plan: -patient with history of pseudomonas in sputum,  -Procal low will trend daily (0.10)  - urine Cx negative  - as per outpatient pulmonologist given patient has some secretions patient was started on zosyn as bacterial superinfection can't be ruled out.     Problem/Plan - 3:  ·  Problem: Pseudomonas aeruginosa colonization.  Plan: as above.     Problem/Plan - 4:  ·  Problem: Chronic bronchitis, unspecified chronic bronchitis type.  Plan: -patient w/ home inhalers not on formulary, patient w/ symbicort BID for now and proair prn sob or wheezing.     Problem/Plan - 5:  ·  Problem: Hyponatremia.  Plan: -likely in setting of dehydration  -s/p 500 cc NS in ED   -4/13 Na 130, improved from 126    #Hypothyroidism  -c/w synthroid 75 micrograms per day.     Problem/Plan - 6:  Problem: Atrial fibrillation, unspecified type. Plan: -c/w Eliquis 2.5 mg BID and Digoxin 0.25 mg QD ) am Dig level 1.1 4/11  c/w current therapy.    Problem/Plan - 7:  ·  Problem: Cerebrovascular accident (CVA), unspecified mechanism.  Plan: -per daughter patient with CVA cerebral/abdoul region last week, no residual deficits (was told it was a small stroke)  -c/w Eliquis as above, and Lipitor 40 mg QD.     Problem/Plan - 8:  ·  Problem: Essential hypertension.  Plan: -patient takes Candesartan 31 mg QD at home, states this was cut in half recently  -therapeutic interchange is losartan 50 mg.     Problem/Plan - 9:  ·  Problem: Nutrition, metabolism, and development symptoms.  Plan: F-s/p 500 cc NS  E-replete K<4 and Mag <2  N-DASH/TLC  eliquis for afib   FULL code.
84 year old female with Afib, COPD and admission with PNA LLL in late 2019 with Pseudomonas presented with sob and fevers, found to have COVID, Pulmonary consulted to assess if she needs antibiotics.       COVID-19:   on Azithro and hydroxychlorquine   on Room air now. CRP improving.   CT chest does not show any patchy GGO's. Other changes are chronic ( emphysema and bronchiectasis), Improved scan compare to 10/2019 when she had LLL PNA.   Hx of P aeroginosa back then , currently non toxic , no WBC elevation, no fever, Procal has been low. - c/w current COVID rx, monitor qtc   - No need for antibiotics therapy.         Patient seen and discussed with Dr Yates.

## 2020-04-13 NOTE — PROGRESS NOTE ADULT - ASSESSMENT
84 year old female with history of Afib on Eliquis and Digoxin, stroke 1 week ago cerebral and abdoul region, COPD not requiring home oxygen, and pseudomonus in sputum who presents to the emergency room with sob and fevers covid r/o. Patient is COVID +.

## 2020-04-13 NOTE — PROGRESS NOTE ADULT - SUBJECTIVE AND OBJECTIVE BOX
OVERNIGHT EVENTS:    SUBJECTIVE / INTERVAL HPI: Patient seen and examined at bedside. Denies f/c, n/v, HA, chest pain, SOB, abdominal pain, diarrhea, constipation, melena, hematochezia, hematuria, dysuria    MEDICATIONS  (STANDING):  apixaban 2.5 milliGRAM(s) Oral every 12 hours  atorvastatin 40 milliGRAM(s) Oral at bedtime  azithromycin   Tablet 250 milliGRAM(s) Oral every 24 hours  budesonide 160 MICROgram(s)/formoterol 4.5 MICROgram(s) Inhaler 2 Puff(s) Inhalation two times a day  digoxin     Tablet 0.25 milliGRAM(s) Oral daily  famotidine    Tablet 20 milliGRAM(s) Oral daily  hydroxychloroquine 400 milliGRAM(s) Oral every 24 hours  levothyroxine 75 MICROGram(s) Oral daily  losartan 50 milliGRAM(s) Oral daily  montelukast 10 milliGRAM(s) Oral <User Schedule>  piperacillin/tazobactam IVPB.. 3.375 Gram(s) IV Intermittent every 6 hours    MEDICATIONS  (PRN):  acetaminophen   Tablet .. 650 milliGRAM(s) Oral every 6 hours PRN Temp greater or equal to 38C (100.4F), Mild Pain (1 - 3)  ALBUTerol    90 MICROgram(s) HFA Inhaler 2 Puff(s) Inhalation every 4 hours PRN Shortness of Breath and/or Wheezing  benzonatate 100 milliGRAM(s) Oral three times a day PRN Cough    Allergies    Mustard (Anaphylaxis)  No Known Drug Allergies    Intolerances        VITAL SIGNS:  Vital Signs Last 24 Hrs  T(C): 36.6 (13 Apr 2020 05:53), Max: 36.9 (12 Apr 2020 20:45)  T(F): 97.9 (13 Apr 2020 05:53), Max: 98.5 (12 Apr 2020 20:45)  HR: 87 (13 Apr 2020 05:53) (87 - 93)  BP: 137/92 (13 Apr 2020 05:53) (121/87 - 137/92)  BP(mean): --  RR: 18 (13 Apr 2020 05:53) (18 - 18)  SpO2: 93% (13 Apr 2020 05:53) (91% - 93%)        PHYSICAL EXAM:  General: NAD, Laying comfortably in bed  HEENT: NC/AT, anicteric sclera, MMM  Neck: supple  Cardiovascular: +S1/S2, RRR, No murmurs, rubs, gallops  Respiratory: CTA B/L, no W/R/R  Gastrointestinal: soft, NT/ND, +BSx4  Extremities: WWP, no edema, clubbing or cyanosis  Vascular: 2+ radial, DP/PT pulses B/L  Neurological: AAOx3, no focal deficits      LABS:                        12.9   5.87  )-----------( 190      ( 13 Apr 2020 06:00 )             38.0     04-13    130<L>  |  97  |  9   ----------------------------<  88  4.1   |  25  |  0.73    Ca    8.9      13 Apr 2020 06:00  Phos  3.1     04-12  Mg     1.8     04-13    TPro  5.6<L>  /  Alb  3.1<L>  /  TBili  0.5  /  DBili  x   /  AST  26  /  ALT  8<L>  /  AlkPhos  40  04-13        CAPILLARY BLOOD GLUCOSE              RADIOLOGY & ADDITIONAL TESTS: Reviewed.

## 2020-04-14 ENCOUNTER — TRANSCRIPTION ENCOUNTER (OUTPATIENT)
Age: 85
End: 2020-04-14

## 2020-04-14 DIAGNOSIS — J18.9 PNEUMONIA, UNSPECIFIED ORGANISM: ICD-10-CM

## 2020-04-14 LAB
ALBUMIN SERPL ELPH-MCNC: 3.1 G/DL — LOW (ref 3.3–5)
ALP SERPL-CCNC: 44 U/L — SIGNIFICANT CHANGE UP (ref 40–120)
ALT FLD-CCNC: 9 U/L — LOW (ref 10–45)
ANION GAP SERPL CALC-SCNC: 9 MMOL/L — SIGNIFICANT CHANGE UP (ref 5–17)
AST SERPL-CCNC: 26 U/L — SIGNIFICANT CHANGE UP (ref 10–40)
BASOPHILS # BLD AUTO: 0.01 K/UL — SIGNIFICANT CHANGE UP (ref 0–0.2)
BASOPHILS NFR BLD AUTO: 0.1 % — SIGNIFICANT CHANGE UP (ref 0–2)
BILIRUB SERPL-MCNC: 0.5 MG/DL — SIGNIFICANT CHANGE UP (ref 0.2–1.2)
BUN SERPL-MCNC: 8 MG/DL — SIGNIFICANT CHANGE UP (ref 7–23)
CALCIUM SERPL-MCNC: 9.2 MG/DL — SIGNIFICANT CHANGE UP (ref 8.4–10.5)
CHLORIDE SERPL-SCNC: 98 MMOL/L — SIGNIFICANT CHANGE UP (ref 96–108)
CO2 SERPL-SCNC: 27 MMOL/L — SIGNIFICANT CHANGE UP (ref 22–31)
CREAT SERPL-MCNC: 0.67 MG/DL — SIGNIFICANT CHANGE UP (ref 0.5–1.3)
CRP SERPL-MCNC: 7.61 MG/DL — HIGH (ref 0–0.4)
CULTURE RESULTS: NO GROWTH — SIGNIFICANT CHANGE UP
CULTURE RESULTS: NO GROWTH — SIGNIFICANT CHANGE UP
EOSINOPHIL # BLD AUTO: 0.05 K/UL — SIGNIFICANT CHANGE UP (ref 0–0.5)
EOSINOPHIL NFR BLD AUTO: 0.6 % — SIGNIFICANT CHANGE UP (ref 0–6)
FERRITIN SERPL-MCNC: 275 NG/ML — HIGH (ref 15–150)
GLUCOSE SERPL-MCNC: 89 MG/DL — SIGNIFICANT CHANGE UP (ref 70–99)
HCT VFR BLD CALC: 39.1 % — SIGNIFICANT CHANGE UP (ref 34.5–45)
HGB BLD-MCNC: 13.2 G/DL — SIGNIFICANT CHANGE UP (ref 11.5–15.5)
IMM GRANULOCYTES NFR BLD AUTO: 1 % — SIGNIFICANT CHANGE UP (ref 0–1.5)
LYMPHOCYTES # BLD AUTO: 0.91 K/UL — LOW (ref 1–3.3)
LYMPHOCYTES # BLD AUTO: 11.1 % — LOW (ref 13–44)
MAGNESIUM SERPL-MCNC: 1.8 MG/DL — SIGNIFICANT CHANGE UP (ref 1.6–2.6)
MCHC RBC-ENTMCNC: 32.4 PG — SIGNIFICANT CHANGE UP (ref 27–34)
MCHC RBC-ENTMCNC: 33.8 GM/DL — SIGNIFICANT CHANGE UP (ref 32–36)
MCV RBC AUTO: 95.8 FL — SIGNIFICANT CHANGE UP (ref 80–100)
MONOCYTES # BLD AUTO: 1 K/UL — HIGH (ref 0–0.9)
MONOCYTES NFR BLD AUTO: 12.2 % — SIGNIFICANT CHANGE UP (ref 2–14)
NEUTROPHILS # BLD AUTO: 6.13 K/UL — SIGNIFICANT CHANGE UP (ref 1.8–7.4)
NEUTROPHILS NFR BLD AUTO: 75 % — SIGNIFICANT CHANGE UP (ref 43–77)
NRBC # BLD: 0 /100 WBCS — SIGNIFICANT CHANGE UP (ref 0–0)
PLATELET # BLD AUTO: 217 K/UL — SIGNIFICANT CHANGE UP (ref 150–400)
POTASSIUM SERPL-MCNC: 4.4 MMOL/L — SIGNIFICANT CHANGE UP (ref 3.5–5.3)
POTASSIUM SERPL-SCNC: 4.4 MMOL/L — SIGNIFICANT CHANGE UP (ref 3.5–5.3)
PROT SERPL-MCNC: 5.9 G/DL — LOW (ref 6–8.3)
RBC # BLD: 4.08 M/UL — SIGNIFICANT CHANGE UP (ref 3.8–5.2)
RBC # FLD: 13.6 % — SIGNIFICANT CHANGE UP (ref 10.3–14.5)
SODIUM SERPL-SCNC: 134 MMOL/L — LOW (ref 135–145)
SPECIMEN SOURCE: SIGNIFICANT CHANGE UP
SPECIMEN SOURCE: SIGNIFICANT CHANGE UP
WBC # BLD: 8.18 K/UL — SIGNIFICANT CHANGE UP (ref 3.8–10.5)
WBC # FLD AUTO: 8.18 K/UL — SIGNIFICANT CHANGE UP (ref 3.8–10.5)

## 2020-04-14 PROCEDURE — 99232 SBSQ HOSP IP/OBS MODERATE 35: CPT | Mod: GC

## 2020-04-14 RX ORDER — CALCIUM CARBONATE 500(1250)
1 TABLET ORAL
Refills: 0 | Status: DISCONTINUED | OUTPATIENT
Start: 2020-04-14 | End: 2020-04-15

## 2020-04-14 RX ADMIN — MONTELUKAST 10 MILLIGRAM(S): 4 TABLET, CHEWABLE ORAL at 22:47

## 2020-04-14 RX ADMIN — FAMOTIDINE 20 MILLIGRAM(S): 10 INJECTION INTRAVENOUS at 11:21

## 2020-04-14 RX ADMIN — Medication 400 MILLIGRAM(S): at 22:48

## 2020-04-14 RX ADMIN — BUDESONIDE AND FORMOTEROL FUMARATE DIHYDRATE 2 PUFF(S): 160; 4.5 AEROSOL RESPIRATORY (INHALATION) at 22:49

## 2020-04-14 RX ADMIN — LOSARTAN POTASSIUM 50 MILLIGRAM(S): 100 TABLET, FILM COATED ORAL at 06:43

## 2020-04-14 RX ADMIN — Medication 1 TABLET(S): at 11:39

## 2020-04-14 RX ADMIN — AZITHROMYCIN 250 MILLIGRAM(S): 500 TABLET, FILM COATED ORAL at 22:48

## 2020-04-14 RX ADMIN — Medication 650 MILLIGRAM(S): at 22:48

## 2020-04-14 RX ADMIN — MONTELUKAST 10 MILLIGRAM(S): 4 TABLET, CHEWABLE ORAL at 09:26

## 2020-04-14 RX ADMIN — APIXABAN 2.5 MILLIGRAM(S): 2.5 TABLET, FILM COATED ORAL at 10:12

## 2020-04-14 RX ADMIN — BUDESONIDE AND FORMOTEROL FUMARATE DIHYDRATE 2 PUFF(S): 160; 4.5 AEROSOL RESPIRATORY (INHALATION) at 09:25

## 2020-04-14 RX ADMIN — TIOTROPIUM BROMIDE 1 CAPSULE(S): 18 CAPSULE ORAL; RESPIRATORY (INHALATION) at 11:21

## 2020-04-14 RX ADMIN — Medication 1 TABLET(S): at 15:38

## 2020-04-14 RX ADMIN — Medication 650 MILLIGRAM(S): at 06:59

## 2020-04-14 RX ADMIN — Medication 0.25 MILLIGRAM(S): at 06:43

## 2020-04-14 RX ADMIN — Medication 75 MICROGRAM(S): at 06:43

## 2020-04-14 RX ADMIN — ATORVASTATIN CALCIUM 40 MILLIGRAM(S): 80 TABLET, FILM COATED ORAL at 22:48

## 2020-04-14 RX ADMIN — APIXABAN 2.5 MILLIGRAM(S): 2.5 TABLET, FILM COATED ORAL at 22:48

## 2020-04-14 RX ADMIN — Medication 650 MILLIGRAM(S): at 15:38

## 2020-04-14 NOTE — PROGRESS NOTE ADULT - PROBLEM SELECTOR PLAN 2
-patient with history of pseudomonas in sputum,  -treated w/Zosyn originally, but was stopped yesterday, no need as per pulmonary.

## 2020-04-14 NOTE — PROGRESS NOTE ADULT - PROBLEM SELECTOR PLAN 4
-patient w/ home inhalers c/w Spiriva, patient w/ symbicort BID for now and proair prn sob or wheezing.

## 2020-04-14 NOTE — PROGRESS NOTE ADULT - ATTENDING COMMENTS
Patient was seen and examined with the resident team today.  I agree with the above assessment and plan with the following exceptions/additions:     Briefly, this is an 85yo woman with a PMH of Afib (on Eliquis and digoxin), recent CVA (1 wk PTA) and COPD (not on home O2) who p/w fever and hypoxia, found to have sepsis 2/2 COVID-19.  Given remote history of Pseudomonas in her sputum, also treated for presumed gram-negative pneumonia.  Now on room air.  Repeat COVID-19 PCR on 4/13 negative; however, did have a temp of 100.4 last night and still w/some diarrhea.  Unclear if diarrhea infectious v antibiotic-induced.     -- asked patient to log number of BMs as she reports improvement   -- trend fever curve   -- SpO2 goal >88%  -- c/w Azithromycin and Plaquenil  -- s/p Zosyn    -- c/w Eliquis and digoxin  -- c/w Losartan   -- c/w Levothyroxine   -- c/w COPD meds  -- DVT PPx - on Eliquis   -- Dispo - home (likely Wednesday)    Kristin Zelaya  [phone]
Patient was seen and examined with the resident team today.  I agree with the above assessment and plan with the following exceptions/additions:     Briefly, this is an 83yo woman with a PMH of Afib (on Eliquis and digoxin), recent CVA (1 wk PTA) and COPD (not on home O2) who p/w fever and hypoxia, found to have sepsis 2/2 COVID-19.  Given remote history of Pseudomonas in her sputum, also treated for presumed gram-negative pneumonia.  Now on room air.     -- c/w Azithromycin and Plaquenil  -- c/w Zosyn but can stop upon discharge   -- c/w Eliquis and digoxin  -- c/w Losartan   -- c/w Levothyroxine   -- c/w COPD meds  -- DVT PPx - on Eliquis   -- Dispo - home (likely Tuesday)    Kristin Zelaya  [phone]

## 2020-04-14 NOTE — DISCHARGE NOTE NURSING/CASE MANAGEMENT/SOCIAL WORK - NSDCPEPTSTRK_GEN_ALL_CORE
Call 911 for stroke/Need for follow up after discharge/Stroke warning signs and symptoms/Signs and symptoms of stroke/Stroke education booklet/Prescribed medications/Risk factors for stroke/Stroke support groups for patients, families, and friends

## 2020-04-14 NOTE — PROGRESS NOTE ADULT - SUBJECTIVE AND OBJECTIVE BOX
OVERNIGHT EVENTS: none    SUBJECTIVE / INTERVAL HPI: Patient seen and examined at bedside. Denies fever, chills, SOB,  MEDICATIONS  (STANDING):  apixaban 2.5 milliGRAM(s) Oral every 12 hours  atorvastatin 40 milliGRAM(s) Oral at bedtime  azithromycin   Tablet 250 milliGRAM(s) Oral every 24 hours  budesonide 160 MICROgram(s)/formoterol 4.5 MICROgram(s) Inhaler 2 Puff(s) Inhalation two times a day  digoxin     Tablet 0.25 milliGRAM(s) Oral daily  famotidine    Tablet 20 milliGRAM(s) Oral daily  hydroxychloroquine 400 milliGRAM(s) Oral every 24 hours  levothyroxine 75 MICROGram(s) Oral daily  losartan 50 milliGRAM(s) Oral daily  montelukast 10 milliGRAM(s) Oral <User Schedule>  tiotropium 18 MICROgram(s) Capsule 1 Capsule(s) Inhalation daily    MEDICATIONS  (PRN):  acetaminophen   Tablet .. 650 milliGRAM(s) Oral every 6 hours PRN Temp greater or equal to 38C (100.4F), Mild Pain (1 - 3)  ALBUTerol    90 MICROgram(s) HFA Inhaler 2 Puff(s) Inhalation every 4 hours PRN Shortness of Breath and/or Wheezing  benzonatate 100 milliGRAM(s) Oral three times a day PRN Cough  calcium carbonate    500 mG (Tums) Chewable 1 Tablet(s) Chew two times a day PRN Indigestion    Allergies    Mustard (Anaphylaxis)  No Known Drug Allergies    Intolerances        VITAL SIGNS:  Vital Signs Last 24 Hrs  T(C): 36.9 (14 Apr 2020 09:35), Max: 38 (13 Apr 2020 22:00)  T(F): 98.5 (14 Apr 2020 09:35), Max: 100.4 (13 Apr 2020 22:00)  HR: 94 (14 Apr 2020 09:35) (88 - 123)  BP: 117/73 (14 Apr 2020 09:35) (117/73 - 158/82)  BP(mean): --  RR: 20 (14 Apr 2020 09:36) (18 - 20)  SpO2: 92% (14 Apr 2020 09:36) (88% - 96%)        PHYSICAL EXAM:  General: NAD, Laying comfortably in bed  HEENT: NC/AT, anicteric sclera, MMM  Neck: supple  Cardiovascular: +S1/S2, RRR, No murmurs, rubs, gallops  Respiratory: CTA B/L, no W/R/R  Gastrointestinal: soft, NT/ND, +BSx4  Extremities: WWP, no edema, clubbing or cyanosis  Vascular: 2+ radial, DP/PT pulses B/L  Neurological: AAOx3, no focal deficits      LABS:                        13.2   8.18  )-----------( 217      ( 14 Apr 2020 06:34 )             39.1     04-14    134<L>  |  98  |  8   ----------------------------<  89  4.4   |  27  |  0.67    Ca    9.2      14 Apr 2020 06:34  Mg     1.8     04-14    TPro  5.9<L>  /  Alb  3.1<L>  /  TBili  0.5  /  DBili  x   /  AST  26  /  ALT  9<L>  /  AlkPhos  44  04-14        CAPILLARY BLOOD GLUCOSE              RADIOLOGY & ADDITIONAL TESTS: Reviewed. OVERNIGHT EVENTS: nausea ( Zofran given x 1)    SUBJECTIVE / INTERVAL HPI: Patient seen and examined at bedside. Denies fever, chills, SOB,  MEDICATIONS  (STANDING):  apixaban 2.5 milliGRAM(s) Oral every 12 hours  atorvastatin 40 milliGRAM(s) Oral at bedtime  azithromycin   Tablet 250 milliGRAM(s) Oral every 24 hours  budesonide 160 MICROgram(s)/formoterol 4.5 MICROgram(s) Inhaler 2 Puff(s) Inhalation two times a day  digoxin     Tablet 0.25 milliGRAM(s) Oral daily  famotidine    Tablet 20 milliGRAM(s) Oral daily  hydroxychloroquine 400 milliGRAM(s) Oral every 24 hours  levothyroxine 75 MICROGram(s) Oral daily  losartan 50 milliGRAM(s) Oral daily  montelukast 10 milliGRAM(s) Oral <User Schedule>  tiotropium 18 MICROgram(s) Capsule 1 Capsule(s) Inhalation daily    MEDICATIONS  (PRN):  acetaminophen   Tablet .. 650 milliGRAM(s) Oral every 6 hours PRN Temp greater or equal to 38C (100.4F), Mild Pain (1 - 3)  ALBUTerol    90 MICROgram(s) HFA Inhaler 2 Puff(s) Inhalation every 4 hours PRN Shortness of Breath and/or Wheezing  benzonatate 100 milliGRAM(s) Oral three times a day PRN Cough  calcium carbonate    500 mG (Tums) Chewable 1 Tablet(s) Chew two times a day PRN Indigestion    Allergies    Mustard (Anaphylaxis)  No Known Drug Allergies    Intolerances        VITAL SIGNS:  Vital Signs Last 24 Hrs  T(C): 36.9 (14 Apr 2020 09:35), Max: 38 (13 Apr 2020 22:00)  T(F): 98.5 (14 Apr 2020 09:35), Max: 100.4 (13 Apr 2020 22:00)  HR: 94 (14 Apr 2020 09:35) (88 - 123)  BP: 117/73 (14 Apr 2020 09:35) (117/73 - 158/82)  BP(mean): --  RR: 20 (14 Apr 2020 09:36) (18 - 20)  SpO2: 92% (14 Apr 2020 09:36) (88% - 96%)        PHYSICAL EXAM:  General: NAD, Laying comfortably in bed  HEENT: NC/AT, anicteric sclera, MMM  Neck: supple  Cardiovascular: +S1/S2, RRR, No murmurs, rubs, gallops  Respiratory: CTA B/L, no W/R/R  Gastrointestinal: soft, NT/ND, +BSx4  Extremities: WWP, no edema, clubbing or cyanosis  Vascular: 2+ radial, DP/PT pulses B/L  Neurological: AAOx3, no focal deficits      LABS:                        13.2   8.18  )-----------( 217      ( 14 Apr 2020 06:34 )             39.1     04-14    134<L>  |  98  |  8   ----------------------------<  89  4.4   |  27  |  0.67    Ca    9.2      14 Apr 2020 06:34  Mg     1.8     04-14    TPro  5.9<L>  /  Alb  3.1<L>  /  TBili  0.5  /  DBili  x   /  AST  26  /  ALT  9<L>  /  AlkPhos  44  04-14        CAPILLARY BLOOD GLUCOSE              RADIOLOGY & ADDITIONAL TESTS: Reviewed.

## 2020-04-14 NOTE — PROGRESS NOTE ADULT - PROBLEM SELECTOR PLAN 1
-COVID-19 swab times 1 negative, repeat COVID swab Positive 4/10  - on admission lymphopenia present, fevers, and hypoxia on RA  -c/w azithro and plaquenil for now  -this morning  92% on 2 L NC, goal spO2 of 90% because of patients history of COPD.
-COVID-19 swab times 1 negative, repeat COVID swab Positive 4/10  - on admission lymphopenia present, fevers, and hypoxia on RA  -c/w azithro and plaquenil for now  -this morning  93% on 2 L NC, goal spO2 of 90% because of patients history of COPD.
-COVID-19 swab times 1 negative, repeat COVID swab Positive 4/10  - on admission lymphopenia present, fevers, and hypoxia on RA  -c/w azithro and plaquenil for now  -this morning improved to 91% RA, goal spO2 of 90% because of patients history of COPD.
-COVID-19 swab times 1 negative, repeat COVID swab Positive 4/10  - on admission lymphopenia present, fevers, and hypoxia on RA  - will complete azithro and plaquenil today  - this morning  93% on RA, goal spO2 of 90% because of patients history of COPD.  - pt is stable to be d/c home. Most likely tomorrow.

## 2020-04-14 NOTE — PROGRESS NOTE ADULT - PROBLEM SELECTOR PLAN 5
-likely in setting of dehydration  -s/p 500 cc NS in ED   -4/13 Na 134, improved from 126    #Hypothyroidism  -c/w synthroid 75 micrograms per day

## 2020-04-14 NOTE — DISCHARGE NOTE NURSING/CASE MANAGEMENT/SOCIAL WORK - PATIENT PORTAL LINK FT
You can access the FollowMyHealth Patient Portal offered by [facility] by registering at the following website: [email or web address]. By joining [employer]’s FollowMyHealth portal, you will also be able to view your health information using other applications (apps) compatible with our system.

## 2020-04-14 NOTE — PROGRESS NOTE ADULT - PROBLEM SELECTOR PLAN 9
F-s/p 500 cc NS  E-replete K<4 and Mag <2  N-DASH/TLC  PT eval appreciated: recommended home PT 2-3 times/week  eliquis for afib   FULL code
F-s/p 500 cc NS  E-replete K<4 and Mag <2  N-DASH/TLC  eliquis for afib   FULL code
F-s/p 500 cc NS  E-replete K<4 and Mag <2  N-DASH/TLC  PT eval appreciated: recommended home PT 2-3 times/week  eliquis for afib   FULL code
F-s/p 500 cc NS  E-replete K<4 and Mag <2  N-DASH/TLC  eliquis for afib   FULL code

## 2020-04-15 VITALS
SYSTOLIC BLOOD PRESSURE: 121 MMHG | RESPIRATION RATE: 20 BRPM | OXYGEN SATURATION: 89 % | TEMPERATURE: 98 F | HEART RATE: 109 BPM | DIASTOLIC BLOOD PRESSURE: 78 MMHG

## 2020-04-15 LAB
ALBUMIN SERPL ELPH-MCNC: 2.9 G/DL — LOW (ref 3.3–5)
ALP SERPL-CCNC: 41 U/L — SIGNIFICANT CHANGE UP (ref 40–120)
ALT FLD-CCNC: 8 U/L — LOW (ref 10–45)
ANION GAP SERPL CALC-SCNC: 9 MMOL/L — SIGNIFICANT CHANGE UP (ref 5–17)
AST SERPL-CCNC: 24 U/L — SIGNIFICANT CHANGE UP (ref 10–40)
BASOPHILS # BLD AUTO: 0.01 K/UL — SIGNIFICANT CHANGE UP (ref 0–0.2)
BASOPHILS NFR BLD AUTO: 0.1 % — SIGNIFICANT CHANGE UP (ref 0–2)
BILIRUB SERPL-MCNC: 0.4 MG/DL — SIGNIFICANT CHANGE UP (ref 0.2–1.2)
BUN SERPL-MCNC: 9 MG/DL — SIGNIFICANT CHANGE UP (ref 7–23)
CALCIUM SERPL-MCNC: 8.8 MG/DL — SIGNIFICANT CHANGE UP (ref 8.4–10.5)
CHLORIDE SERPL-SCNC: 97 MMOL/L — SIGNIFICANT CHANGE UP (ref 96–108)
CO2 SERPL-SCNC: 25 MMOL/L — SIGNIFICANT CHANGE UP (ref 22–31)
CREAT SERPL-MCNC: 0.67 MG/DL — SIGNIFICANT CHANGE UP (ref 0.5–1.3)
CRP SERPL-MCNC: 9.46 MG/DL — HIGH (ref 0–0.4)
EOSINOPHIL # BLD AUTO: 0.07 K/UL — SIGNIFICANT CHANGE UP (ref 0–0.5)
EOSINOPHIL NFR BLD AUTO: 0.7 % — SIGNIFICANT CHANGE UP (ref 0–6)
FERRITIN SERPL-MCNC: 280 NG/ML — HIGH (ref 15–150)
GLUCOSE SERPL-MCNC: 93 MG/DL — SIGNIFICANT CHANGE UP (ref 70–99)
HCT VFR BLD CALC: 37.5 % — SIGNIFICANT CHANGE UP (ref 34.5–45)
HGB BLD-MCNC: 12.6 G/DL — SIGNIFICANT CHANGE UP (ref 11.5–15.5)
IMM GRANULOCYTES NFR BLD AUTO: 1 % — SIGNIFICANT CHANGE UP (ref 0–1.5)
LYMPHOCYTES # BLD AUTO: 0.72 K/UL — LOW (ref 1–3.3)
LYMPHOCYTES # BLD AUTO: 7.5 % — LOW (ref 13–44)
MAGNESIUM SERPL-MCNC: 1.8 MG/DL — SIGNIFICANT CHANGE UP (ref 1.6–2.6)
MCHC RBC-ENTMCNC: 32.5 PG — SIGNIFICANT CHANGE UP (ref 27–34)
MCHC RBC-ENTMCNC: 33.6 GM/DL — SIGNIFICANT CHANGE UP (ref 32–36)
MCV RBC AUTO: 96.6 FL — SIGNIFICANT CHANGE UP (ref 80–100)
MONOCYTES # BLD AUTO: 1.02 K/UL — HIGH (ref 0–0.9)
MONOCYTES NFR BLD AUTO: 10.6 % — SIGNIFICANT CHANGE UP (ref 2–14)
NEUTROPHILS # BLD AUTO: 7.72 K/UL — HIGH (ref 1.8–7.4)
NEUTROPHILS NFR BLD AUTO: 80.1 % — HIGH (ref 43–77)
NRBC # BLD: 0 /100 WBCS — SIGNIFICANT CHANGE UP (ref 0–0)
PLATELET # BLD AUTO: 227 K/UL — SIGNIFICANT CHANGE UP (ref 150–400)
POTASSIUM SERPL-MCNC: 4.4 MMOL/L — SIGNIFICANT CHANGE UP (ref 3.5–5.3)
POTASSIUM SERPL-SCNC: 4.4 MMOL/L — SIGNIFICANT CHANGE UP (ref 3.5–5.3)
PROT SERPL-MCNC: 5.6 G/DL — LOW (ref 6–8.3)
RBC # BLD: 3.88 M/UL — SIGNIFICANT CHANGE UP (ref 3.8–5.2)
RBC # FLD: 13.7 % — SIGNIFICANT CHANGE UP (ref 10.3–14.5)
SODIUM SERPL-SCNC: 131 MMOL/L — LOW (ref 135–145)
WBC # BLD: 9.64 K/UL — SIGNIFICANT CHANGE UP (ref 3.8–10.5)
WBC # FLD AUTO: 9.64 K/UL — SIGNIFICANT CHANGE UP (ref 3.8–10.5)

## 2020-04-15 PROCEDURE — 99239 HOSP IP/OBS DSCHRG MGMT >30: CPT

## 2020-04-15 RX ORDER — LOPERAMIDE HCL 2 MG
2 TABLET ORAL ONCE
Refills: 0 | Status: COMPLETED | OUTPATIENT
Start: 2020-04-15 | End: 2020-04-15

## 2020-04-15 RX ADMIN — APIXABAN 2.5 MILLIGRAM(S): 2.5 TABLET, FILM COATED ORAL at 10:41

## 2020-04-15 RX ADMIN — Medication 0.25 MILLIGRAM(S): at 06:13

## 2020-04-15 RX ADMIN — Medication 75 MICROGRAM(S): at 06:13

## 2020-04-15 RX ADMIN — FAMOTIDINE 20 MILLIGRAM(S): 10 INJECTION INTRAVENOUS at 10:41

## 2020-04-15 RX ADMIN — Medication 1 TABLET(S): at 06:13

## 2020-04-15 RX ADMIN — MONTELUKAST 10 MILLIGRAM(S): 4 TABLET, CHEWABLE ORAL at 10:41

## 2020-04-15 RX ADMIN — Medication 650 MILLIGRAM(S): at 06:12

## 2020-04-15 RX ADMIN — LOSARTAN POTASSIUM 50 MILLIGRAM(S): 100 TABLET, FILM COATED ORAL at 06:13

## 2020-04-15 RX ADMIN — Medication 2 MILLIGRAM(S): at 13:00

## 2020-04-15 NOTE — PROGRESS NOTE ADULT - ASSESSMENT
per Internal medicine    84 year old female with history of Afib on Eliquis and Digoxin, stroke 1 week ago cerebral and abdoul region, COPD not requiring home oxygen, and pseudomonus in sputum who presents to the emergency room with sob and fevers covid r/o. Patient is COVID +.    Problem/Plan - 1:  ·  Problem: Pneumonia due to 2019 novel coronavirus.  Plan: -COVID-19 swab times 1 negative, repeat COVID swab Positive 4/10  - on admission lymphopenia present, fevers, and hypoxia on RA  - will complete azithro and plaquenil today  - this morning  93% on RA, goal spO2 of 90% because of patients history of COPD.  - pt is stable to be d/c home. Most likely tomorrow.     Problem/Plan - 2:  ·  Problem: Gram-negative pneumonia.  Plan: -patient with history of pseudomonas in sputum,  -treated w/Zosyn originally, but was stopped yesterday, no need as per pulmonary.     Problem/Plan - 3:  ·  Problem: Pseudomonas aeruginosa colonization.  Plan: as above.     Problem/Plan - 4:  ·  Problem: Chronic bronchitis, unspecified chronic bronchitis type.  Plan: -patient w/ home inhalers c/w Spiriva, patient w/ symbicort BID for now and proair prn sob or wheezing.     Problem/Plan - 5:  ·  Problem: Hyponatremia.  Plan: -likely in setting of dehydration  -s/p 500 cc NS in ED   -4/13 Na 134, improved from 126    #Hypothyroidism  -c/w synthroid 75 micrograms per day.     Problem/Plan - 6:  Problem: Atrial fibrillation, unspecified type. Plan: -c/w Eliquis 2.5 mg BID and Digoxin 0.25 mg QD ) am Dig level 1.1 4/11  c/w current therapy.    Problem/Plan - 7:  ·  Problem: Cerebrovascular accident (CVA), unspecified mechanism.  Plan: -per daughter patient with CVA cerebral/abdoul region last week, no residual deficits (was told it was a small stroke)  -c/w Eliquis as above, and Lipitor 40 mg QD.     Problem/Plan - 8:  ·  Problem: Essential hypertension.  Plan: -patient takes Candesartan 31 mg QD at home, states this was cut in half recently  -therapeutic interchange is losartan 50 mg.     Problem/Plan - 9:  ·  Problem: Nutrition, metabolism, and development symptoms.  Plan: F-s/p 500 cc NS  E-replete K<4 and Mag <2  N-DASH/TLC  PT eval appreciated: recommended home PT 2-3 times/week  eliquis for afib   FULL code.

## 2020-04-15 NOTE — PROGRESS NOTE ADULT - SUBJECTIVE AND OBJECTIVE BOX
Physical Medicine and Rehabilitation Progress Note:    Patient is a 84y old  Female who presents with a chief complaint of SOB fevers (14 Apr 2020 12:28)      HPI:  Patient is an 84 year old female with history of afib on eliquis and digoxin, stroke 1 week ago cerebral and abdoul region, COPD not requiring home oxygen, and pseudomonus in sputum who presents to the emergency room. Home doctor noted patient had temperature of 103 and was 88% on room air. Patient states she does not feel a fever, but does endorse chills. Denies feeling short of breath, and states is not having a COPD exacerbation. Her  is admitted to the hospital because of COVID-19. States having a mechanical fall on her right side a few days prior, but no pain currently. Patient denies diarrhea, nausea, vomiting, or chest pain.   ED course: s/p azithro 500 mg iv and plaquenil 800 mg times 1.   Vitals: Tm of 38.4 C, , /77, RR 97% on 2 L NC, HR to 80-90 after tylenol  CT chest noncontrast: pulmonary emphysema. Evidence of bronchitis and small airway disease. Subpleural opacities in both lower lobes   CT head noncontrast: without acute findings, advanced small vessel ischemic change.  EKG with Qtc 409, atrial fibrillation  lymphopenia noted, Na of 126, and COVID-19 negative times 1 (11 Apr 2020 01:43)                            12.6   9.64  )-----------( 227      ( 15 Apr 2020 07:28 )             37.5       04-15    131<L>  |  97  |  9   ----------------------------<  93  4.4   |  25  |  0.67    Ca    8.8      15 Apr 2020 07:28  Mg     1.8     04-15    TPro  5.6<L>  /  Alb  2.9<L>  /  TBili  0.4  /  DBili  x   /  AST  24  /  ALT  8<L>  /  AlkPhos  41  04-15    Vital Signs Last 24 Hrs  T(C): 36.8 (15 Apr 2020 07:32), Max: 37.3 (15 Apr 2020 05:35)  T(F): 98.2 (15 Apr 2020 07:32), Max: 99.1 (15 Apr 2020 05:35)  HR: 109 (15 Apr 2020 07:32) (93 - 112)  BP: 121/78 (15 Apr 2020 07:32) (118/73 - 133/88)  BP(mean): --  RR: 20 (15 Apr 2020 07:32) (20 - 20)  SpO2: 89% (15 Apr 2020 07:32) (89% - 93%)    MEDICATIONS  (STANDING):  apixaban 2.5 milliGRAM(s) Oral every 12 hours  atorvastatin 40 milliGRAM(s) Oral at bedtime  budesonide 160 MICROgram(s)/formoterol 4.5 MICROgram(s) Inhaler 2 Puff(s) Inhalation two times a day  digoxin     Tablet 0.25 milliGRAM(s) Oral daily  famotidine    Tablet 20 milliGRAM(s) Oral daily  levothyroxine 75 MICROGram(s) Oral daily  losartan 50 milliGRAM(s) Oral daily  montelukast 10 milliGRAM(s) Oral <User Schedule>  tiotropium 18 MICROgram(s) Capsule 1 Capsule(s) Inhalation daily    MEDICATIONS  (PRN):  acetaminophen   Tablet .. 650 milliGRAM(s) Oral every 6 hours PRN Temp greater or equal to 38C (100.4F), Mild Pain (1 - 3)  ALBUTerol    90 MICROgram(s) HFA Inhaler 2 Puff(s) Inhalation every 4 hours PRN Shortness of Breath and/or Wheezing  benzonatate 100 milliGRAM(s) Oral three times a day PRN Cough  calcium carbonate    500 mG (Tums) Chewable 1 Tablet(s) Chew two times a day PRN Indigestion    Currently Undergoing Physical Therapy at bedside.    Functional Status Assessment: per PT/OT note 4/14/2020      Therapeutic Interventions      Bed Mobility  Bed Mobility Training Scooting: independent  Bed Mobility Training Sit-to-Supine: independent  Bed Mobility Training Supine-to-Sit: independent    Sit-Stand Transfer Training  Transfer Training Sit-to-Stand Transfer: independent;  straight cane  Transfer Training Stand-to-Sit Transfer: independent;  straight cane    Gait Training  Gait Training Symptoms Noted During/After Treatment: fatigue  Gait Training: supervsion;  supervision;  verbal cues;  weight-bearing as tolerated   straight cane;  60 feet  Gait Analysis: 3-point gait   decreased rubina;  decreased step length;  decreased stride length;  impaired balance;  60 feet;  straight cane          PM&R Impression: as above    Current Disposition Plan Recommendations:  d/c home with home physical therapy

## 2020-04-20 DIAGNOSIS — Z79.01 LONG TERM (CURRENT) USE OF ANTICOAGULANTS: ICD-10-CM

## 2020-04-20 DIAGNOSIS — J12.89 OTHER VIRAL PNEUMONIA: ICD-10-CM

## 2020-04-20 DIAGNOSIS — Z86.73 PERSONAL HISTORY OF TRANSIENT ISCHEMIC ATTACK (TIA), AND CEREBRAL INFARCTION WITHOUT RESIDUAL DEFICITS: ICD-10-CM

## 2020-04-20 DIAGNOSIS — U07.1 COVID-19: ICD-10-CM

## 2020-04-20 DIAGNOSIS — I48.91 UNSPECIFIED ATRIAL FIBRILLATION: ICD-10-CM

## 2020-04-20 DIAGNOSIS — Z79.52 LONG TERM (CURRENT) USE OF SYSTEMIC STEROIDS: ICD-10-CM

## 2020-04-20 DIAGNOSIS — A41.89 OTHER SPECIFIED SEPSIS: ICD-10-CM

## 2020-04-20 DIAGNOSIS — J44.0 CHRONIC OBSTRUCTIVE PULMONARY DISEASE WITH (ACUTE) LOWER RESPIRATORY INFECTION: ICD-10-CM

## 2020-04-20 DIAGNOSIS — E03.9 HYPOTHYROIDISM, UNSPECIFIED: ICD-10-CM

## 2020-04-20 DIAGNOSIS — E87.1 HYPO-OSMOLALITY AND HYPONATREMIA: ICD-10-CM

## 2020-04-20 DIAGNOSIS — J42 UNSPECIFIED CHRONIC BRONCHITIS: ICD-10-CM

## 2020-04-20 DIAGNOSIS — J15.6 PNEUMONIA DUE TO OTHER GRAM-NEGATIVE BACTERIA: ICD-10-CM

## 2020-04-20 DIAGNOSIS — E86.0 DEHYDRATION: ICD-10-CM

## 2020-04-20 DIAGNOSIS — I10 ESSENTIAL (PRIMARY) HYPERTENSION: ICD-10-CM

## 2020-04-30 PROCEDURE — 87486 CHLMYD PNEUM DNA AMP PROBE: CPT

## 2020-04-30 PROCEDURE — 80162 ASSAY OF DIGOXIN TOTAL: CPT

## 2020-04-30 PROCEDURE — 70450 CT HEAD/BRAIN W/O DYE: CPT

## 2020-04-30 PROCEDURE — 85025 COMPLETE CBC W/AUTO DIFF WBC: CPT

## 2020-04-30 PROCEDURE — 97116 GAIT TRAINING THERAPY: CPT

## 2020-04-30 PROCEDURE — 83880 ASSAY OF NATRIURETIC PEPTIDE: CPT

## 2020-04-30 PROCEDURE — 71045 X-RAY EXAM CHEST 1 VIEW: CPT

## 2020-04-30 PROCEDURE — 87633 RESP VIRUS 12-25 TARGETS: CPT

## 2020-04-30 PROCEDURE — 86480 TB TEST CELL IMMUN MEASURE: CPT

## 2020-04-30 PROCEDURE — 81001 URINALYSIS AUTO W/SCOPE: CPT

## 2020-04-30 PROCEDURE — 87040 BLOOD CULTURE FOR BACTERIA: CPT

## 2020-04-30 PROCEDURE — 83605 ASSAY OF LACTIC ACID: CPT

## 2020-04-30 PROCEDURE — 97161 PT EVAL LOW COMPLEX 20 MIN: CPT

## 2020-04-30 PROCEDURE — 93005 ELECTROCARDIOGRAM TRACING: CPT

## 2020-04-30 PROCEDURE — 87581 M.PNEUMON DNA AMP PROBE: CPT

## 2020-04-30 PROCEDURE — 83615 LACTATE (LD) (LDH) ENZYME: CPT

## 2020-04-30 PROCEDURE — 84145 PROCALCITONIN (PCT): CPT

## 2020-04-30 PROCEDURE — 82955 ASSAY OF G6PD ENZYME: CPT

## 2020-04-30 PROCEDURE — 87086 URINE CULTURE/COLONY COUNT: CPT

## 2020-04-30 PROCEDURE — 87798 DETECT AGENT NOS DNA AMP: CPT

## 2020-04-30 PROCEDURE — 86140 C-REACTIVE PROTEIN: CPT

## 2020-04-30 PROCEDURE — 85384 FIBRINOGEN ACTIVITY: CPT

## 2020-04-30 PROCEDURE — 85730 THROMBOPLASTIN TIME PARTIAL: CPT

## 2020-04-30 PROCEDURE — 80053 COMPREHEN METABOLIC PANEL: CPT

## 2020-04-30 PROCEDURE — 36415 COLL VENOUS BLD VENIPUNCTURE: CPT

## 2020-04-30 PROCEDURE — 85610 PROTHROMBIN TIME: CPT

## 2020-04-30 PROCEDURE — 71250 CT THORAX DX C-: CPT

## 2020-04-30 PROCEDURE — 94640 AIRWAY INHALATION TREATMENT: CPT

## 2020-04-30 PROCEDURE — 84443 ASSAY THYROID STIM HORMONE: CPT

## 2020-04-30 PROCEDURE — 84100 ASSAY OF PHOSPHORUS: CPT

## 2020-04-30 PROCEDURE — 82550 ASSAY OF CK (CPK): CPT

## 2020-04-30 PROCEDURE — 84484 ASSAY OF TROPONIN QUANT: CPT

## 2020-04-30 PROCEDURE — 82728 ASSAY OF FERRITIN: CPT

## 2020-04-30 PROCEDURE — 99285 EMERGENCY DEPT VISIT HI MDM: CPT

## 2020-04-30 PROCEDURE — 83735 ASSAY OF MAGNESIUM: CPT

## 2020-04-30 PROCEDURE — 82803 BLOOD GASES ANY COMBINATION: CPT

## 2020-04-30 PROCEDURE — 85379 FIBRIN DEGRADATION QUANT: CPT

## 2020-04-30 PROCEDURE — 87635 SARS-COV-2 COVID-19 AMP PRB: CPT

## 2020-05-04 ENCOUNTER — APPOINTMENT (OUTPATIENT)
Dept: PULMONOLOGY | Facility: CLINIC | Age: 85
End: 2020-05-04
Payer: MEDICARE

## 2020-05-04 PROBLEM — J18.9 PNA (PNEUMONIA): Status: ACTIVE | Noted: 2019-10-01

## 2020-05-04 PROCEDURE — 99443: CPT | Mod: CS,CR

## 2020-05-05 PROBLEM — J44.9 CHRONIC OBSTRUCTIVE PULMONARY DISEASE, UNSPECIFIED: Chronic | Status: ACTIVE | Noted: 2020-04-10

## 2020-05-05 PROBLEM — I63.9 CEREBRAL INFARCTION, UNSPECIFIED: Chronic | Status: ACTIVE | Noted: 2020-04-11

## 2020-05-05 NOTE — PLAN
[FreeTextEntry1] : COVID-19\par - Patient appears to be doing better since hospitalization, though still using rescue inhaler frequently 2-3x/day & says it helps a lot with her RAE. Afebrile. I think it would be appropriate for patient to follow up with Dr. Early for in-person office visit for physical, CXR & repeat labs & to ambulate without oxygen to ensure oxygen is not dropping significantly/determine the need for more continuous use of home oxygen. Discussed this in-depth with patient. I also gave her my direct number to call should she develop any new or worsening SOB, cough, fevers, chest tightness, etc. Will coordinate an office visit with Dr. Early. Patient to continue checking home oxygen in the meantime & let me know if it drops below 90%.

## 2020-05-05 NOTE — HISTORY OF PRESENT ILLNESS
[FreeTextEntry1] : 85 year old female with PMHx of afib on eliquis and digoxin, stroke a few  weeks ago, COPD, pseudomonas with recent hospitalization from 4/10/20-4/15/20. Patient had tMax of 103 and was saturating at 88%. Admitted for COVID-19. Positive test on 4/10, negative 4/13. Treated with azithromycin and Plaquenil for 5 days. Was on 2 L NC, and saturating 92%. Also given IV zosyn for pseudomonas pneumonia. Discharge labs notable for CRP 9.46, Ferritin 280 and D-Dimer <150.\par \par Using Spiriva once a day, Ventolin 2-3x/day for SOB. Mainly RAE (walking around apt). No SOB at rest. Checking oxygen at home and usually 91-94%, has been as low as 88% though. Not using nasal canula.Only used at night first few nights after being discharged. No worsening cough- same chronic cough but less phlegm. No wheezing, chest tightness, fevers, chest pain. Using Singulair at night. Overall feels much better since hospitalization but still with RAE. \par \par \par \par \par \par EXAM: CT CHEST \par \par PROCEDURE DATE: 04/10/2020 \par \par \par \par INTERPRETATION: CT SCAN OF CHEST \par \par History: Shortness of breath. Abnormal chest x-ray. \par \par Technique: CT scan of chest performed from lung apices through lung bases. \par Axial, coronal, and sagittal multiplanar reformatted images were produced. \par Thin section axial images and axial MIPS were also produced. Intravenous \par contrast material was not administered, as ordered. \par \par Comparison: Chest x-ray performed earlier on the same day. CT of chest \par 9/30/2019. CT angiogram chest 1/26/2017. \par \par Findings: Normal heart size. Small pericardial effusion. Coronary \par calcifications. Stable borderline enlarged mediastinal lymph nodes. No hilar \par or axillary lymphadenopathy. 4.3 cm aneurysmal dilatation of ascending \par aorta, without significant change. Unchanged metallic density in the left \par breast, possibly a biopsy clip. \par \par Moderate to severe pulmonary emphysema. Peribronchial and small subpleural \par dependent consolidations in both lower lobes. 6 mm nodule right upper lobe \par image 79 series 5 is unchanged since 9/30/2019. 5 mm nodule right upper lobe \par image 90 series 5 is also unchanged. 4 mm calcified granuloma right upper \par lobe image 126. 4 mm nodule left lower lobe image 185 series 5, unchanged. 3 \par mm calcified granuloma left lower lobe, unchanged. Small cluster of linear \par and nodular branching opacities in the right upper lobe as seen on prior \par studies, consistent with small airway disease. No pleural effusions. \par \par Stable small hepatic cysts in the upper abdomen. \par \par Degenerative changes of spine. \par \par \par IMPRESSION \par \par Pulmonary emphysema. Evidence of bronchitis and small airway disease. \par Subpleural opacities in both lower lobes could be due to atelectasis but \par cannot exclude small bacterial pneumonia. No typical appearance of COVID-19 \par infection. \par \par A few subcentimeter pulmonary nodules as above, unchanged since 9/30/2019. \par Recommend follow-up in 12 months. \par \par \par \par \par \par \par \par Thank you for the opportunity to participate in the care of this patient. \par \par \par \par NAVIN SAL M.D., ATTENDING RADIOLOGIST \par This document has been electronically signed. Apr 10 2020 7:51PM

## 2020-05-08 ENCOUNTER — APPOINTMENT (OUTPATIENT)
Dept: PULMONOLOGY | Facility: CLINIC | Age: 85
End: 2020-05-08
Payer: MEDICARE

## 2020-05-08 VITALS
OXYGEN SATURATION: 94 % | TEMPERATURE: 97.7 F | WEIGHT: 127 LBS | BODY MASS INDEX: 19.93 KG/M2 | SYSTOLIC BLOOD PRESSURE: 126 MMHG | HEIGHT: 67 IN | HEART RATE: 89 BPM | DIASTOLIC BLOOD PRESSURE: 82 MMHG

## 2020-05-08 DIAGNOSIS — J47.9 BRONCHIECTASIS, UNCOMPLICATED: ICD-10-CM

## 2020-05-08 PROCEDURE — 99215 OFFICE O/P EST HI 40 MIN: CPT | Mod: CS

## 2020-05-08 RX ORDER — LEVOFLOXACIN 500 MG/1
500 TABLET, FILM COATED ORAL DAILY
Qty: 7 | Refills: 0 | Status: COMPLETED | COMMUNITY
Start: 2019-11-26 | End: 2020-05-08

## 2020-05-08 RX ORDER — PREDNISONE 10 MG/1
10 TABLET ORAL
Qty: 40 | Refills: 0 | Status: COMPLETED | COMMUNITY
Start: 2020-02-10 | End: 2020-05-08

## 2020-07-20 ENCOUNTER — APPOINTMENT (OUTPATIENT)
Dept: PULMONOLOGY | Facility: CLINIC | Age: 85
End: 2020-07-20
Payer: MEDICARE

## 2020-07-20 VITALS
DIASTOLIC BLOOD PRESSURE: 80 MMHG | WEIGHT: 125 LBS | RESPIRATION RATE: 12 BRPM | TEMPERATURE: 98.3 F | SYSTOLIC BLOOD PRESSURE: 110 MMHG | BODY MASS INDEX: 19.62 KG/M2 | HEART RATE: 101 BPM | HEIGHT: 67 IN | OXYGEN SATURATION: 93 %

## 2020-07-20 DIAGNOSIS — U07.1 COVID-19: ICD-10-CM

## 2020-07-20 DIAGNOSIS — J47.1 BRONCHIECTASIS WITH (ACUTE) EXACERBATION: ICD-10-CM

## 2020-07-20 PROCEDURE — 99214 OFFICE O/P EST MOD 30 MIN: CPT | Mod: CS

## 2020-07-20 RX ORDER — PREDNISONE 20 MG/1
20 TABLET ORAL
Qty: 10 | Refills: 0 | Status: COMPLETED | COMMUNITY
Start: 2020-02-10

## 2020-07-20 NOTE — PHYSICAL EXAM
[No Acute Distress] : no acute distress [Normal Oropharynx] : normal oropharynx [Normal Rate/Rhythm] : normal rate/rhythm [Normal Appearance] : normal appearance [No Neck Mass] : no neck mass [No Murmurs] : no murmurs [Normal S1, S2] : normal s1, s2 [No Resp Distress] : no resp distress [No Abnormalities] : no abnormalities [Kyphosis] : kyphosis [No Clubbing] : no clubbing [No Cyanosis] : no cyanosis [No Edema] : no edema [FROM] : FROM [Normal Color/ Pigmentation] : normal color/ pigmentation [Normal Affect] : normal affect [No Focal Deficits] : no focal deficits [Oriented x3] : oriented x3 [TextBox_68] : few crackles at bases, R>L, do not clear w cough [TextBox_99] : walks w walker

## 2020-07-20 NOTE — ASSESSMENT
[FreeTextEntry1] : Covid-19 infection\par bronchiectasis\par \par Recovered well from acute Covid illness, mild changes if any on CT when admitted, no VTE.  Will give empirirc levaquin 7 d course for exac of bronchiectasis. Generally looks well.  No other changes

## 2020-07-20 NOTE — REASON FOR VISIT
[Follow-Up - From Hospitalization] : a follow-up visit after a recent hospitalization [COPD] : COPD [TextBox_44] : Covid-19

## 2020-07-20 NOTE — HISTORY OF PRESENT ILLNESS
[TextBox_4] : 05/08/2020 :  JEANETTE SHAW is a 85 year female who is here for Evaluation of dyspnea and cough. She was hospitalized for Covid infection from April 10 April 15, 2020. She was treated with Zithromax and Plaquenil. She had shortness of breath and high fever and swab was positive for Covid 19, however she had minimal changes on CAT scan of the chest other than prior changes of COPD and bronchiectasis. She has also been recently given a course of intravenous Zosyn and she tells me her cough now is actually better than it is usually. She does have some dyspnea on exertion. She has noted occasional mild oxygen desaturations with exercise, lowest about 88%.\par \par Reviewing her hospital chart she had a CRP of 9.46, no evidence of pulmonary embolism or DVT, D. dimers were normal.\par \par 7/20/2020: Since last seen tells me saturation better with exercise, feels generally OK but feels like she has sputum she cannot bring up and has reduced voice.  Has rec'd courses of antibiotics in past for exac of bronchiectasis, most recently Levaquin Feb 2020, tolerated well.

## 2020-10-26 ENCOUNTER — APPOINTMENT (OUTPATIENT)
Dept: PULMONOLOGY | Facility: CLINIC | Age: 85
End: 2020-10-26
Payer: MEDICARE

## 2020-10-26 VITALS
TEMPERATURE: 96.6 F | BODY MASS INDEX: 20.25 KG/M2 | DIASTOLIC BLOOD PRESSURE: 90 MMHG | HEART RATE: 91 BPM | OXYGEN SATURATION: 90 % | RESPIRATION RATE: 12 BRPM | WEIGHT: 129 LBS | SYSTOLIC BLOOD PRESSURE: 140 MMHG | HEIGHT: 67 IN

## 2020-10-26 PROCEDURE — 99213 OFFICE O/P EST LOW 20 MIN: CPT | Mod: 25

## 2020-10-26 RX ORDER — FOLIC ACID 1 MG/1
1 TABLET ORAL
Refills: 0 | Status: DISCONTINUED | COMMUNITY
End: 2020-10-26

## 2020-10-26 RX ORDER — DIGOXIN 125 UG/1
125 TABLET ORAL
Refills: 0 | Status: DISCONTINUED | COMMUNITY
End: 2020-10-26

## 2020-10-26 RX ORDER — LEVOFLOXACIN 750 MG/1
750 TABLET, FILM COATED ORAL DAILY
Qty: 7 | Refills: 0 | Status: DISCONTINUED | COMMUNITY
Start: 2020-02-10 | End: 2020-10-26

## 2020-10-26 RX ORDER — MOMETASONE FUROATE 200 UG/1
200 AEROSOL RESPIRATORY (INHALATION)
Refills: 0 | Status: COMPLETED | COMMUNITY
End: 2020-10-26

## 2020-10-26 RX ORDER — CEFDINIR 300 MG/1
300 CAPSULE ORAL
Qty: 14 | Refills: 0 | Status: DISCONTINUED | COMMUNITY
Start: 2020-01-10 | End: 2020-10-26

## 2020-10-26 NOTE — HISTORY OF PRESENT ILLNESS
[TextBox_4] : 10/26/2020 :  JEANETTE SHAW is a 85 year former smoker female with PMHx COPD, bronchiectasis, TIA, a.fib, covid -19 in April with few days hospitalization who is here for follow up. \par \par She has been using her  inhaler twice/day with minimum usage of albuterol. Her oxygenation at  is 93-96%. She has O2 at home but does not remember the last time she used it. She c/o SOB on walking within few steps. She was not able to exercise due to her sciatica and keen problem. She uses Mucinex on a daily basis. \par \par She is going to change her Eliquis to pradaxa after seeing her cardiologist. She also received her flu shot.\par \par Otherwise, she denies any new complains or diagnosis since the last visit.\par  \par \par

## 2020-10-26 NOTE — REVIEW OF SYSTEMS
[Cough] : cough [Sputum] : sputum [SOB on Exertion] : sob on exertion [Negative] : Endocrine [TextBox_30] : see HPI [TextBox_44] : see HPI

## 2020-10-26 NOTE — ASSESSMENT
[FreeTextEntry1] : bronchiectasis stable, minimal sputum production, on Spiriva and prn albuterol, no recent exacerbation.  Encouraged exercise, start using Aerobika to mobilize secretions BID.  f/u 3 months if OK

## 2020-10-26 NOTE — PHYSICAL EXAM
[No Acute Distress] : no acute distress [Normal Oropharynx] : normal oropharynx [Normal Appearance] : normal appearance [No Neck Mass] : no neck mass [No JVD] : no jvd [Normal Rate/Rhythm] : normal rate/rhythm [Normal S1, S2] : normal s1, s2 [No Resp Distress] : no resp distress [No Acc Muscle Use] : no acc muscle use [Clear to Auscultation Bilaterally] : clear to auscultation bilaterally [Kyphosis] : kyphosis [Normal Gait] : normal gait [Normal Color/ Pigmentation] : normal color/ pigmentation [No Focal Deficits] : no focal deficits [Oriented x3] : oriented x3 [Normal Mood] : normal mood [Normal Affect] : normal affect [TextBox_68] : distant  breath sounds

## 2020-11-24 NOTE — DISCHARGE NOTE PROVIDER - NSCORESITESY/N_GEN_A_CORE_RD
Provided SBIRT services: CRAFFT Score: 0-1 Moderate Risk/Brief Intervention Performed     Screening results were reviewed with the patient and patient was provided information about healthy behavior and potential negative consequences associated with substance use. Motivation and readiness to reduce or abstain from use was discussed and goals and activities to make changes were suggested and offered.  Provided guidance to avoid driving a car or riding in a car with an individual under the influence.     CRAFFT Score:   Duration = # 10 Minutes No

## 2021-05-24 ENCOUNTER — APPOINTMENT (OUTPATIENT)
Dept: PULMONOLOGY | Facility: CLINIC | Age: 86
End: 2021-05-24
Payer: MEDICARE

## 2021-05-24 VITALS
HEART RATE: 86 BPM | TEMPERATURE: 97 F | DIASTOLIC BLOOD PRESSURE: 83 MMHG | SYSTOLIC BLOOD PRESSURE: 149 MMHG | OXYGEN SATURATION: 92 % | HEIGHT: 67 IN | RESPIRATION RATE: 12 BRPM

## 2021-05-24 PROCEDURE — 94729 DIFFUSING CAPACITY: CPT

## 2021-05-24 PROCEDURE — 94060 EVALUATION OF WHEEZING: CPT

## 2021-05-24 PROCEDURE — 99215 OFFICE O/P EST HI 40 MIN: CPT | Mod: 25

## 2021-05-24 NOTE — PROCEDURE
[FreeTextEntry1] : pulmonary function testing today: severe obstruction with FEV1  0.66L (30%), DLCO 29%, unable to do N2 washout

## 2021-05-24 NOTE — HISTORY OF PRESENT ILLNESS
[TextBox_4] : 10/26/2020 :  JEANETTE SHAW is a 85 year former smoker female with PMHx COPD, bronchiectasis, TIA, a.fib, covid -19 in April 2020 with few days hospitalization who is here for follow up. \par \par 5/24/21:  minimum usage of albuterol. Her oxygenation at RA is 93-96%.  She c/o SOB on walking within few steps. She was not able to exercise due to her sciatica and knee problem. She uses Mucinex DM on a daily basis. No recent exacerbations\par \par She uses advair 500/50 BID, spriva once/day.  Cough with occasional thick sputum.  Rec'd Aerobika last visit but does not know how to use it. \par \par

## 2021-05-24 NOTE — ASSESSMENT
[FreeTextEntry1] : 87 y/o  with bronchiectasis who is here for follow up.  Showed how to use Arerobika, use at least 2/d AM and HS.  Severe but stable obstructive disease on LABA/ICS and LAMA.  No changes for now.

## 2021-05-24 NOTE — PHYSICAL EXAM
[No Acute Distress] : no acute distress [Well Groomed] : well groomed [Normal Oropharynx] : normal oropharynx [Normal Appearance] : normal appearance [No Neck Mass] : no neck mass [Normal Rate/Rhythm] : normal rate/rhythm [Normal S1, S2] : normal s1, s2 [No Murmurs] : no murmurs [No Resp Distress] : no resp distress [Clear to Auscultation Bilaterally] : clear to auscultation bilaterally [No Abnormalities] : no abnormalities [Benign] : benign [Normal Gait] : normal gait [No Clubbing] : no clubbing [No Cyanosis] : no cyanosis [No Edema] : no edema [FROM] : FROM [Normal Color/ Pigmentation] : normal color/ pigmentation [No Focal Deficits] : no focal deficits [Oriented x3] : oriented x3 [Normal Affect] : normal affect

## 2021-10-22 NOTE — PHYSICAL THERAPY INITIAL EVALUATION ADULT - STANDING BALANCE: DYNAMIC, REHAB EVAL
[facility] [address], [address]  P [phone]  F [phone]     Muriel Lesches      Dear MsSayda Lauri Yeh,      Please review your instructions with your nurse and ask any questions so you have all the information you need to recover well at home. If you do not feel you have everything you need to succeed and be safe after you leave the hospital, please discuss these concerns with your nurse. As always, call for any questions at home. Your doctor: Rebekah Pickens MD   Diagnosis: Vulvar lesion [N90.89]  Procedure: Procedure(s):  SIMPLE VULVECTOMY  Date of Discharge: 10/22/21       Take Home Medications     See Discharge Medication Review provided to you by your nurse. If you did not receive one, request this prior to your discharge. · It is important that you take your medications as they are prescribed. · Keep your medications in the bottles provided by the pharmacist and keep a list of the medication names, dosages, times to be taken and what they are for in your wallet. · Do not take other medications without consulting your doctor. Activity    · If possible, have someone with you at all times until you feel stable. · Gradually increase your activity each day. There are generally no restrictions on walking, climbing stairs or riding in a car. Try to walk at least 4 times per day. · Showers are okay. If you have an incision, no tub bathing/swimming for two weeks. Incision    · You should expect some discomfort in the area of your incision, particularly as you increase your activity. If you notice an area of increasing redness or new drainage, please call your doctor. Causes For Concern    If any of the following occur, please call our office and speak with the Nurse/aid who will help you with your problem or ask the doctor to call you.      Problems with the incision, including increasing pain, swelling, redness or drainage.  Inability to pass urine    Fever or chills and a temperature >101F   Constipation (no bowel movement for three days).  Diarrhea (more than three watery stools within 24 hours).  Excessive vaginal or wound bleeding.  If after hours and you cannot reach an on-call physician, call 911. Follow-Up    Call [phone] to schedule an appointment with Lupe Garza MD  in 6 weeks. Information obtained by :  I understand that if any problems occur once I am at home I am to contact my physician. I understand and acknowledge receipt of the instructions indicated above. Physician's or R.N.'s Signature                                                                  Date/Time                                                                                                                                              Patient or Representative Signature                                                          Date/Time      ______________________________________________________________________    Anesthesia Discharge Instructions    After general anesthesia or intervenous sedation, for 24 hours or while taking prescription Narcotics:  · Limit your activities  · Do not drive or operate hazardous machinery  · If you have not urinated within 8 hours after discharge, please contact your surgeon on call.   · Do not make important personal or business decisions  · Do not drink alcoholic beverages    Report the following to your surgeon:  · Excessive pain, swelling, redness or odor of or around the surgical area  · Temperature over 100.5 degrees  · Nausea and vomiting lasting longer than 4 hours or if unable to take medication  · Any signs of decreased circulation or nerve impairment to extremity:  Change in color, persistent numbness, tingling, coldness or increased pain.   · Any questions normal balance

## 2021-12-09 NOTE — H&P ADULT - PROBLEM SELECTOR PLAN 2
There was nothing on labs for concern on abdomen, except sig elevation of psa  If sx persistent should consider CT of abdomen (without contrast d/t kidney failure) currently afebrile, sating 98% on 4LC NC, WBC 22.8 with left shift. CXR revealed LLL infiltrate.  --Received Ceftriaxone 1g IV x 1 dose and Azithromycin 500mg IV x 1 dose in ED.  --will continue Ceftriaxone 1g IV q 12hrs and Azithromycin 250mg IV x 4 days.  --RVP negative/Influenza not detected, F/U blood cultures. currently afebrile, sating 98% on 4LC NC, WBC 22.8 with left shift. CXR revealed LLL infiltrate.  --Received Ceftriaxone 1g IV x 1 dose and Azithromycin 500mg PO x 1 dose in ED.  --will continue Ceftriaxone 1g IV q 24hrs and Azithromycin 250mg PO x 4 days.  --RVP negative/Influenza not detected, F/U blood cultures.

## 2021-12-27 ENCOUNTER — NON-APPOINTMENT (OUTPATIENT)
Age: 86
End: 2021-12-27

## 2021-12-30 ENCOUNTER — APPOINTMENT (OUTPATIENT)
Dept: PULMONOLOGY | Facility: CLINIC | Age: 86
End: 2021-12-30

## 2021-12-30 ENCOUNTER — APPOINTMENT (OUTPATIENT)
Dept: PULMONOLOGY | Facility: CLINIC | Age: 86
End: 2021-12-30
Payer: MEDICARE

## 2021-12-30 VITALS
SYSTOLIC BLOOD PRESSURE: 151 MMHG | RESPIRATION RATE: 12 BRPM | DIASTOLIC BLOOD PRESSURE: 82 MMHG | HEART RATE: 73 BPM | BODY MASS INDEX: 20.4 KG/M2 | TEMPERATURE: 96.6 F | OXYGEN SATURATION: 95 % | WEIGHT: 130 LBS | HEIGHT: 67 IN

## 2021-12-30 DIAGNOSIS — J47.9 BRONCHIECTASIS, UNCOMPLICATED: ICD-10-CM

## 2021-12-30 DIAGNOSIS — J44.9 CHRONIC OBSTRUCTIVE PULMONARY DISEASE, UNSPECIFIED: ICD-10-CM

## 2021-12-30 DIAGNOSIS — R05.9 COUGH, UNSPECIFIED: ICD-10-CM

## 2021-12-30 DIAGNOSIS — Z23 ENCOUNTER FOR IMMUNIZATION: ICD-10-CM

## 2021-12-30 PROCEDURE — 99214 OFFICE O/P EST MOD 30 MIN: CPT

## 2021-12-30 NOTE — ASSESSMENT
[FreeTextEntry1] : Bronchiectasis\par \par Had a long discussion with the patient and her daughter.  The patient basically has chronic bronchiectasis and cough is chronic in nature.  I informed the patient that she need to be up-to-date with immunization and she will find out if she had the pneumococcal vaccine or not.  She up-to-date with the flu and the Covid vaccination.  The condition is stable and there is no clinical evidence of exacerbation of the her bronchiectasis.  Patient needs more aggressive pulmonary toilet as I reviewed the CT scan and she has a mucus impaction in that from the CAT scan from May.  I started the patient on 3% saline which she was on it in 2019.  The instructed the patient to use the albuterol saline nebulizer and then to use the Aerobika twice a day at least for aggressive pulmonary toilet.  The patient is to monitor the amount and nature of her productive cough.\par \par I will repeat this CT scan of the chest to evaluate her abnormality is seen in the previous 1 with the mucus impaction.\par \par Dyspnea of exertion\par \par The baseline oxygen saturation is 95% on the ambulated the patient for about 40 feet and she did not desaturate.  Discussed the differential diagnosis of her dyspnea which could be, but not limited to, cardiac pulmonary or deconditioning.  The patient has arthritis which she could be also contributing to her distant exertion.  There is definitely pulmonary component to her dyspnea.  I encouraged the patient to do physical therapy and and and I given instruction how to do physical therapy and stationary and also she also to ambulate on daily basis.\par \par COPD I reviewed the PFT with the patient and she has severe obstructive lung disease with decrease in diffusion capacity response to bronchodilator.  The patient was on triple therapy at this point there is no indication for escalation of her therapy.

## 2021-12-30 NOTE — HISTORY OF PRESENT ILLNESS
[Former] : former [Never] : never [TextBox_4] : she is sob with slight exertion.  The knee hurts.  She is walking with a walker.  She has the cough and most of the time is dry but others brings up thick mucous that she has to pull out. No fever. NO chills.  No swelling of the legs.  No coughing blood.  She is using the nebulizer about twice a day

## 2021-12-30 NOTE — PHYSICAL EXAM
[No Acute Distress] : no acute distress [Normal Oropharynx] : normal oropharynx [Normal Appearance] : normal appearance [No Neck Mass] : no neck mass [Normal Rate/Rhythm] : normal rate/rhythm [Normal S1, S2] : normal s1, s2 [No Murmurs] : no murmurs [No Resp Distress] : no resp distress [No Abnormalities] : no abnormalities [Benign] : benign [Normal Gait] : normal gait [No Clubbing] : no clubbing [No Cyanosis] : no cyanosis [No Edema] : no edema [FROM] : FROM [Normal Color/ Pigmentation] : normal color/ pigmentation [No Focal Deficits] : no focal deficits [Oriented x3] : oriented x3 [Normal Affect] : normal affect [TextBox_68] : jamey at the bases L>R

## 2021-12-30 NOTE — REVIEW OF SYSTEMS
[Cough] : cough [Sputum] : sputum [SOB on Exertion] : sob on exertion [Negative] : Endocrine [Hemoptysis] : no hemoptysis [Chest Tightness] : no chest tightness [Frequent URIs] : no frequent URIs [Dyspnea] : no dyspnea [Pleuritic Pain] : no pleuritic pain [Wheezing] : no wheezing [A.M. Dry Mouth] : no a.m. dry mouth

## 2022-01-27 ENCOUNTER — APPOINTMENT (OUTPATIENT)
Dept: CT IMAGING | Facility: HOSPITAL | Age: 87
End: 2022-01-27

## 2022-01-27 ENCOUNTER — RESULT REVIEW (OUTPATIENT)
Age: 87
End: 2022-01-27

## 2022-01-27 ENCOUNTER — OUTPATIENT (OUTPATIENT)
Dept: OUTPATIENT SERVICES | Facility: HOSPITAL | Age: 87
LOS: 1 days | End: 2022-01-27
Payer: MEDICARE

## 2022-01-27 DIAGNOSIS — Z96.641 PRESENCE OF RIGHT ARTIFICIAL HIP JOINT: Chronic | ICD-10-CM

## 2022-01-27 PROCEDURE — 71250 CT THORAX DX C-: CPT | Mod: MD

## 2022-01-27 PROCEDURE — 71250 CT THORAX DX C-: CPT | Mod: 26,MD

## 2022-02-02 ENCOUNTER — TRANSCRIPTION ENCOUNTER (OUTPATIENT)
Age: 87
End: 2022-02-02

## 2022-03-15 ENCOUNTER — APPOINTMENT (OUTPATIENT)
Dept: HEART AND VASCULAR | Facility: CLINIC | Age: 87
End: 2022-03-15
Payer: MEDICARE

## 2022-03-15 ENCOUNTER — NON-APPOINTMENT (OUTPATIENT)
Age: 87
End: 2022-03-15

## 2022-03-15 VITALS
DIASTOLIC BLOOD PRESSURE: 67 MMHG | HEIGHT: 67 IN | BODY MASS INDEX: 20.4 KG/M2 | SYSTOLIC BLOOD PRESSURE: 130 MMHG | WEIGHT: 130 LBS | OXYGEN SATURATION: 95 % | HEART RATE: 80 BPM

## 2022-03-15 DIAGNOSIS — E03.9 HYPOTHYROIDISM, UNSPECIFIED: ICD-10-CM

## 2022-03-15 DIAGNOSIS — I10 ESSENTIAL (PRIMARY) HYPERTENSION: ICD-10-CM

## 2022-03-15 DIAGNOSIS — E78.5 HYPERLIPIDEMIA, UNSPECIFIED: ICD-10-CM

## 2022-03-15 DIAGNOSIS — I48.91 UNSPECIFIED ATRIAL FIBRILLATION: ICD-10-CM

## 2022-03-15 PROCEDURE — 93000 ELECTROCARDIOGRAM COMPLETE: CPT

## 2022-03-15 PROCEDURE — 99205 OFFICE O/P NEW HI 60 MIN: CPT | Mod: 25

## 2022-03-15 PROCEDURE — 36415 COLL VENOUS BLD VENIPUNCTURE: CPT

## 2022-03-15 RX ORDER — GUAIFENESIN 600 MG/1
600 TABLET, EXTENDED RELEASE ORAL
Qty: 30 | Refills: 0 | Status: DISCONTINUED | COMMUNITY
Start: 2020-02-21 | End: 2022-03-15

## 2022-03-15 RX ORDER — TIOTROPIUM BROMIDE INHALATION SPRAY 3.12 UG/1
2.5 SPRAY, METERED RESPIRATORY (INHALATION) DAILY
Qty: 1 | Refills: 0 | Status: ACTIVE | COMMUNITY

## 2022-03-15 RX ORDER — DM/PE/ACETAMINOPHEN/CHLORPHENR 10-5-325-2
25 MCG TABLET, SEQUENTIAL ORAL
Refills: 0 | Status: DISCONTINUED | COMMUNITY
End: 2022-03-15

## 2022-03-15 RX ORDER — MONTELUKAST 10 MG/1
10 TABLET, FILM COATED ORAL
Qty: 180 | Refills: 0 | Status: ACTIVE | COMMUNITY

## 2022-03-15 RX ORDER — INDAPAMIDE 1.25 MG/1
1.25 TABLET, FILM COATED ORAL DAILY
Refills: 0 | Status: ACTIVE | COMMUNITY

## 2022-03-15 RX ORDER — CANDESARTAN CILEXETIL 32 MG/1
32 TABLET ORAL
Refills: 0 | Status: ACTIVE | COMMUNITY

## 2022-03-15 RX ORDER — ALBUTEROL SULFATE 90 UG/1
108 (90 BASE) AEROSOL, METERED RESPIRATORY (INHALATION)
Qty: 18 | Refills: 3 | Status: ACTIVE | COMMUNITY

## 2022-03-15 RX ORDER — ATORVASTATIN CALCIUM 10 MG/1
10 TABLET, FILM COATED ORAL
Refills: 0 | Status: ACTIVE | COMMUNITY

## 2022-03-15 RX ORDER — APIXABAN 2.5 MG/1
2.5 TABLET, FILM COATED ORAL
Refills: 0 | Status: DISCONTINUED | COMMUNITY
End: 2022-03-15

## 2022-03-15 RX ORDER — MUCUS CLEARING DEVICE
EACH MISCELLANEOUS
Qty: 1 | Refills: 0 | Status: DISCONTINUED | COMMUNITY
Start: 2020-10-26 | End: 2022-03-15

## 2022-03-15 RX ORDER — SODIUM CHLORIDE FOR INHALATION 7 %
7 VIAL, NEBULIZER (ML) INHALATION
Qty: 2 | Refills: 2 | Status: DISCONTINUED | COMMUNITY
Start: 2019-11-26 | End: 2022-03-15

## 2022-03-15 RX ORDER — DIGOXIN 250 UG/1
250 TABLET ORAL DAILY
Qty: 90 | Refills: 0 | Status: ACTIVE | COMMUNITY

## 2022-03-16 LAB
ALBUMIN SERPL ELPH-MCNC: 4.4 G/DL
ALP BLD-CCNC: 60 U/L
ALT SERPL-CCNC: 8 U/L
ANION GAP SERPL CALC-SCNC: 15 MMOL/L
AST SERPL-CCNC: 23 U/L
BASOPHILS # BLD AUTO: 0.04 K/UL
BASOPHILS NFR BLD AUTO: 0.4 %
BILIRUB SERPL-MCNC: 1.2 MG/DL
BUN SERPL-MCNC: 14 MG/DL
CALCIUM SERPL-MCNC: 10 MG/DL
CHLORIDE SERPL-SCNC: 90 MMOL/L
CHOLEST SERPL-MCNC: 168 MG/DL
CO2 SERPL-SCNC: 26 MMOL/L
CREAT SERPL-MCNC: 0.66 MG/DL
EGFR: 86 ML/MIN/1.73M2
EOSINOPHIL # BLD AUTO: 0.09 K/UL
EOSINOPHIL NFR BLD AUTO: 0.9 %
ESTIMATED AVERAGE GLUCOSE: 108 MG/DL
GLUCOSE SERPL-MCNC: 95 MG/DL
HBA1C MFR BLD HPLC: 5.4 %
HCT VFR BLD CALC: 42.9 %
HDLC SERPL-MCNC: 76 MG/DL
HGB BLD-MCNC: 13.8 G/DL
IMM GRANULOCYTES NFR BLD AUTO: 0.5 %
LDLC SERPL CALC-MCNC: 72 MG/DL
LYMPHOCYTES # BLD AUTO: 1.16 K/UL
LYMPHOCYTES NFR BLD AUTO: 11.6 %
MAN DIFF?: NORMAL
MCHC RBC-ENTMCNC: 31.5 PG
MCHC RBC-ENTMCNC: 32.2 GM/DL
MCV RBC AUTO: 97.9 FL
MONOCYTES # BLD AUTO: 1.11 K/UL
MONOCYTES NFR BLD AUTO: 11.1 %
NEUTROPHILS # BLD AUTO: 7.52 K/UL
NEUTROPHILS NFR BLD AUTO: 75.5 %
NONHDLC SERPL-MCNC: 92 MG/DL
PLATELET # BLD AUTO: 294 K/UL
POTASSIUM SERPL-SCNC: 4.1 MMOL/L
PROT SERPL-MCNC: 6.8 G/DL
RBC # BLD: 4.38 M/UL
RBC # FLD: 13.6 %
SODIUM SERPL-SCNC: 131 MMOL/L
TRIGL SERPL-MCNC: 103 MG/DL
TSH SERPL-ACNC: 0.62 UIU/ML
WBC # FLD AUTO: 9.97 K/UL

## 2022-03-17 NOTE — HISTORY OF PRESENT ILLNESS
[FreeTextEntry1] : 87 yo female, former smoker, w/ PMHx of HTN, afib (on Pradaxa currently) arthritis, bronchiectasis and COPD presents today to establish care. \par \par She reports feeling well overall. She reports right knee pain limits her mobility; can walk, but uses wheelchair when out of her home. She has an aide who helps her. She does note significant RAE, but has long standing lung conditions so she feels her heart is ok. Patient denies any chest pain, palpitations, orthopnea, PND or LE edema.\par \par Former smoker: smoked in college until age 47; quit 35 years ago \par \par She has 3 children; no complications w/ pregnancies or postpartum. \par \par CT chest 1/27/2022: aneurysmal dilatation of the ascending aorta measuring up to 4.3 cm; descending thoracic aorta measure up to 3.2 cm- mildly ectatic; dilated pulmonary trunk measuring 3.7 cm; could represent pulmonary arterial hypertension \par \par TTE 9/28/2019: \par  1. There is mild symmetric left ventricular hypertrophy. There are no \par regional wall motion abnormalities seen. Left ventricular ejection \par fraction is 69.0 %.\par  2. Normal right ventricular size and systolic function.\par  3. No significant valvular disease.\par  4. There is mild pulmonary hypertension.\par  5. The ascending aorta appears mildly dilated with respect to aortic \par root size measuring 3.9 cm in diameter. The aortic arch appears normal.\par  6. No pericardial effusion\par \par CTA (CT angiography) of the CHEST dated 1/26/2017\par FINDINGS: No pulmonary embolism is seen. The pulmonary artery is dilated measuring 4.0 cm which can be seen in the setting of pulmonary arterial  hypertension. The thoracic aorta is normal in size. There is mild to moderate scattered calcified plaque. There is a bovine configuration to the aortic arch. The heart is normal in size. No pericardial effusion is seen. No mediastinal, hilar or axillary lymphadenopathy is seen.\par \par \par Of note, she mentions having some type of event in the past that caused her to "forget words," but that it wasn't a stroke. \par \par She was admitted to [facility] for 5 days in 4/2020 w/ pneumonia 2/2 COVID infection; discharge note mentions a stroke one week prior to the cerebral and abdoul region, no records available at this time. However, CT head done during COVID admission:\par - No acute intracranial hemorrhage or calvarial fracture.\par - Advanced small vessel ischemic change.\par \par \par \par

## 2022-03-17 NOTE — DISCUSSION/SUMMARY
[FreeTextEntry1] : 87 yo female, former smoker, w/ PMHx of HTN, afib (on Pradaxa currently) arthritis, bronchiectasis and COPD presents today to establish care. \par \par Afib:\par - will order TTE to be done in June w/ f/u appt \par - unable to add on Digoxin level today; will f/u after reviewing basic labs, no evidence of dig toxicity on EKG\par - will try to obtain records for a full picture of her previous medical history, but unclear whether she really bnenefits from digoxin at this time. Will reassess after next labs. \par \par ASCVD risk reduction:\par - BP well-controlled at this time\par - Ordered labs today to assess the efficacy of the current regimen. Will make adjustments as necessary depending on results- lipids and A1c\par - c/w current med regimen for now\par \par Possible prior CVA vs. TIA, vs. other neurologic event\par - The patient is already on a blood thinner and statin at this time with reasonable LDL control. Will consider increase to at least 20 mg of atorvastatin on next visit. \par \par Chronic Hyponatremia\par - Possibly secondary to indapamide. Will continue to monitor and can consider alternate agent in the future. \par \par RTC in 3 months for f/u and TTE

## 2022-03-20 NOTE — PHYSICAL THERAPY INITIAL EVALUATION ADULT - GAIT PATTERN USED, PT EVAL
No respiratory distress. No stridor, Lungs sounds clear with good aeration bilaterally. 3-point gait

## 2022-09-06 NOTE — H&P ADULT - PROBLEM SELECTOR PLAN 3
Richard will call when he is ready for injections   --continue Symbicort 160mcg 2 puffs BID, Singulair 10mg PO daily and Duonebs PRN.

## 2022-09-27 NOTE — PHYSICAL THERAPY INITIAL EVALUATION ADULT - LEVEL OF INDEPENDENCE: SCOOT/BRIDGE, REHAB EVAL
Spoke with patient's caregiver today in regard to smoking cessation progress for 12 month telephone follow up, she statespatient is not tobacco free. Patient told her he is not interested in returning to the program at this time.   Informed patient's caregiver of benefit period, future follow up, and contact information if any further help or support is needed.  Will complete smart form for 3/6/12 month follow up and resolve Quit attempt #1.      
independent

## 2023-02-17 ENCOUNTER — INPATIENT (INPATIENT)
Facility: HOSPITAL | Age: 88
LOS: 2 days | Discharge: HOME | DRG: 372 | End: 2023-02-20
Attending: STUDENT IN AN ORGANIZED HEALTH CARE EDUCATION/TRAINING PROGRAM | Admitting: STUDENT IN AN ORGANIZED HEALTH CARE EDUCATION/TRAINING PROGRAM
Payer: MEDICARE

## 2023-02-17 VITALS
WEIGHT: 130.07 LBS | SYSTOLIC BLOOD PRESSURE: 95 MMHG | HEIGHT: 67 IN | RESPIRATION RATE: 17 BRPM | TEMPERATURE: 98 F | OXYGEN SATURATION: 95 % | HEART RATE: 83 BPM | DIASTOLIC BLOOD PRESSURE: 62 MMHG

## 2023-02-17 DIAGNOSIS — K80.20 CALCULUS OF GALLBLADDER WITHOUT CHOLECYSTITIS WITHOUT OBSTRUCTION: ICD-10-CM

## 2023-02-17 DIAGNOSIS — E03.9 HYPOTHYROIDISM, UNSPECIFIED: ICD-10-CM

## 2023-02-17 DIAGNOSIS — E87.1 HYPO-OSMOLALITY AND HYPONATREMIA: ICD-10-CM

## 2023-02-17 DIAGNOSIS — Z87.09 PERSONAL HISTORY OF OTHER DISEASES OF THE RESPIRATORY SYSTEM: ICD-10-CM

## 2023-02-17 DIAGNOSIS — Z96.641 PRESENCE OF RIGHT ARTIFICIAL HIP JOINT: Chronic | ICD-10-CM

## 2023-02-17 DIAGNOSIS — Z29.9 ENCOUNTER FOR PROPHYLACTIC MEASURES, UNSPECIFIED: ICD-10-CM

## 2023-02-17 DIAGNOSIS — R93.89 ABNORMAL FINDINGS ON DIAGNOSTIC IMAGING OF OTHER SPECIFIED BODY STRUCTURES: ICD-10-CM

## 2023-02-17 DIAGNOSIS — A04.72 ENTEROCOLITIS DUE TO CLOSTRIDIUM DIFFICILE, NOT SPECIFIED AS RECURRENT: ICD-10-CM

## 2023-02-17 DIAGNOSIS — I48.91 UNSPECIFIED ATRIAL FIBRILLATION: ICD-10-CM

## 2023-02-17 LAB
ALBUMIN SERPL ELPH-MCNC: 3.1 G/DL — LOW (ref 3.3–5)
ALP SERPL-CCNC: 54 U/L — SIGNIFICANT CHANGE UP (ref 40–120)
ALT FLD-CCNC: 16 U/L — SIGNIFICANT CHANGE UP (ref 10–45)
ANION GAP SERPL CALC-SCNC: 10 MMOL/L — SIGNIFICANT CHANGE UP (ref 5–17)
APPEARANCE UR: CLEAR — SIGNIFICANT CHANGE UP
AST SERPL-CCNC: 22 U/L — SIGNIFICANT CHANGE UP (ref 10–40)
BACTERIA # UR AUTO: PRESENT /HPF
BASOPHILS # BLD AUTO: 0.04 K/UL — SIGNIFICANT CHANGE UP (ref 0–0.2)
BASOPHILS NFR BLD AUTO: 0.2 % — SIGNIFICANT CHANGE UP (ref 0–2)
BILIRUB SERPL-MCNC: 0.9 MG/DL — SIGNIFICANT CHANGE UP (ref 0.2–1.2)
BILIRUB UR-MCNC: NEGATIVE — SIGNIFICANT CHANGE UP
BLD GP AB SCN SERPL QL: NEGATIVE — SIGNIFICANT CHANGE UP
BUN SERPL-MCNC: 9 MG/DL — SIGNIFICANT CHANGE UP (ref 7–23)
C DIFF GDH STL QL: SIGNIFICANT CHANGE UP
C DIFF GDH STL QL: SIGNIFICANT CHANGE UP
CALCIUM SERPL-MCNC: 9.2 MG/DL — SIGNIFICANT CHANGE UP (ref 8.4–10.5)
CHLORIDE SERPL-SCNC: 95 MMOL/L — LOW (ref 96–108)
CO2 SERPL-SCNC: 26 MMOL/L — SIGNIFICANT CHANGE UP (ref 22–31)
COLOR SPEC: YELLOW — SIGNIFICANT CHANGE UP
CREAT SERPL-MCNC: 0.7 MG/DL — SIGNIFICANT CHANGE UP (ref 0.5–1.3)
DIFF PNL FLD: ABNORMAL
EGFR: 85 ML/MIN/1.73M2 — SIGNIFICANT CHANGE UP
EOSINOPHIL # BLD AUTO: 0.13 K/UL — SIGNIFICANT CHANGE UP (ref 0–0.5)
EOSINOPHIL NFR BLD AUTO: 0.7 % — SIGNIFICANT CHANGE UP (ref 0–6)
EPI CELLS # UR: SIGNIFICANT CHANGE UP /HPF (ref 0–5)
FLUAV AG NPH QL: SIGNIFICANT CHANGE UP
FLUBV AG NPH QL: SIGNIFICANT CHANGE UP
GI PCR PANEL: SIGNIFICANT CHANGE UP
GLUCOSE SERPL-MCNC: 101 MG/DL — HIGH (ref 70–99)
GLUCOSE UR QL: NEGATIVE — SIGNIFICANT CHANGE UP
HCT VFR BLD CALC: 34.4 % — LOW (ref 34.5–45)
HGB BLD-MCNC: 12 G/DL — SIGNIFICANT CHANGE UP (ref 11.5–15.5)
IMM GRANULOCYTES NFR BLD AUTO: 0.7 % — SIGNIFICANT CHANGE UP (ref 0–0.9)
KETONES UR-MCNC: NEGATIVE — SIGNIFICANT CHANGE UP
LACTATE SERPL-SCNC: 1.2 MMOL/L — SIGNIFICANT CHANGE UP (ref 0.5–2)
LEUKOCYTE ESTERASE UR-ACNC: NEGATIVE — SIGNIFICANT CHANGE UP
LIDOCAIN IGE QN: 20 U/L — SIGNIFICANT CHANGE UP (ref 7–60)
LYMPHOCYTES # BLD AUTO: 1.04 K/UL — SIGNIFICANT CHANGE UP (ref 1–3.3)
LYMPHOCYTES # BLD AUTO: 5.6 % — LOW (ref 13–44)
MCHC RBC-ENTMCNC: 33.1 PG — SIGNIFICANT CHANGE UP (ref 27–34)
MCHC RBC-ENTMCNC: 34.9 GM/DL — SIGNIFICANT CHANGE UP (ref 32–36)
MCV RBC AUTO: 94.8 FL — SIGNIFICANT CHANGE UP (ref 80–100)
MONOCYTES # BLD AUTO: 1.46 K/UL — HIGH (ref 0–0.9)
MONOCYTES NFR BLD AUTO: 7.9 % — SIGNIFICANT CHANGE UP (ref 2–14)
NEUTROPHILS # BLD AUTO: 15.79 K/UL — HIGH (ref 1.8–7.4)
NEUTROPHILS NFR BLD AUTO: 84.9 % — HIGH (ref 43–77)
NITRITE UR-MCNC: NEGATIVE — SIGNIFICANT CHANGE UP
NRBC # BLD: 0 /100 WBCS — SIGNIFICANT CHANGE UP (ref 0–0)
OB PNL STL: NEGATIVE — SIGNIFICANT CHANGE UP
OSMOLALITY UR: 455 MOSM/KG — SIGNIFICANT CHANGE UP (ref 300–900)
PH UR: 5.5 — SIGNIFICANT CHANGE UP (ref 5–8)
PLATELET # BLD AUTO: 374 K/UL — SIGNIFICANT CHANGE UP (ref 150–400)
POTASSIUM SERPL-MCNC: 3.7 MMOL/L — SIGNIFICANT CHANGE UP (ref 3.5–5.3)
POTASSIUM SERPL-SCNC: 3.7 MMOL/L — SIGNIFICANT CHANGE UP (ref 3.5–5.3)
PROT SERPL-MCNC: 5.5 G/DL — LOW (ref 6–8.3)
PROT UR-MCNC: NEGATIVE MG/DL — SIGNIFICANT CHANGE UP
RBC # BLD: 3.63 M/UL — LOW (ref 3.8–5.2)
RBC # FLD: 13.3 % — SIGNIFICANT CHANGE UP (ref 10.3–14.5)
RBC CASTS # UR COMP ASSIST: ABNORMAL /HPF
RH IG SCN BLD-IMP: POSITIVE — SIGNIFICANT CHANGE UP
RSV RNA NPH QL NAA+NON-PROBE: SIGNIFICANT CHANGE UP
SARS-COV-2 RNA SPEC QL NAA+PROBE: SIGNIFICANT CHANGE UP
SODIUM SERPL-SCNC: 131 MMOL/L — LOW (ref 135–145)
SODIUM UR-SCNC: 65 MMOL/L — SIGNIFICANT CHANGE UP
SP GR SPEC: 1.01 — SIGNIFICANT CHANGE UP (ref 1–1.03)
UROBILINOGEN FLD QL: 0.2 E.U./DL — SIGNIFICANT CHANGE UP
WBC # BLD: 18.59 K/UL — HIGH (ref 3.8–10.5)
WBC # FLD AUTO: 18.59 K/UL — HIGH (ref 3.8–10.5)
WBC UR QL: < 5 /HPF — SIGNIFICANT CHANGE UP

## 2023-02-17 PROCEDURE — 99222 1ST HOSP IP/OBS MODERATE 55: CPT | Mod: GC

## 2023-02-17 PROCEDURE — 99285 EMERGENCY DEPT VISIT HI MDM: CPT | Mod: FS

## 2023-02-17 PROCEDURE — 74177 CT ABD & PELVIS W/CONTRAST: CPT | Mod: 26,MA

## 2023-02-17 RX ORDER — TIOTROPIUM BROMIDE 18 UG/1
2 CAPSULE ORAL; RESPIRATORY (INHALATION) DAILY
Refills: 0 | Status: DISCONTINUED | OUTPATIENT
Start: 2023-02-17 | End: 2023-02-20

## 2023-02-17 RX ORDER — SODIUM CHLORIDE 9 MG/ML
1000 INJECTION INTRAMUSCULAR; INTRAVENOUS; SUBCUTANEOUS
Refills: 0 | Status: DISCONTINUED | OUTPATIENT
Start: 2023-02-17 | End: 2023-02-18

## 2023-02-17 RX ORDER — MORPHINE SULFATE 50 MG/1
2 CAPSULE, EXTENDED RELEASE ORAL ONCE
Refills: 0 | Status: DISCONTINUED | OUTPATIENT
Start: 2023-02-17 | End: 2023-02-17

## 2023-02-17 RX ORDER — LEVOTHYROXINE SODIUM 125 MCG
75 TABLET ORAL DAILY
Refills: 0 | Status: DISCONTINUED | OUTPATIENT
Start: 2023-02-17 | End: 2023-02-20

## 2023-02-17 RX ORDER — VANCOMYCIN HCL 1 G
125 VIAL (EA) INTRAVENOUS EVERY 6 HOURS
Refills: 0 | Status: DISCONTINUED | OUTPATIENT
Start: 2023-02-17 | End: 2023-02-20

## 2023-02-17 RX ORDER — DABIGATRAN ETEXILATE MESYLATE 150 MG/1
150 CAPSULE ORAL EVERY 12 HOURS
Refills: 0 | Status: DISCONTINUED | OUTPATIENT
Start: 2023-02-17 | End: 2023-02-20

## 2023-02-17 RX ORDER — VANCOMYCIN HCL 1 G
500 VIAL (EA) INTRAVENOUS ONCE
Refills: 0 | Status: COMPLETED | OUTPATIENT
Start: 2023-02-17 | End: 2023-02-17

## 2023-02-17 RX ORDER — MONTELUKAST 4 MG/1
10 TABLET, CHEWABLE ORAL DAILY
Refills: 0 | Status: DISCONTINUED | OUTPATIENT
Start: 2023-02-17 | End: 2023-02-20

## 2023-02-17 RX ORDER — METRONIDAZOLE 500 MG
500 TABLET ORAL ONCE
Refills: 0 | Status: COMPLETED | OUTPATIENT
Start: 2023-02-17 | End: 2023-02-17

## 2023-02-17 RX ORDER — DIATRIZOATE MEGLUMINE 180 MG/ML
30 INJECTION, SOLUTION INTRAVESICAL ONCE
Refills: 0 | Status: COMPLETED | OUTPATIENT
Start: 2023-02-17 | End: 2023-02-17

## 2023-02-17 RX ORDER — SODIUM CHLORIDE 9 MG/ML
1000 INJECTION, SOLUTION INTRAVENOUS
Refills: 0 | Status: DISCONTINUED | OUTPATIENT
Start: 2023-02-17 | End: 2023-02-17

## 2023-02-17 RX ORDER — BUDESONIDE AND FORMOTEROL FUMARATE DIHYDRATE 160; 4.5 UG/1; UG/1
2 AEROSOL RESPIRATORY (INHALATION)
Refills: 0 | Status: DISCONTINUED | OUTPATIENT
Start: 2023-02-17 | End: 2023-02-20

## 2023-02-17 RX ORDER — LOSARTAN POTASSIUM 100 MG/1
100 TABLET, FILM COATED ORAL DAILY
Refills: 0 | Status: DISCONTINUED | OUTPATIENT
Start: 2023-02-17 | End: 2023-02-17

## 2023-02-17 RX ORDER — ALBUTEROL 90 UG/1
2 AEROSOL, METERED ORAL EVERY 6 HOURS
Refills: 0 | Status: DISCONTINUED | OUTPATIENT
Start: 2023-02-17 | End: 2023-02-20

## 2023-02-17 RX ORDER — SODIUM CHLORIDE 9 MG/ML
1000 INJECTION INTRAMUSCULAR; INTRAVENOUS; SUBCUTANEOUS ONCE
Refills: 0 | Status: COMPLETED | OUTPATIENT
Start: 2023-02-17 | End: 2023-02-17

## 2023-02-17 RX ORDER — ATORVASTATIN CALCIUM 80 MG/1
40 TABLET, FILM COATED ORAL AT BEDTIME
Refills: 0 | Status: DISCONTINUED | OUTPATIENT
Start: 2023-02-17 | End: 2023-02-18

## 2023-02-17 RX ADMIN — SODIUM CHLORIDE 1000 MILLILITER(S): 9 INJECTION INTRAMUSCULAR; INTRAVENOUS; SUBCUTANEOUS at 12:43

## 2023-02-17 RX ADMIN — SODIUM CHLORIDE 1000 MILLILITER(S): 9 INJECTION INTRAMUSCULAR; INTRAVENOUS; SUBCUTANEOUS at 14:17

## 2023-02-17 RX ADMIN — DIATRIZOATE MEGLUMINE 30 MILLILITER(S): 180 INJECTION, SOLUTION INTRAVESICAL at 12:43

## 2023-02-17 RX ADMIN — Medication 500 MILLIGRAM(S): at 16:31

## 2023-02-17 RX ADMIN — SODIUM CHLORIDE 100 MILLILITER(S): 9 INJECTION INTRAMUSCULAR; INTRAVENOUS; SUBCUTANEOUS at 21:21

## 2023-02-17 RX ADMIN — Medication 100 MILLIGRAM(S): at 14:31

## 2023-02-17 RX ADMIN — MORPHINE SULFATE 2 MILLIGRAM(S): 50 CAPSULE, EXTENDED RELEASE ORAL at 12:43

## 2023-02-17 RX ADMIN — Medication 500 MILLIGRAM(S): at 15:18

## 2023-02-17 RX ADMIN — MORPHINE SULFATE 2 MILLIGRAM(S): 50 CAPSULE, EXTENDED RELEASE ORAL at 13:52

## 2023-02-17 NOTE — ED PROVIDER NOTE - CLINICAL SUMMARY MEDICAL DECISION MAKING FREE TEXT BOX
87 F pmh afib on pradaxa, prior CVA, COPD no home O2, hypothyroid p/w abd pain and diarrhea x 2 weeks.  no f/c.  took few days augmentin prior to sxs for ?CAP, started on cipro yesterday and received IV saline infusions last 2 days.  on exam vss, afebrile, comfortable appearing, Abd: nondistended, soft, diffuse lower abd ttp, no r/g, no mass, no cvat;  + melena noted in toilet bowl.  likely colitis, melena likely from pepto r/o GIB as on AC, consider ischemic bowel.  will obtain labs, stool studies, CT a/p and give ivf/analgesia

## 2023-02-17 NOTE — ED PROVIDER NOTE - NS ED ATTENDING STATEMENT MOD
This was a shared visit with the ROSSI. I reviewed and verified the documentation and independently performed the documented:

## 2023-02-17 NOTE — H&P ADULT - NSHPREVIEWOFSYSTEMS_GEN_ALL_CORE
Review of Systems:  GENERAL: No fever, night sweats, chills, fatigue  HEENT: No headache, changes in vision  CARDIOVASCULAR: No chest pain, palpitations, dyspnea on exertion  PULMONARY: No chest pain, shortness of breath, cough  GASTROINTESTINAL: +abdominal pain, +nausea, + diarrhea, +anorexia, no vomiting, constipation, hematochezia/melena  GENITOURINARY: No dysuria, urinary urgency, bladder incontinence.  MUSCULOSKELETAL: No arthralgias, muscle pain  NEUROLOGIC: No headache, weakness, dizziness, +history of CVA

## 2023-02-17 NOTE — ED ADULT NURSE REASSESSMENT NOTE - NS ED NURSE REASSESS COMMENT FT1
Patient a/oX 3, abdominal pain improved s/p Morphine 4mg IVP.  Vital signs stable.  No new diarrhea at this time.  NSS 1 L bolus completed;  Metronidazole, Vanco IVPB to follow.  CT scan pending.  Isolation protocol observed + Cdiff.  NPO observed.  STable and comfortable.

## 2023-02-17 NOTE — H&P ADULT - HISTORY OF PRESENT ILLNESS
87 F PMH AFib (on pradaxa), prior CVA w/ no residual deficits, COPD no home O2, hypothyroid p/w abd pain and diarrhea x 2 weeks. Patient has a history of chronic bronchitis and was treated with a 7 day course of amoxicillin, which ended on 2/5. Shortly after finishing her antibiotic course, patient developed profuse watery diarrhea. Patient describes multiple bowel movements in a day (5+), described as watery. She also has associated cramping, diffuse abdominal pain before bowel movement episodes. Diarrhea has been about the same and getting slightly worse over the past two weeks. Pt also with poor PO intake during this time, but no n/v. No blood noted in stool, but stools have been dark due to taking pepto bismol. Patient tried multiple agents ingluting imodium, pepto, gasex without relief. Outpt PCP prescribed cirpo 1 day ago, which patient took one dose of before coming to the hospital. Patient finally decided to present after 5 bowel movements yesterday and 7 overnight. Pt denies headache, changes in vision, dizziness, falls, shortness of breath, sick contacts or recent travel. Also denies dysuria or urinary frequency.    Initial vital signs: T: 98.4F/36.9C, HR: 83, BP: 95/62, R: 17, SpO2: 95% on RA  ED course:   Labs: significant for WBC 18.59 w/ neutrophil predominance, Na 131, Cl 95, albumin 3.1, lactate 1.2 wnl, lipase 20 wnl; MICRO: C diff positive, SARS/flu/RSV negative, GI PCR negative  Imaging:  - CTAP 2/17: 1. Diffuse proctosigmoiditis. 2. New mild intra and extra hepatic bile duct dilatation. Fluid distended gallbladder containing stone without pericholecystic fluid or wall thickening. Consider targeted gallbladder ultrasound correlation if concern for acute cholecystitis. Might consider MRCP. 3. New trace right pleural effusion.  EKG: none in ED  Medications: morphine 2mg IV, vancomycin 500 PO, flagyl 500mg IV, 1L NS  Consults: none

## 2023-02-17 NOTE — H&P ADULT - PROBLEM SELECTOR PLAN 4
Pt w/ h/o COPD. Home medications: fluticasone 500 salmeterol 50, spiriva 1.5 mcg, montelukast 10mg, methylpred 4mg?, nebulizer treatments  - therapeutic interchange w/ symbicort  - c/w home meds albuterol 90 q6 PRN, montelukast 10mg qD, spiriva 2.5ug 1 puffs qD  - f/u formal med rec Pt w/ h/o COPD. Home medications: fluticasone 500 salmeterol 50, spiriva 1.5 mcg, montelukast 10mg, methylpred 4mg?, nebulizer treatments?  - c/w home meds: therapeutic interchange w/ symbicort, albuterol 90 q6 PRN, montelukast 10mg qD, spiriva 2.5ug 1 puffs qD  - f/u formal med rec Pt w/ hyponatremia. Per previous records, has h/o chronic hyponatremia, previous admissions Na ranging 129-134 in April 2020. Pt also w/ slightly low chloride, likely from poor PO intake.  - f/u urine lytes  - encourage PO intake  - continue to monitor labs

## 2023-02-17 NOTE — H&P ADULT - PROBLEM SELECTOR PLAN 6
Pt w/ hyponatremia. Per previous records, has h/o chronic hyponatremia, previous admissions Na ranging 129-134 in April 2020. Pt also w/ slightly low chloride, likely from poor PO intake.  - encourage PO intake  - continue to monitor labs Pt w/ hyponatremia. Per previous records, has h/o chronic hyponatremia, previous admissions Na ranging 129-134 in April 2020. Pt also w/ slightly low chloride, likely from poor PO intake.  - f/u urine lytes  - encourage PO intake  - continue to monitor labs Pt w/ h/o COPD. Home medications: fluticasone 500 salmeterol 50, spiriva 1.5 mcg, montelukast 10mg, methylpred 4mg?, nebulizer treatments?  - c/w home meds: therapeutic interchange w/ symbicort, albuterol 90 q6 PRN, montelukast 10mg qD, spiriva 2.5ug 1 puffs qD  - f/u formal med rec Pt w/ h/o COPD. Bibasilar crackles on exam. Home medications: fluticasone 500 salmeterol 50, spiriva 1.5 mcg, montelukast 10mg, methylpred 4mg?, nebulizer treatments?  - c/w home meds: therapeutic interchange w/ symbicort, albuterol 90 q6 PRN, montelukast 10mg qD, spiriva 2.5ug 1 puffs qD  - f/u formal med rec

## 2023-02-17 NOTE — ED ADULT TRIAGE NOTE - CHIEF COMPLAINT QUOTE
Patient arrives via wheelchair c/o abd pain, diarrhea, nausea x 2-3 weeks; diarrhea is black - taking Pepto bismol.  IV to L FA, received IV hydration at home.

## 2023-02-17 NOTE — ED ADULT NURSE NOTE - OBJECTIVE STATEMENT
Patient w/ Hx of AFib on Pradax, CVA, COPD and hypothyroidism, states recently treated w/ PO antibiotics 3 weeks ago for respiratory infection/ cough, noted 1 week ago non-bloody diarrhea w/ episodes of abdominal cramping pain, no fever/chills, no nausea /vomitting.  Isolation observed r/o Cdiff.

## 2023-02-17 NOTE — H&P ADULT - PROBLEM SELECTOR PLAN 8
F: s/p 1L in ED  E: replete K<4, Mg<1.8, Phos<3, Ca<8.5   N: normal diet    VTE Prophylaxis: dabigatran 150 BID  GI: not needed  C: Full Code  D: RMF F: s/p 1L in ED, maintenance fluids  E: replete K<4, Mg<1.8, Phos<3, Ca<8.5   N: normal diet    VTE Prophylaxis: dabigatran 150 BID  GI: not needed  C: Full Code  D: RMF

## 2023-02-17 NOTE — H&P ADULT - ASSESSMENT
87 F PMH AFib (on pradaxa), prior CVA w/ no residual deficits, COPD no home O2, hypothyroid p/w abd pain and diarrhea x 2 weeks, found to be C diff positive, admitted for poor PO intake.

## 2023-02-17 NOTE — ED PROVIDER NOTE - OBJECTIVE STATEMENT
87 F pmh afib on pradaxa, prior CVA, COPD no home O2, hypothyroid p/w abd pain and diarrhea x 2 weeks. pt reports constant dull pressure to lower abdomen w/ loose watery black stools approx 5 episodes daily.  pt has been taking pepto bismol, imodium, mylanta w/o improvement.  reports last 2 days pt received home IV hydration and started on Cipro (took one dose) by PMD, told to come to ED today.  of note approx 3 weeks ago pt took few days of augmentin for ?CAP but dc after few days.  Denies f/c, headache, dizziness, fainting, chest pain, sob, palpitations, nv, dysuria/hematuria/urinary frequency/urgency, rash, travel, trauma.

## 2023-02-17 NOTE — H&P ADULT - PROBLEM SELECTOR PLAN 3
Pt w/ h/o AFib. Home medications: pradaxa 150mg BID, digoxin 250mcg?, indapamide 1.25?  - f/u EKG  - c/w home pradaxa  - f/u med rec Pt found to have stone in gallbladder on CT, though w/o gallbladder wall thickening. Normal alk phos, no transaminitis.  - f/u RUQUS  - serial abdominal exams

## 2023-02-17 NOTE — ED ADULT NURSE REASSESSMENT NOTE - NS ED NURSE REASSESS COMMENT FT1
Isolation observed + Cdiff.  Vital signs stable.  Metronidazole IVPB completed, Oral VAncomycin PO adminsitered, no adverse rxn.  CT scan done; results pending.  NPO observed.  For medicine/regional admit.  ENdorsement report and care received by SLOANE Dumont ED [facility].

## 2023-02-17 NOTE — H&P ADULT - NSHPLABSRESULTS_GEN_ALL_CORE
LABS:                         12.0   18.59 )-----------( 374      ( 17 Feb 2023 13:05 )             34.4     02-17    131<L>  |  95<L>  |  9   ----------------------------<  101<H>  3.7   |  26  |  0.70    Ca    9.2      17 Feb 2023 13:05    TPro  5.5<L>  /  Alb  3.1<L>  /  TBili  0.9  /  DBili  x   /  AST  22  /  ALT  16  /  AlkPhos  54  02-17              Lactate, Blood: 1.2 mmol/L (02-17 @ 13:05)      RADIOLOGY, EKG & ADDITIONAL TESTS: Reviewed.

## 2023-02-17 NOTE — H&P ADULT - NSHPPHYSICALEXAM_GEN_ALL_CORE
.  VITAL SIGNS:  T(C): 36.6 (02-17-23 @ 13:59), Max: 36.9 (02-17-23 @ 11:27)  T(F): 97.8 (02-17-23 @ 13:59), Max: 98.4 (02-17-23 @ 11:27)  HR: 75 (02-17-23 @ 15:36) (75 - 83)  BP: 128/72 (02-17-23 @ 15:36) (95/62 - 129/67)  BP(mean): --  RR: 16 (02-17-23 @ 15:36) (16 - 17)  SpO2: 95% (02-17-23 @ 15:36) (95% - 96%)  Wt(kg): --    PHYSICAL EXAM:    Constitutional: Resting comfortably in bed; NAD  Head: NC/AT  Eyes: EOMI, anicteric sclera  ENT: no nasal discharge; dry MM  Neck: supple  Respiratory: bibasilar crackles; no wheezes, ronchi, increased work of breathing, retractions  Cardiac: RRR; normal S1/S2, no MRG, no LE edema  Gastrointestinal: +BSx4, abdomen soft, tympanic to percussion, tender to light palpation; no rebound or guarding, no RUQ tenderness, negative Dan's sign  Genitourinary: no suprapubic tenderness  Extremities: WWPx4 extremitiess; no peripheral edema  Musculoskeletal: NROM x4; no joint swelling, tenderness or erythema  Vascular: 2+ radial pulses bilaterally  Dermatologic: skin warm, dry and intact; no rashes, open wounds  Neurologic: AAOx3; CNII-XII grossly intact; no focal deficits  Psychiatric: affect and characteristics of appearance, verbalizations, behaviors are appropriate

## 2023-02-17 NOTE — ED PROVIDER NOTE - PROGRESS NOTE DETAILS
labs w/ leukocytosis 18, .  UA no infection.  + cdiff.  FOB neg.  CT w/ 1.  Diffuse proctosigmoiditis.  2.  New mild intra and extra hepatic bile duct dilatation. Fluid distended gallbladder containing stone without pericholecystic fluid or wall thickening. 3.  New trace right pleural effusion.  pt has NO upper abd pain and normal bili/lfts, no c/f acute stacy.  give PO vanco/IV flagyl for cdiff colitis, received 1L IVF, will admit to medicine.

## 2023-02-17 NOTE — ED PROVIDER NOTE - ATTENDING APP SHARED VISIT CONTRIBUTION OF CARE
86 yo F with Afib on pradaxa, recent abx use presenting with diarrhea, dark, recent pepto bismol use with lower abdominal cramping. Pending labs, CT, stool studies, IV hydration, reassess.

## 2023-02-17 NOTE — H&P ADULT - ATTENDING COMMENTS
#Cdiff colitis: CTAP w/ proctosigmoiditis. Abd soft, no rebound. ¼ SIRS, for leukocytosis. c/w Vanc, f/up blood cx.    #Cholelithiasis: CTAP + GB stone w/ mild intrahepatic dilation. No evidence of cholcystitis. Neg murphys. F/up RUQ US. c/w serial exams,

## 2023-02-17 NOTE — PATIENT PROFILE ADULT - FALL HARM RISK - HARM RISK INTERVENTIONS

## 2023-02-17 NOTE — H&P ADULT - PROBLEM SELECTOR PLAN 2
CTAP on 2/17 found to have new mild intra and extra hepatic bile duct dilatation and fluid distended gallbladder containing stone without pericholecystic fluid or wall thickening. Patient without specific RUQ pain and negative Dan's sign on exam. No acute choledocolithiasis at this time.  - monitor for sxs CTAP on 2/17 found to have new mild intra and extra hepatic bile duct dilatation and fluid distended gallbladder containing stone without pericholecystic fluid or wall thickening. Patient without RUQ pain and negative Dan's sign on exam. Normal alk phos, no transaminitis. No c/f acute choledocholithiasis at this time.  - monitor for sxs  - f/u outpt #dilated intra/extrahepatic ducts  CTAP on 2/17 found to have new mild intra and extra hepatic bile duct dilatation and fluid distended gallbladder containing stone without pericholecystic fluid or wall thickening. Patient without RUQ pain and negative Dan's sign on exam. Normal alk phos, no transaminitis. No c/f acute choledocholithiasis at this time.  - f/u RUQUS  - monitor for sxs

## 2023-02-17 NOTE — ED PROVIDER NOTE - PHYSICAL EXAMINATION
Vitals reviewed  Gen: comfortable appearing elderly F, nad, speaking in full sentences  Skin: wwp, no rash/lesions  HEENT: ncat, eomi, mmm  CV: rrr, no audible m/r/g  Resp: symmetrical expansion, ctab, no w/r/r  Abd: nondistended, soft, diffuse lower abd ttp, no r/g, no mass, no cvat  Ext: FROM throughout, no peripheral edema  Neuro: alert/oriented, no focal deficits, steady gait

## 2023-02-17 NOTE — H&P ADULT - PROBLEM SELECTOR PLAN 1
#Diarrhea 2/2 Severe C diff  Pt presenting with profuse, watery diarrhea x2 weeks after 1wk course of oral antibiotic use, associated with diffuse abdominal pain and poor PO intake. Pt endorses black stools, but FOBT was negative in ED, likely 2/2 pepto bismol use. Pt found to be C diff positive. GI PCR negative and no recent travel. No subjective or documented fevers. Pt also found to have leukocytosis to 18 w/ neutrophil predominance, c/w severe C diff, though no elevated lactate or creatinine. CTAP negative for toxic megacolon. In the ED pt started on PO vanc and given 1L saline bolus.  - CTAP 2/17: diffuse proctosigmoiditis  Plan:  - c/w vanc 125 PO q6  - monitor for fevers  - encourage PO intake #Diarrhea 2/2 Severe C diff  Pt presenting with profuse, watery diarrhea x2 weeks after 1wk course of oral antibiotic use, associated with diffuse abdominal pain and poor PO intake. Pt endorses black stools, but FOBT was negative in ED, likely 2/2 pepto bismol use. Pt found to be C diff positive. GI PCR negative and no recent travel. No subjective or documented fevers. Pt also found to have leukocytosis to 18 w/ neutrophil predominance, c/w severe C diff, though no elevated lactate or creatinine. CTAP negative for toxic megacolon. In the ED pt started on PO vanc and given 1L saline bolus.  - CTAP 2/17: diffuse proctosigmoiditis  Plan:  - c/w vanc 125 PO q6  - monitor for fevers  - encourage PO intake  - can give zofran if persistent nausea #Diarrhea 2/2 Severe C diff  Pt presenting with profuse, watery diarrhea x2 weeks after 1wk course of oral antibiotic use, associated with diffuse abdominal pain and poor PO intake. Pt endorses black stools, but FOBT was negative in ED, likely 2/2 pepto bismol use. Pt found to be C diff positive. GI PCR negative and no recent travel. No subjective or documented fevers. Pt also found to have leukocytosis to 18 w/ neutrophil predominance, c/w severe C diff, though no elevated lactate or creatinine. CTAP negative for toxic megacolon. In the ED pt started on PO vanc and given 1L saline bolus.  - CTAP 2/17: diffuse proctosigmoiditis  Plan:  - c/w vanc 125 PO q6  - maintenance fluids 100cc/hr LR  - monitor for fevers  - encourage PO intake  - can give zofran if persistent nausea  - serial abdominal exams #Diarrhea 2/2 Severe C diff  Pt presenting with profuse, watery diarrhea x2 weeks after 1wk course of oral antibiotic use, associated with diffuse abdominal pain and poor PO intake. Pt endorses black stools, but FOBT was negative in ED, likely 2/2 pepto bismol use. Pt found to be C diff positive. GI PCR negative and no recent travel. No subjective or documented fevers. Pt also found to have leukocytosis to 18 w/ neutrophil predominance, c/w severe C diff, though no elevated lactate or creatinine. CTAP negative for toxic megacolon. In the ED pt started on PO vanc and given 1L saline bolus.  - CTAP 2/17: diffuse proctosigmoiditis  Plan:  - c/w vanc 125 PO q6  - maintenance fluids 100cc/hr LR (caution w/ additional fluids as pt has small R sided pleural effusion)  - monitor for fevers  - encourage PO intake  - can give zofran if persistent nausea  - serial abdominal exams

## 2023-02-17 NOTE — ED ADULT NURSE REASSESSMENT NOTE - NS ED NURSE REASSESS COMMENT FT1
Patient a/oX 3. Patient a/oX 3, anxious, c/o of diarrhea episodes, w/ abdominal cramping pain, no nausea/vomitting.  Had BM X 1 in ED, black tarry stool.  Vital signs stable.  Right FA pIV #20 in place, all labs sent, no complications.  NSS 1 L bolus ongoing, adminsitered Morphine 2 mg IVP, Oral contrast ongoing.  NPO observed.  CT scan pending.

## 2023-02-17 NOTE — H&P ADULT - PROBLEM SELECTOR PLAN 7
F: tolerating PO, no IVF  E: replete K<4, Mg<1.8, Phos<3, Ca<8.5   N: Normal Diet    VTE Prophylaxis: Lovenox 40 Q24H  GI: not needed  C: Full Code  D: RMF F: tolerating PO, no IVF  E: replete K<4, Mg<1.8, Phos<3, Ca<8.5   N: normal diet    VTE Prophylaxis: Lovenox 40 Q24H  GI: not needed  C: Full Code  D: RMF F: s/p 1L in ED  E: replete K<4, Mg<1.8, Phos<3, Ca<8.5   N: normal diet    VTE Prophylaxis: dabigatran 150 BID  GI: not needed  C: Full Code  D: RMF Pt w/ h/o hypothyroid. Home medications: levothyroxine 75 qD.  - f/u TSH  - c/w home meds  - f/u formal med rec    #HTN/HLD  Home medications: atorvastatin 10mg PO qD, candesartan 25 mg qD  - c/w atorvastatin  - hold candesartan (will be losartan therapeutic interchange) d/t low BP home readings i/s/o poor PO intake Pt w/ h/o hypothyroid. Home medications: levothyroxine 75 qD.  - f/u TSH  - c/w home meds  - f/u formal med rec    #HTN/HLD  Home medications: atorvastatin 40mg PO qD, candesartan 25 mg qD  - c/w atorvastatin 40 qD  - hold candesartan (will be losartan therapeutic interchange) d/t low BP home readings i/s/o poor PO intake

## 2023-02-17 NOTE — H&P ADULT - PROBLEM SELECTOR PLAN 5
Pt w/ h/o hypothyroid. Home medications: levothyroxine 75 qD.  - c/w home meds  - f/u formal med rec    #HTN/HLD  Donis emedications: atorvastatin 10mg PO qD, candesartan 25 mg qD  - hold therapeutic interchagne for candesartan  - c/w atorvastatin Pt w/ h/o hypothyroid. Home medications: levothyroxine 75 qD.  - f/u TSH  - c/w home meds  - f/u formal med rec    #HTN/HLD  Home medications: atorvastatin 10mg PO qD, candesartan 25 mg qD  - c/w atorvastatin  - hold candesartan (will be losartan therapeutic interchange) d/t low BP home readings i/s/o poor PO intake Pt w/ h/o AFib. Home medications: pradaxa 150mg BID, digoxin 250mcg?, indapamide 1.25?  - f/u EKG  - c/w home pradaxa  - f/u med rec

## 2023-02-18 LAB
ALBUMIN SERPL ELPH-MCNC: 3.2 G/DL — LOW (ref 3.3–5)
ALP SERPL-CCNC: 57 U/L — SIGNIFICANT CHANGE UP (ref 40–120)
ALT FLD-CCNC: 14 U/L — SIGNIFICANT CHANGE UP (ref 10–45)
ANION GAP SERPL CALC-SCNC: 9 MMOL/L — SIGNIFICANT CHANGE UP (ref 5–17)
AST SERPL-CCNC: 21 U/L — SIGNIFICANT CHANGE UP (ref 10–40)
BASOPHILS # BLD AUTO: 0.05 K/UL — SIGNIFICANT CHANGE UP (ref 0–0.2)
BASOPHILS NFR BLD AUTO: 0.3 % — SIGNIFICANT CHANGE UP (ref 0–2)
BILIRUB SERPL-MCNC: 0.7 MG/DL — SIGNIFICANT CHANGE UP (ref 0.2–1.2)
BUN SERPL-MCNC: 6 MG/DL — LOW (ref 7–23)
CALCIUM SERPL-MCNC: 9.1 MG/DL — SIGNIFICANT CHANGE UP (ref 8.4–10.5)
CHLORIDE SERPL-SCNC: 96 MMOL/L — SIGNIFICANT CHANGE UP (ref 96–108)
CO2 SERPL-SCNC: 28 MMOL/L — SIGNIFICANT CHANGE UP (ref 22–31)
CREAT SERPL-MCNC: 0.58 MG/DL — SIGNIFICANT CHANGE UP (ref 0.5–1.3)
EGFR: 89 ML/MIN/1.73M2 — SIGNIFICANT CHANGE UP
EOSINOPHIL # BLD AUTO: 0.18 K/UL — SIGNIFICANT CHANGE UP (ref 0–0.5)
EOSINOPHIL NFR BLD AUTO: 1.2 % — SIGNIFICANT CHANGE UP (ref 0–6)
GLUCOSE SERPL-MCNC: 105 MG/DL — HIGH (ref 70–99)
HCT VFR BLD CALC: 36.2 % — SIGNIFICANT CHANGE UP (ref 34.5–45)
HGB BLD-MCNC: 12 G/DL — SIGNIFICANT CHANGE UP (ref 11.5–15.5)
IMM GRANULOCYTES NFR BLD AUTO: 0.6 % — SIGNIFICANT CHANGE UP (ref 0–0.9)
LYMPHOCYTES # BLD AUTO: 1 K/UL — SIGNIFICANT CHANGE UP (ref 1–3.3)
LYMPHOCYTES # BLD AUTO: 6.7 % — LOW (ref 13–44)
MAGNESIUM SERPL-MCNC: 1.7 MG/DL — SIGNIFICANT CHANGE UP (ref 1.6–2.6)
MCHC RBC-ENTMCNC: 31.9 PG — SIGNIFICANT CHANGE UP (ref 27–34)
MCHC RBC-ENTMCNC: 33.1 GM/DL — SIGNIFICANT CHANGE UP (ref 32–36)
MCV RBC AUTO: 96.3 FL — SIGNIFICANT CHANGE UP (ref 80–100)
MONOCYTES # BLD AUTO: 0.91 K/UL — HIGH (ref 0–0.9)
MONOCYTES NFR BLD AUTO: 6.1 % — SIGNIFICANT CHANGE UP (ref 2–14)
NEUTROPHILS # BLD AUTO: 12.69 K/UL — HIGH (ref 1.8–7.4)
NEUTROPHILS NFR BLD AUTO: 85.1 % — HIGH (ref 43–77)
NRBC # BLD: 0 /100 WBCS — SIGNIFICANT CHANGE UP (ref 0–0)
PHOSPHATE SERPL-MCNC: 3 MG/DL — SIGNIFICANT CHANGE UP (ref 2.5–4.5)
PLATELET # BLD AUTO: 411 K/UL — HIGH (ref 150–400)
POTASSIUM SERPL-MCNC: 3.3 MMOL/L — LOW (ref 3.5–5.3)
POTASSIUM SERPL-SCNC: 3.3 MMOL/L — LOW (ref 3.5–5.3)
PROT SERPL-MCNC: 5.5 G/DL — LOW (ref 6–8.3)
RBC # BLD: 3.76 M/UL — LOW (ref 3.8–5.2)
RBC # FLD: 13.2 % — SIGNIFICANT CHANGE UP (ref 10.3–14.5)
SODIUM SERPL-SCNC: 133 MMOL/L — LOW (ref 135–145)
TSH SERPL-MCNC: 4.08 UIU/ML — SIGNIFICANT CHANGE UP (ref 0.27–4.2)
WBC # BLD: 14.92 K/UL — HIGH (ref 3.8–10.5)
WBC # FLD AUTO: 14.92 K/UL — HIGH (ref 3.8–10.5)

## 2023-02-18 PROCEDURE — 99233 SBSQ HOSP IP/OBS HIGH 50: CPT

## 2023-02-18 PROCEDURE — 76705 ECHO EXAM OF ABDOMEN: CPT | Mod: 26

## 2023-02-18 RX ORDER — CALCIUM CARBONATE 500(1250)
1 TABLET ORAL
Refills: 0 | Status: DISCONTINUED | OUTPATIENT
Start: 2023-02-18 | End: 2023-02-20

## 2023-02-18 RX ORDER — MAGNESIUM SULFATE 500 MG/ML
1 VIAL (ML) INJECTION ONCE
Refills: 0 | Status: COMPLETED | OUTPATIENT
Start: 2023-02-18 | End: 2023-02-18

## 2023-02-18 RX ORDER — ESCITALOPRAM OXALATE 10 MG/1
5 TABLET, FILM COATED ORAL DAILY
Refills: 0 | Status: DISCONTINUED | OUTPATIENT
Start: 2023-02-19 | End: 2023-02-20

## 2023-02-18 RX ORDER — DIGOXIN 250 MCG
250 TABLET ORAL DAILY
Refills: 0 | Status: DISCONTINUED | OUTPATIENT
Start: 2023-02-18 | End: 2023-02-20

## 2023-02-18 RX ORDER — SODIUM CHLORIDE 9 MG/ML
1000 INJECTION INTRAMUSCULAR; INTRAVENOUS; SUBCUTANEOUS
Refills: 0 | Status: DISCONTINUED | OUTPATIENT
Start: 2023-02-18 | End: 2023-02-19

## 2023-02-18 RX ORDER — ATORVASTATIN CALCIUM 80 MG/1
10 TABLET, FILM COATED ORAL AT BEDTIME
Refills: 0 | Status: DISCONTINUED | OUTPATIENT
Start: 2023-02-18 | End: 2023-02-20

## 2023-02-18 RX ORDER — POTASSIUM CHLORIDE 20 MEQ
40 PACKET (EA) ORAL EVERY 4 HOURS
Refills: 0 | Status: DISCONTINUED | OUTPATIENT
Start: 2023-02-18 | End: 2023-02-19

## 2023-02-18 RX ADMIN — ATORVASTATIN CALCIUM 40 MILLIGRAM(S): 80 TABLET, FILM COATED ORAL at 00:20

## 2023-02-18 RX ADMIN — MONTELUKAST 10 MILLIGRAM(S): 4 TABLET, CHEWABLE ORAL at 12:46

## 2023-02-18 RX ADMIN — Medication 125 MILLIGRAM(S): at 10:32

## 2023-02-18 RX ADMIN — Medication 250 MICROGRAM(S): at 19:22

## 2023-02-18 RX ADMIN — Medication 125 MILLIGRAM(S): at 17:34

## 2023-02-18 RX ADMIN — DABIGATRAN ETEXILATE MESYLATE 150 MILLIGRAM(S): 150 CAPSULE ORAL at 22:05

## 2023-02-18 RX ADMIN — Medication 125 MILLIGRAM(S): at 22:05

## 2023-02-18 RX ADMIN — SODIUM CHLORIDE 50 MILLILITER(S): 9 INJECTION INTRAMUSCULAR; INTRAVENOUS; SUBCUTANEOUS at 12:45

## 2023-02-18 RX ADMIN — ATORVASTATIN CALCIUM 10 MILLIGRAM(S): 80 TABLET, FILM COATED ORAL at 22:05

## 2023-02-18 RX ADMIN — BUDESONIDE AND FORMOTEROL FUMARATE DIHYDRATE 2 PUFF(S): 160; 4.5 AEROSOL RESPIRATORY (INHALATION) at 22:05

## 2023-02-18 RX ADMIN — BUDESONIDE AND FORMOTEROL FUMARATE DIHYDRATE 2 PUFF(S): 160; 4.5 AEROSOL RESPIRATORY (INHALATION) at 10:35

## 2023-02-18 RX ADMIN — TIOTROPIUM BROMIDE 2 PUFF(S): 18 CAPSULE ORAL; RESPIRATORY (INHALATION) at 10:33

## 2023-02-18 RX ADMIN — Medication 75 MICROGRAM(S): at 05:25

## 2023-02-18 RX ADMIN — DABIGATRAN ETEXILATE MESYLATE 150 MILLIGRAM(S): 150 CAPSULE ORAL at 10:32

## 2023-02-18 RX ADMIN — Medication 125 MILLIGRAM(S): at 04:50

## 2023-02-18 NOTE — PROGRESS NOTE ADULT - PROBLEM SELECTOR PLAN 3
Pt found to have stone in gallbladder on CT, though w/o gallbladder wall thickening. Normal alk phos, no transaminitis.  - see above for RUQ U/S findings  - serial abdominal exams

## 2023-02-18 NOTE — PROGRESS NOTE ADULT - ATTENDING COMMENTS
Patient was seen and examined at bedside. Case discuss with resident. Pt with some abdominal discomfort this morning.     OBJECTIVE:  Vital Signs Last 24 Hrs  T(C): 36.7 (2023 16:40), Max: 37.1 (2023 18:53)  T(F): 98.1 (2023 16:40), Max: 98.7 (2023 18:53)  HR: 78 (2023 16:40) (70 - 78)  BP: 117/74 (2023 16:40) (110/61 - 139/75)  BP(mean): --  RR: 17 (2023 16:40) (16 - 18)  SpO2: 95% (2023 16:40) (92% - 95%)    PE: NAD laying in bed  abdominal discomfort on palpation   Additional  exam as per resident note documented above     LABS:                        12.0   14.92 )-----------( 411      ( 2023 13:50 )             36.2     02-18    133<L>  |  96  |  6<L>  ----------------------------<  105<H>  3.3<L>   |  28  |  0.58    Ca    9.1      2023 13:50  Phos  3.0     02-18  Mg     1.7     -18    TPro  5.5<L>  /  Alb  3.2<L>  /  TBili  0.7  /  DBili  x   /  AST  21  /  ALT  14  /  AlkPhos  57  02-18    LIVER FUNCTIONS - ( 2023 13:50 )  Alb: 3.2 g/dL / Pro: 5.5 g/dL / ALK PHOS: 57 U/L / ALT: 14 U/L / AST: 21 U/L / GGT: x             US abdomen: Cholelithiasis and sludge visualized within the gallbladder. No evidence   of acute cholecystitis. Mild intra and extra hepatic bile duct dilatation. MRCP could be obtained   for further evaluation if clinically warranted.      CT abdomen: .  Diffuse proctosigmoiditis.  2.  New mild intra and extra hepatic bile duct dilatation. Fluid   distended gallbladder containing stone without pericholecystic fluid or   wall thickening. Consider targeted gallbladder ultrasound correlation if   concern for acute cholecystitis. Might consider MRCP.  3.  New trace right pleural effusion.    Urinalysis Basic - ( 2023 20:05 )  Color: Yellow / Appearance: Clear / S.010 / pH: x  Gluc: x / Ketone: NEGATIVE  / Bili: Negative / Urobili: 0.2 E.U./dL   Blood: x / Protein: NEGATIVE mg/dL / Nitrite: NEGATIVE   Leuk Esterase: NEGATIVE / RBC: 5-10 /HPF / WBC < 5 /HPF   Sq Epi: x / Non Sq Epi: 0-5 /HPF / Bacteria: Present /HPF      albuterol    90 MICROgram(s) HFA Inhaler 2 Puff(s) Inhalation every 6 hours PRN  atorvastatin 40 milliGRAM(s) Oral at bedtime  budesonide 160 MICROgram(s)/formoterol 4.5 MICROgram(s) Inhaler 2 Puff(s) Inhalation two times a day  dabigatran 150 milliGRAM(s) Oral every 12 hours  levothyroxine 75 MICROGram(s) Oral daily  montelukast 10 milliGRAM(s) Oral daily  sodium chloride 0.9%. 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    #C diff  - Will continue PO vancomycin  - Will f/u WBC  - Continue IVF    #Afib  #HLD  -Continue statin   -Continue dabigatran    #Hypothyroidism   -Continue Levothyroxine     #Mild intra and extra hepatic bile duct dilatation  -Will continue serial abdominal exam if exam worsens then will consult GI and possible obtain an MRCP.     #DISPO  - WIll d/c once clinically improved

## 2023-02-18 NOTE — PROGRESS NOTE ADULT - PROBLEM SELECTOR PROBLEM 5
Afib Will obtain labs, urine blood cultures, give IV Tylenol, IV antibiotics, obtain CT scan of abdomen and pelvis, reassess. Will obtain labs, urine, blood cultures, CT scan of abdomen and pelvis, give IV Tylenol, IV antibiotics, reassess.

## 2023-02-18 NOTE — PROGRESS NOTE ADULT - PROBLEM SELECTOR PLAN 5
Pt w/ h/o AFib. Home medications: pradaxa 150mg BID, digoxin 250mcg?, indapamide 1.25?  - f/u EKG  - c/w home pradaxa Pt w/ h/o AFib. Home medications: pradaxa 150mg BID, digoxin 250mcg, indapamide 1.25    - c/w home pradaxa and digoxin  - will speak to pharmacy about indapamide 1.25 TI

## 2023-02-18 NOTE — PROGRESS NOTE ADULT - PROBLEM SELECTOR PLAN 8
F: s/p 1L in ED, maintenance fluids  E: replete K<4, Mg<1.8, Phos<3, Ca<8.5   N: normal diet    VTE Prophylaxis: dabigatran 150 BID  GI: not needed  C: Full Code  D: RMF

## 2023-02-18 NOTE — PROGRESS NOTE ADULT - PROBLEM SELECTOR PLAN 1
#Diarrhea 2/2 Severe C diff  Pt presenting with profuse, watery diarrhea x2 weeks after 1wk course of oral antibiotic use, associated with diffuse abdominal pain and poor PO intake. Pt endorses black stools, but FOBT was negative in ED, likely 2/2 pepto bismol use. Pt found to be C diff positive. GI PCR negative and no recent travel. No subjective or documented fevers. Pt also found to have leukocytosis to 18 w/ neutrophil predominance, c/w severe C diff, though no elevated lactate or creatinine. CTAP negative for toxic megacolon. In the ED pt started on PO vanc and given 1L saline bolus.  - CTAP 2/17: diffuse proctosigmoiditis  Plan:  - encourage PO intake  - can give zofran if persistent nausea  - c/w vanc 125 PO q6  - patient reporting on 2/18 that she had one formed BM today  - started NS 50cc/hr for 6 hours

## 2023-02-18 NOTE — PROGRESS NOTE ADULT - PROBLEM SELECTOR PLAN 4
Pt w/ hyponatremia. Per previous records, has h/o chronic hyponatremia, previous admissions Na ranging 129-134 in April 2020. Pt also w/ slightly low chloride, likely from poor PO intake.  - f/u urine lytes  - encourage PO intake  - continue to monitor labs

## 2023-02-18 NOTE — PROGRESS NOTE ADULT - PROBLEM SELECTOR PLAN 6
Pt w/ h/o COPD. Bibasilar crackles on exam. Home medications: fluticasone 500 salmeterol 50, spiriva 1.5 mcg, montelukast 10mg, methylpred 4mg?, nebulizer treatments?  - c/w home meds: therapeutic interchange w/ symbicort, albuterol 90 q6 PRN, montelukast 10mg qD, spiriva 2.5ug 1 puffs qD Pt w/ h/o COPD. Bibasilar crackles on exam. Home medications: fluticasone 500 salmeterol 50, spiriva 1.5 mcg, montelukast 10mg  - c/w home meds: therapeutic interchange w/ symbicort, albuterol 90 q6 PRN, montelukast 10mg qD, spiriva 2.5ug 1 puffs qD

## 2023-02-18 NOTE — PROGRESS NOTE ADULT - PROBLEM SELECTOR PLAN 7
Pt w/ h/o hypothyroid. Home medications: levothyroxine 75 qD.  - f/u TSH  - c/w home meds    #HTN/HLD  Home medications: atorvastatin 40mg PO qD, candesartan 25 mg qD  - c/w atorvastatin 40 qD  - hold candesartan (will be losartan therapeutic interchange) d/t low BP home readings i/s/o poor PO intake

## 2023-02-18 NOTE — PROGRESS NOTE ADULT - PROBLEM SELECTOR PLAN 2
#dilated intra/extrahepatic ducts  CTAP on 2/17 found to have new mild intra and extra hepatic bile duct dilatation and fluid distended gallbladder containing stone without pericholecystic fluid or wall thickening. Patient without RUQ pain and negative Dan's sign on exam. Normal alk phos, no transaminitis. No c/f acute choledocholithiasis at this time.  - RUQ U/S showing evidence of Cholelithiasis and sludge visualized within the gallbladder. No evidence of acute cholecystitis. Mild intra and extra hepatic bile duct dilatation. MRCP could be obtained for further evaluation if clinically warranted.

## 2023-02-19 LAB
ANION GAP SERPL CALC-SCNC: 7 MMOL/L — SIGNIFICANT CHANGE UP (ref 5–17)
BASOPHILS # BLD AUTO: 0.04 K/UL — SIGNIFICANT CHANGE UP (ref 0–0.2)
BASOPHILS NFR BLD AUTO: 0.3 % — SIGNIFICANT CHANGE UP (ref 0–2)
BUN SERPL-MCNC: 7 MG/DL — SIGNIFICANT CHANGE UP (ref 7–23)
CALCIUM SERPL-MCNC: 8.6 MG/DL — SIGNIFICANT CHANGE UP (ref 8.4–10.5)
CHLORIDE SERPL-SCNC: 99 MMOL/L — SIGNIFICANT CHANGE UP (ref 96–108)
CO2 SERPL-SCNC: 27 MMOL/L — SIGNIFICANT CHANGE UP (ref 22–31)
CREAT SERPL-MCNC: 0.64 MG/DL — SIGNIFICANT CHANGE UP (ref 0.5–1.3)
CULTURE RESULTS: SIGNIFICANT CHANGE UP
EGFR: 87 ML/MIN/1.73M2 — SIGNIFICANT CHANGE UP
EOSINOPHIL # BLD AUTO: 0.21 K/UL — SIGNIFICANT CHANGE UP (ref 0–0.5)
EOSINOPHIL NFR BLD AUTO: 1.7 % — SIGNIFICANT CHANGE UP (ref 0–6)
GLUCOSE SERPL-MCNC: 101 MG/DL — HIGH (ref 70–99)
HCT VFR BLD CALC: 33.5 % — LOW (ref 34.5–45)
HGB BLD-MCNC: 11.1 G/DL — LOW (ref 11.5–15.5)
IMM GRANULOCYTES NFR BLD AUTO: 0.5 % — SIGNIFICANT CHANGE UP (ref 0–0.9)
LYMPHOCYTES # BLD AUTO: 0.97 K/UL — LOW (ref 1–3.3)
LYMPHOCYTES # BLD AUTO: 7.9 % — LOW (ref 13–44)
MAGNESIUM SERPL-MCNC: 2 MG/DL — SIGNIFICANT CHANGE UP (ref 1.6–2.6)
MCHC RBC-ENTMCNC: 32.1 PG — SIGNIFICANT CHANGE UP (ref 27–34)
MCHC RBC-ENTMCNC: 33.1 GM/DL — SIGNIFICANT CHANGE UP (ref 32–36)
MCV RBC AUTO: 96.8 FL — SIGNIFICANT CHANGE UP (ref 80–100)
MONOCYTES # BLD AUTO: 0.92 K/UL — HIGH (ref 0–0.9)
MONOCYTES NFR BLD AUTO: 7.5 % — SIGNIFICANT CHANGE UP (ref 2–14)
NEUTROPHILS # BLD AUTO: 10.04 K/UL — HIGH (ref 1.8–7.4)
NEUTROPHILS NFR BLD AUTO: 82.1 % — HIGH (ref 43–77)
NRBC # BLD: 0 /100 WBCS — SIGNIFICANT CHANGE UP (ref 0–0)
PHOSPHATE SERPL-MCNC: 2.9 MG/DL — SIGNIFICANT CHANGE UP (ref 2.5–4.5)
PLATELET # BLD AUTO: 355 K/UL — SIGNIFICANT CHANGE UP (ref 150–400)
POTASSIUM SERPL-MCNC: 4.6 MMOL/L — SIGNIFICANT CHANGE UP (ref 3.5–5.3)
POTASSIUM SERPL-SCNC: 4.6 MMOL/L — SIGNIFICANT CHANGE UP (ref 3.5–5.3)
RBC # BLD: 3.46 M/UL — LOW (ref 3.8–5.2)
RBC # FLD: 13.2 % — SIGNIFICANT CHANGE UP (ref 10.3–14.5)
SODIUM SERPL-SCNC: 133 MMOL/L — LOW (ref 135–145)
SPECIMEN SOURCE: SIGNIFICANT CHANGE UP
WBC # BLD: 12.24 K/UL — HIGH (ref 3.8–10.5)
WBC # FLD AUTO: 12.24 K/UL — HIGH (ref 3.8–10.5)

## 2023-02-19 PROCEDURE — 99233 SBSQ HOSP IP/OBS HIGH 50: CPT

## 2023-02-19 RX ORDER — POTASSIUM CHLORIDE 20 MEQ
40 PACKET (EA) ORAL ONCE
Refills: 0 | Status: COMPLETED | OUTPATIENT
Start: 2023-02-19 | End: 2023-02-19

## 2023-02-19 RX ADMIN — DABIGATRAN ETEXILATE MESYLATE 150 MILLIGRAM(S): 150 CAPSULE ORAL at 09:01

## 2023-02-19 RX ADMIN — DABIGATRAN ETEXILATE MESYLATE 150 MILLIGRAM(S): 150 CAPSULE ORAL at 21:32

## 2023-02-19 RX ADMIN — ATORVASTATIN CALCIUM 10 MILLIGRAM(S): 80 TABLET, FILM COATED ORAL at 21:31

## 2023-02-19 RX ADMIN — Medication 125 MILLIGRAM(S): at 21:31

## 2023-02-19 RX ADMIN — Medication 250 MICROGRAM(S): at 05:49

## 2023-02-19 RX ADMIN — Medication 40 MILLIEQUIVALENT(S): at 03:17

## 2023-02-19 RX ADMIN — BUDESONIDE AND FORMOTEROL FUMARATE DIHYDRATE 2 PUFF(S): 160; 4.5 AEROSOL RESPIRATORY (INHALATION) at 21:32

## 2023-02-19 RX ADMIN — Medication 125 MILLIGRAM(S): at 15:40

## 2023-02-19 RX ADMIN — Medication 125 MILLIGRAM(S): at 09:01

## 2023-02-19 RX ADMIN — Medication 1 TABLET(S): at 14:40

## 2023-02-19 RX ADMIN — Medication 75 MICROGRAM(S): at 05:49

## 2023-02-19 RX ADMIN — Medication 100 GRAM(S): at 00:53

## 2023-02-19 RX ADMIN — ESCITALOPRAM OXALATE 5 MILLIGRAM(S): 10 TABLET, FILM COATED ORAL at 11:58

## 2023-02-19 RX ADMIN — Medication 40 MILLIEQUIVALENT(S): at 00:53

## 2023-02-19 RX ADMIN — MONTELUKAST 10 MILLIGRAM(S): 4 TABLET, CHEWABLE ORAL at 11:58

## 2023-02-19 RX ADMIN — TIOTROPIUM BROMIDE 2 PUFF(S): 18 CAPSULE ORAL; RESPIRATORY (INHALATION) at 09:01

## 2023-02-19 RX ADMIN — Medication 125 MILLIGRAM(S): at 03:17

## 2023-02-19 NOTE — PROGRESS NOTE ADULT - SUBJECTIVE AND OBJECTIVE BOX
Patient was seen and examined at bedside. Case discuss with resident. Pt reports that her diarrhea is resolved.     OBJECTIVE:  Vital Signs Last 24 Hrs  T(C): 36.7 (2023 09:00), Max: 36.9 (2023 20:58)  T(F): 98.1 (2023 09:00), Max: 98.4 (2023 20:58)  HR: 75 (2023 09:00) (69 - 78)  BP: 149/78 (2023 09:00) (117/74 - 151/83)  BP(mean): 90 (2023 20:58) (90 - 90)  RR: 18 (2023 09:00) (17 - 18)  SpO2: 94% (2023 09:00) (93% - 95%)    Parameters below as of 2023 09:00  Patient On (Oxygen Delivery Method): room air      PHYSICAL EXAM:  Gen: NAD laying in bed  CV: RRR, +S1/S2, no mumur  Pulm: CTA b/l no wheezing or crackles   Abd: soft, mild tenderness no rebound or guarding       LABS:                        11.1   12.24 )-----------( 355      ( 2023 07:38 )             33.5     02-19    133<L>  |  99  |  7   ----------------------------<  101<H>  4.6   |  27  |  0.64    Ca    8.6      2023 07:38  Phos  2.9     02-19  Mg     2.0     02-19    TPro  5.5<L>  /  Alb  3.2<L>  /  TBili  0.7  /  DBili  x   /  AST  21  /  ALT  14  /  AlkPhos  57  02-18    LIVER FUNCTIONS - ( 2023 13:50 )  Alb: 3.2 g/dL / Pro: 5.5 g/dL / ALK PHOS: 57 U/L / ALT: 14 U/L / AST: 21 U/L / GGT: x             Urinalysis Basic - ( 2023 20:05 )  Color: Yellow / Appearance: Clear / S.010 / pH: x  Gluc: x / Ketone: NEGATIVE  / Bili: Negative / Urobili: 0.2 E.U./dL   Blood: x / Protein: NEGATIVE mg/dL / Nitrite: NEGATIVE   Leuk Esterase: NEGATIVE / RBC: 5-10 /HPF / WBC < 5 /HPF   Sq Epi: x / Non Sq Epi: 0-5 /HPF / Bacteria: Present /HPF    albuterol    90 MICROgram(s) HFA Inhaler 2 Puff(s) Inhalation every 6 hours PRN  atorvastatin 10 milliGRAM(s) Oral at bedtime  budesonide 160 MICROgram(s)/formoterol 4.5 MICROgram(s) Inhaler 2 Puff(s) Inhalation two times a day  calcium carbonate    500 mG (Tums) Chewable 1 Tablet(s) Chew four times a day PRN  dabigatran 150 milliGRAM(s) Oral every 12 hours  digoxin     Tablet 250 MICROGram(s) Oral daily  escitalopram 5 milliGRAM(s) Oral daily  levothyroxine 75 MICROGram(s) Oral daily  montelukast 10 milliGRAM(s) Oral daily  sodium chloride 0.9%. 1000 milliLiter(s) IV Continuous <Continuous>  tiotropium 2.5 MICROgram(s) Inhaler 2 Puff(s) Inhalation daily  vancomycin    Solution 125 milliGRAM(s) Oral every 6 hours              
O/N Events: ora    Subjective/ROS: Patient seen and examined at bedside.     Denies Fever/Chills, HA, CP, SOB, n/v, changes in bowel/urinary habits.  12pt ROS otherwise negative.    VITALS  Vital Signs Last 24 Hrs  T(C): 36.9 (2023 20:58), Max: 36.9 (2023 20:58)  T(F): 98.4 (2023 20:58), Max: 98.4 (2023 20:58)  HR: 69 (2023 20:58) (69 - 78)  BP: 119/75 (2023 20:58) (110/61 - 130/73)  BP(mean): 90 (2023 20:58) (90 - 90)  RR: 18 (2023 20:58) (17 - 18)  SpO2: 93% (2023 20:58) (93% - 95%)    Parameters below as of 2023 20:58  Patient On (Oxygen Delivery Method): room air    CAPILLARY BLOOD GLUCOSE    PHYSICAL EXAM  General: NAD, sitting up in bed with family at bedside   Head: NC/AT; MMM  Neck: Supple; no JVD  Respiratory: CTAB; no wheezes/rales/rhonchi  Cardiovascular: Regular rhythm/rate; S1/S2+, no murmurs, rubs gallops   Gastrointestinal: Soft; NTND; bowel sounds normal and present, abdominal pain resolving, patient with only 1 formed BM today  Extremities: WWP; no edema/cyanosis  Neurological: A&Ox4    MEDICATIONS  (STANDING):  atorvastatin 10 milliGRAM(s) Oral at bedtime  budesonide 160 MICROgram(s)/formoterol 4.5 MICROgram(s) Inhaler 2 Puff(s) Inhalation two times a day  dabigatran 150 milliGRAM(s) Oral every 12 hours  digoxin     Tablet 250 MICROGram(s) Oral daily  escitalopram 5 milliGRAM(s) Oral daily  levothyroxine 75 MICROGram(s) Oral daily  montelukast 10 milliGRAM(s) Oral daily  potassium chloride   Powder 40 milliEquivalent(s) Oral every 4 hours  sodium chloride 0.9%. 1000 milliLiter(s) (50 mL/Hr) IV Continuous <Continuous>  tiotropium 2.5 MICROgram(s) Inhaler 2 Puff(s) Inhalation daily  vancomycin    Solution 125 milliGRAM(s) Oral every 6 hours    MEDICATIONS  (PRN):  albuterol    90 MICROgram(s) HFA Inhaler 2 Puff(s) Inhalation every 6 hours PRN Shortness of Breath and/or Wheezing  calcium carbonate    500 mG (Tums) Chewable 1 Tablet(s) Chew four times a day PRN Heartburn      Mustard (Anaphylaxis)  No Known Drug Allergies      LABS                        12.0   14.92 )-----------( 411      ( 2023 13:50 )             36.2     02-18    133<L>  |  96  |  6<L>  ----------------------------<  105<H>  3.3<L>   |  28  |  0.58    Ca    9.1      2023 13:50  Phos  3.0     02-18  Mg     1.7     -18    TPro  5.5<L>  /  Alb  3.2<L>  /  TBili  0.7  /  DBili  x   /  AST  21  /  ALT  14  /  AlkPhos  57  02-18      Urinalysis Basic - ( 2023 20:05 )    Color: Yellow / Appearance: Clear / S.010 / pH: x  Gluc: x / Ketone: NEGATIVE  / Bili: Negative / Urobili: 0.2 E.U./dL   Blood: x / Protein: NEGATIVE mg/dL / Nitrite: NEGATIVE   Leuk Esterase: NEGATIVE / RBC: 5-10 /HPF / WBC < 5 /HPF   Sq Epi: x / Non Sq Epi: 0-5 /HPF / Bacteria: Present /HPF              IMAGING/EKG/ETC

## 2023-02-19 NOTE — PROGRESS NOTE ADULT - ASSESSMENT
A/P: 87 F PMH AFib (on pradaxa), prior CVA w/ no residual deficits, COPD no home O2, hypothyroidism  p/w abd pain and diarrhea x 2 weeks, found to be C diff positive.    #C diff  - Will continue PO vancomycin  - Will f/u WBC improved from 14->12   - Continue to encourage PO intake     #Afib  #HLD  #Hx of CVA w/o resdidual deficits   -Continue statin   -Continue dabigatran    #Hypothyroidism   -Continue Levothyroxine     #Mild intra and extra hepatic bile duct dilatation  -Will continue serial abdominal exam and pt for outpatient  GI evaluation     #DISPO   -Possible d/c tomorrow if clinically improved with outpatient PMD and GI f/u
87 F PMH AFib (on pradaxa), prior CVA w/ no residual deficits, COPD no home O2, hypothyroid p/w abd pain and diarrhea x 2 weeks, found to be C diff positive, admitted for poor PO intake.

## 2023-02-20 ENCOUNTER — TRANSCRIPTION ENCOUNTER (OUTPATIENT)
Age: 88
End: 2023-02-20

## 2023-02-20 VITALS
RESPIRATION RATE: 18 BRPM | TEMPERATURE: 97 F | SYSTOLIC BLOOD PRESSURE: 159 MMHG | HEART RATE: 76 BPM | DIASTOLIC BLOOD PRESSURE: 83 MMHG | OXYGEN SATURATION: 96 %

## 2023-02-20 LAB
ANION GAP SERPL CALC-SCNC: 8 MMOL/L — SIGNIFICANT CHANGE UP (ref 5–17)
BUN SERPL-MCNC: 6 MG/DL — LOW (ref 7–23)
CALCIUM SERPL-MCNC: 8.8 MG/DL — SIGNIFICANT CHANGE UP (ref 8.4–10.5)
CHLORIDE SERPL-SCNC: 99 MMOL/L — SIGNIFICANT CHANGE UP (ref 96–108)
CO2 SERPL-SCNC: 26 MMOL/L — SIGNIFICANT CHANGE UP (ref 22–31)
CREAT SERPL-MCNC: 0.56 MG/DL — SIGNIFICANT CHANGE UP (ref 0.5–1.3)
EGFR: 89 ML/MIN/1.73M2 — SIGNIFICANT CHANGE UP
GLUCOSE SERPL-MCNC: 98 MG/DL — SIGNIFICANT CHANGE UP (ref 70–99)
HCT VFR BLD CALC: 34.1 % — LOW (ref 34.5–45)
HGB BLD-MCNC: 11.4 G/DL — LOW (ref 11.5–15.5)
MAGNESIUM SERPL-MCNC: 1.8 MG/DL — SIGNIFICANT CHANGE UP (ref 1.6–2.6)
MCHC RBC-ENTMCNC: 31.8 PG — SIGNIFICANT CHANGE UP (ref 27–34)
MCHC RBC-ENTMCNC: 33.4 GM/DL — SIGNIFICANT CHANGE UP (ref 32–36)
MCV RBC AUTO: 95.3 FL — SIGNIFICANT CHANGE UP (ref 80–100)
NRBC # BLD: 0 /100 WBCS — SIGNIFICANT CHANGE UP (ref 0–0)
PHOSPHATE SERPL-MCNC: 2.8 MG/DL — SIGNIFICANT CHANGE UP (ref 2.5–4.5)
PLATELET # BLD AUTO: 388 K/UL — SIGNIFICANT CHANGE UP (ref 150–400)
POTASSIUM SERPL-MCNC: 4.3 MMOL/L — SIGNIFICANT CHANGE UP (ref 3.5–5.3)
POTASSIUM SERPL-SCNC: 4.3 MMOL/L — SIGNIFICANT CHANGE UP (ref 3.5–5.3)
RBC # BLD: 3.58 M/UL — LOW (ref 3.8–5.2)
RBC # FLD: 13.2 % — SIGNIFICANT CHANGE UP (ref 10.3–14.5)
SODIUM SERPL-SCNC: 133 MMOL/L — LOW (ref 135–145)
WBC # BLD: 10.45 K/UL — SIGNIFICANT CHANGE UP (ref 3.8–10.5)
WBC # FLD AUTO: 10.45 K/UL — SIGNIFICANT CHANGE UP (ref 3.8–10.5)

## 2023-02-20 PROCEDURE — 85025 COMPLETE CBC W/AUTO DIFF WBC: CPT

## 2023-02-20 PROCEDURE — 80053 COMPREHEN METABOLIC PANEL: CPT

## 2023-02-20 PROCEDURE — 86900 BLOOD TYPING SEROLOGIC ABO: CPT

## 2023-02-20 PROCEDURE — 83605 ASSAY OF LACTIC ACID: CPT

## 2023-02-20 PROCEDURE — 82272 OCCULT BLD FECES 1-3 TESTS: CPT

## 2023-02-20 PROCEDURE — 86901 BLOOD TYPING SEROLOGIC RH(D): CPT

## 2023-02-20 PROCEDURE — 96375 TX/PRO/DX INJ NEW DRUG ADDON: CPT

## 2023-02-20 PROCEDURE — 80048 BASIC METABOLIC PNL TOTAL CA: CPT

## 2023-02-20 PROCEDURE — 87449 NOS EACH ORGANISM AG IA: CPT

## 2023-02-20 PROCEDURE — 96365 THER/PROPH/DIAG IV INF INIT: CPT

## 2023-02-20 PROCEDURE — 96366 THER/PROPH/DIAG IV INF ADDON: CPT

## 2023-02-20 PROCEDURE — 84443 ASSAY THYROID STIM HORMONE: CPT

## 2023-02-20 PROCEDURE — 87086 URINE CULTURE/COLONY COUNT: CPT

## 2023-02-20 PROCEDURE — 83935 ASSAY OF URINE OSMOLALITY: CPT

## 2023-02-20 PROCEDURE — 84300 ASSAY OF URINE SODIUM: CPT

## 2023-02-20 PROCEDURE — 87324 CLOSTRIDIUM AG IA: CPT

## 2023-02-20 PROCEDURE — 87637 SARSCOV2&INF A&B&RSV AMP PRB: CPT

## 2023-02-20 PROCEDURE — 76705 ECHO EXAM OF ABDOMEN: CPT

## 2023-02-20 PROCEDURE — 87507 IADNA-DNA/RNA PROBE TQ 12-25: CPT

## 2023-02-20 PROCEDURE — 99285 EMERGENCY DEPT VISIT HI MDM: CPT

## 2023-02-20 PROCEDURE — 84100 ASSAY OF PHOSPHORUS: CPT

## 2023-02-20 PROCEDURE — 94640 AIRWAY INHALATION TREATMENT: CPT

## 2023-02-20 PROCEDURE — 83690 ASSAY OF LIPASE: CPT

## 2023-02-20 PROCEDURE — 99238 HOSP IP/OBS DSCHRG MGMT 30/<: CPT

## 2023-02-20 PROCEDURE — 86850 RBC ANTIBODY SCREEN: CPT

## 2023-02-20 PROCEDURE — 74177 CT ABD & PELVIS W/CONTRAST: CPT | Mod: MA

## 2023-02-20 PROCEDURE — 36415 COLL VENOUS BLD VENIPUNCTURE: CPT

## 2023-02-20 PROCEDURE — 81001 URINALYSIS AUTO W/SCOPE: CPT

## 2023-02-20 PROCEDURE — 85027 COMPLETE CBC AUTOMATED: CPT

## 2023-02-20 PROCEDURE — 83735 ASSAY OF MAGNESIUM: CPT

## 2023-02-20 RX ORDER — ESCITALOPRAM OXALATE 10 MG/1
1 TABLET, FILM COATED ORAL
Qty: 0 | Refills: 0 | DISCHARGE

## 2023-02-20 RX ORDER — MAGNESIUM SULFATE 500 MG/ML
1 VIAL (ML) INJECTION ONCE
Refills: 0 | Status: COMPLETED | OUTPATIENT
Start: 2023-02-20 | End: 2023-02-20

## 2023-02-20 RX ORDER — VANCOMYCIN HCL 1 G
1 VIAL (EA) INTRAVENOUS
Qty: 32 | Refills: 0
Start: 2023-02-20 | End: 2023-02-27

## 2023-02-20 RX ORDER — SIMETHICONE 80 MG/1
1 TABLET, CHEWABLE ORAL
Qty: 30 | Refills: 0
Start: 2023-02-20 | End: 2023-03-21

## 2023-02-20 RX ORDER — LOSARTAN POTASSIUM 100 MG/1
100 TABLET, FILM COATED ORAL DAILY
Refills: 0 | Status: DISCONTINUED | OUTPATIENT
Start: 2023-02-20 | End: 2023-02-20

## 2023-02-20 RX ORDER — APIXABAN 2.5 MG/1
1 TABLET, FILM COATED ORAL
Qty: 0 | Refills: 0 | DISCHARGE

## 2023-02-20 RX ORDER — PANTOPRAZOLE SODIUM 20 MG/1
40 TABLET, DELAYED RELEASE ORAL
Refills: 0 | Status: DISCONTINUED | OUTPATIENT
Start: 2023-02-20 | End: 2023-02-20

## 2023-02-20 RX ORDER — DABIGATRAN ETEXILATE MESYLATE 150 MG/1
1 CAPSULE ORAL
Qty: 0 | Refills: 0 | DISCHARGE

## 2023-02-20 RX ADMIN — Medication 250 MICROGRAM(S): at 06:17

## 2023-02-20 RX ADMIN — Medication 75 MICROGRAM(S): at 06:17

## 2023-02-20 RX ADMIN — Medication 1 TABLET(S): at 10:18

## 2023-02-20 RX ADMIN — MONTELUKAST 10 MILLIGRAM(S): 4 TABLET, CHEWABLE ORAL at 12:13

## 2023-02-20 RX ADMIN — ESCITALOPRAM OXALATE 5 MILLIGRAM(S): 10 TABLET, FILM COATED ORAL at 12:13

## 2023-02-20 RX ADMIN — DABIGATRAN ETEXILATE MESYLATE 150 MILLIGRAM(S): 150 CAPSULE ORAL at 10:09

## 2023-02-20 RX ADMIN — Medication 125 MILLIGRAM(S): at 06:17

## 2023-02-20 RX ADMIN — TIOTROPIUM BROMIDE 2 PUFF(S): 18 CAPSULE ORAL; RESPIRATORY (INHALATION) at 10:09

## 2023-02-20 RX ADMIN — Medication 125 MILLIGRAM(S): at 12:14

## 2023-02-20 RX ADMIN — Medication 100 GRAM(S): at 10:10

## 2023-02-20 NOTE — DISCHARGE NOTE PROVIDER - NSDCCPCAREPLAN_GEN_ALL_CORE_FT
PRINCIPAL DISCHARGE DIAGNOSIS  Diagnosis: Clostridium difficile colitis  Assessment and Plan of Treatment: You were admitted for severe diarrhea due to clostridium difficule colitis. During your admission we started vancomycin which is the antibiotic for this kind of infection. You are being discharged with vancomycin for 8 more days. Please take 125mg every 6 hours for 8 days until you complete the full course of antibioitcs. If you don't feel like your symptoms have improved, please reach out to your primary care doctor, or visit your nearest [facility] or emergency room. Additionally, it would be beneficial if you establish care with a gastroenterologist. We provided you with information for Dr. Aburto. Please call his office to schedule an appiontment at your earliest convenience. Be well and thank  you for trusting us with your care.       PRINCIPAL DISCHARGE DIAGNOSIS  Diagnosis: Clostridium difficile colitis  Assessment and Plan of Treatment: You were admitted for severe diarrhea due to clostridium difficule colitis. During your admission we started vancomycin which is the antibiotic for this kind of infection. You are being discharged with vancomycin for 8 more days. Please take 125mg every 6 hours for 8 days until you complete the full course of antibioitcs. If you don't feel like your symptoms have improved, please reach out to your primary care doctor, or visit your nearest [facility] or emergency room. Additionally, it would be beneficial if you establish care with a gastroenterologist. We provided you with information for Dr. Aburto. Please call his office to schedule an appiontment at your earliest convenience. Please follow up with your primary care doctor as well as Dr. Aburto.  Be well and thank  you for trusting us with your care.

## 2023-02-20 NOTE — DISCHARGE NOTE PROVIDER - NSDCCPTREATMENT_GEN_ALL_CORE_FT
PRINCIPAL PROCEDURE  Procedure: CT abdomen pelvis  Findings and Treatment: INTERPRETATION: CLINICAL INFORMATION: Lower abdominal pain and tenderness to palpation. Diarrhea.  COMPARISON: CT virtual colon September 26, 2018. CT chest January 27, 2022.  CONTRAST/COMPLICATIONS:  IV Contrast: Isovue 370 95 cc administered  Oral Contrast: Gastroview  Complications: None reported at time of study completion  PROCEDURE:  CT of the Abdomen and Pelvis was performed.  Sagittal and coronal reformats were performed.  FINDINGS:  LOWER CHEST: Cardiomegaly. Trace right pleural effusion and minimal adjacent atelectasis.  LIVER: Few hepatic cysts..  BILE DUCTS: Mildly dilated common bile duct up to 1.1 cm, previously up to 0.9 cm on January 27, 2022. Mild bilobar intrahepatic duct dilatation. No radiopaque stones in the biliary tree.  GALLBLADDER: Fluid distended. No wall thickening. Small dependent gallstone.  SPLEEN: Within normal limits.  PANCREAS: Senescent diffuse atrophy.  ADRENALS: Within normal limits.  KIDNEYS/URETERS: No hydronephrosis. Left renal cyst.  BLADDER: Within normal limits.  REPRODUCTIVE ORGANS: Uterus and adnexa within normal limits.  BOWEL: Rectosigmoid mucosal hyperenhancement, diffuse wall thickening and adjacent fat infiltration. Scattered colonic diverticula. No bowel obstruction. Appendix is normal.  PERITONEUM: Trace pelvic free fluid. No abscess..  VESSELS: Aortic atherosclerotic calcifications..  RETROPERITONEUM/LYMPH NODES: No lymphadenopathy.  ABDOMINAL WALL: Within normal limits.  BONES: Right hip arthroplasty. Degenerative changes.  IMPRESSION:  1. Diffuse proctosigmoiditis.  2. New mild intra and extra hepatic bile duct dilatation. Fluid distended gallbladder containing stone without pericholecystic fluid or wall thickening. Consider targeted gallbladder ultrasound correlation if concern for acute cholecystitis. Might consider MRCP.  3. New trace right pleural effusion.      SECONDARY PROCEDURE  Procedure: Abdominal ultrasound, limited  Findings and Treatment: INTERPRETATION: CLINICAL INFORMATION: Cholelithiasis, dilated hepatic ducts.  COMPARISON: None.  TECHNIQUE: Sonography of the right upper quadrant.  FINDINGS:  Liver: Simple cyst in the left lobe the liver, measuring 0.6 x 0.5 x 0.5 cm and 0.5 x 0.4 x 0.6 cm.  Bile ducts: Dilated intrahepatic bile ducts, measuring 0.6 cm. Common bile duct measures 1.1 cm.  Gallbladder: 0.5 cm mobile stone and layering sludge. No focal tenderness or evidence of cholecystitis.  Pancreas: Visualized portions are within normal limits.  Right kidney: 9.8 cm. No hydronephrosis. Upper pole cyst measuring 1.2 x 0.8 x 0.9 cm.  Ascites: None.  IVC: Visualized portions are within normal limits.  IMPRESSION:  Cholelithiasis and sludge visualized within the gallbladder. No evidence of acute cholecystitis.  Mild intra and extra hepatic bile duct dilatation. MRCP could be obtained for further evaluation if clinically warranted.

## 2023-02-20 NOTE — DISCHARGE NOTE PROVIDER - ATTENDING DISCHARGE PHYSICAL EXAMINATION:
NAD laying in bed; HHA at bedside  CTA b/l no wheezing or crackles  NL S1,S2 no mumurs  mild abdominal tenderness no rebound or guarding

## 2023-02-20 NOTE — DISCHARGE NOTE PROVIDER - NSDCMRMEDTOKEN_GEN_ALL_CORE_FT
atorvastatin 40 mg oral tablet: 1 tab(s) orally once a day  candesartan 32 mg oral tablet: 1 tab(s) orally once a day  dabigatran 150 mg oral capsule: 1 cap(s) orally 2 times a day  digoxin: 0.25 milligram(s) orally once a day  escitalopram 5 mg oral tablet: 1 tab(s) orally once a day  fluticasone-salmeterol 500 mcg-50 mcg inhalation powder: 2 puff(s) inhaled 2 times a day  Gas Relief Extra Strength 125 mg oral capsule: 1 cap(s) orally once a day   montelukast 10 mg oral tablet: 1 tab(s) orally 2 times a day  Spiriva: 2.5 microgram(s)2 puffs inhaled 2 times a day  Synthroid 75 mcg (0.075 mg) oral tablet: 1 tab(s) orally once a day  vancomycin 125 mg oral capsule: 1 cap(s) orally every 6 hours  Ventolin HFA 90 mcg/inh inhalation aerosol: 2 puff(s) inhaled every 6 hours

## 2023-02-20 NOTE — DISCHARGE NOTE PROVIDER - CARE PROVIDERS DIRECT ADDRESSES
[email or web address]

## 2023-02-20 NOTE — DISCHARGE NOTE NURSING/CASE MANAGEMENT/SOCIAL WORK - PATIENT PORTAL LINK FT
You can access the FollowMyHealth Patient Portal offered by [facility] by registering at the following website: [email or web address]. By joining [facility]’s FollowMyHealth portal, you will also be able to view your health information using other applications (apps) compatible with our system.

## 2023-02-20 NOTE — DISCHARGE NOTE PROVIDER - CARE PROVIDER_API CALL
Chapo Aburto (MD)  Gastroenterology; Internal Medicine  [address] [place] [address]  Phone: [phone]  Fax: [phone]  Follow Up Time: 2 weeks

## 2023-02-20 NOTE — DISCHARGE NOTE PROVIDER - HOSPITAL COURSE
#Discharge:    87 F PMH AFib (on pradaxa), prior CVA w/ no residual deficits, COPD no home O2, hypothyroid p/w abd pain and diarrhea x 2 weeks, found to be C diff positive, admitted for poor PO intake.    Hospital course:     #C. difficile diarrhea.   Pt presenting with profuse, watery diarrhea x2 weeks after 1wk course of oral antibiotic use, associated with diffuse abdominal pain and poor PO intake. Pt endorses black stools, but FOBT was negative in ED, likely 2/2 pepto bismol use. Pt found to be C diff positive. GI PCR negative and no recent travel. No subjective or documented fevers. Pt also found to have leukocytosis to 18 w/ neutrophil predominance, c/w severe C diff, though no elevated lactate or creatinine. CTAP negative for toxic megacolon. In the ED pt started on PO vanc and given 1L saline bolus.  - CTAP 2/17: diffuse proctosigmoiditis  - c/w vanc 125 PO q6, patient will be discharged with 8 more dyas of oral vancomycin 125mg every 6 hours for 8 days.   - patient reporting on 2/18 that she had one formed BM today  - started NS 50cc/hr for 6 hours.    #Abnormal CT scan.   #dilated intra/extrahepatic ducts  CTAP on 2/17 found to have new mild intra and extra hepatic bile duct dilatation and fluid distended gallbladder containing stone without pericholecystic fluid or wall thickening. Patient without RUQ pain and negative Dan's sign on exam. Normal alk phos, no transaminitis. No c/f acute choledocholithiasis at this time.  - RUQ U/S showing evidence of Cholelithiasis and sludge visualized within the gallbladder. No evidence of acute cholecystitis. Mild intra and extra hepatic bile duct dilatation. MRCP could be obtained for further evaluation if clinically warranted.    #Cholelithiasis.   ·  Plan: Pt found to have stone in gallbladder on CT, though w/o gallbladder wall thickening. Normal alk phos, no transaminitis.  - see above for RUQ U/S findings  - serial abdominal exams.    #Hyponatremia.   Pt w/ hyponatremia. Per previous records, has h/o chronic hyponatremia, previous admissions Na ranging 129-134 in April 2020. Pt also w/ slightly low chloride, likely from poor PO intake.  - f/u urine lytes  - encourage PO intake  - continue to monitor labs.    #Afib.   Pt w/ h/o AFib. Home medications: pradaxa 150mg BID, digoxin 250mcg, indapamide 1.25  - c/w home pradaxa and digoxin    #History of COPD.   Pt w/ h/o COPD. Bibasilar crackles on exam. Home medications: fluticasone 500 salmeterol 50, spiriva 1.5 mcg, montelukast 10mg  - c/w home meds: therapeutic interchange w/ symbicort, albuterol 90 q6 PRN, montelukast 10mg qD, spiriva 2.5ug 1 puffs qD.    #Hypothyroid.   Pt w/ h/o hypothyroid. Home medications: levothyroxine 75 qD.  - c/w home meds    #HTN/HLD  Home medications: atorvastatin 40mg PO qD, candesartan 25 mg qD  - c/w atorvastatin 40 qD    Patient was discharged to home.     New medications: oral vancomycin 125mg every 6 hours for 8 days.   Changes to old medications: none  Medications that were stopped: none    Items to follow up as outpatient: please follow up with your primary care doctor as well as Dr. Aburto who is a gastroenterologist. His information will be in the follow up section.     Physical exam at the time of discharge:  General: NAD, sitting up in bed with family at bedside   Head: NC/AT; MMM  Neck: Supple; no JVD  Respiratory: CTAB; no wheezes/rales/rhonchi  Cardiovascular: Regular rhythm/rate; S1/S2+, no murmurs, rubs gallops   Gastrointestinal: Soft; NTND; bowel sounds normal and present, abdominal pain resolving, 1/10 at rest and 2/10 with palpation, more discomfort than anything.   Extremities: WWP; no edema/cyanosis  Neurological: A&Ox4

## 2023-02-20 NOTE — DISCHARGE NOTE PROVIDER - NSDCFUADDAPPT_GEN_ALL_CORE_FT
Please follow up with your primary care doctor as well as Dr. Aburto who is a gastroenterologist. His information will be in the follow up section.

## 2023-02-24 DIAGNOSIS — E87.1 HYPO-OSMOLALITY AND HYPONATREMIA: ICD-10-CM

## 2023-02-24 DIAGNOSIS — Z79.01 LONG TERM (CURRENT) USE OF ANTICOAGULANTS: ICD-10-CM

## 2023-02-24 DIAGNOSIS — K80.20 CALCULUS OF GALLBLADDER WITHOUT CHOLECYSTITIS WITHOUT OBSTRUCTION: ICD-10-CM

## 2023-02-24 DIAGNOSIS — Z91.018 ALLERGY TO OTHER FOODS: ICD-10-CM

## 2023-02-24 DIAGNOSIS — Z79.890 HORMONE REPLACEMENT THERAPY: ICD-10-CM

## 2023-02-24 DIAGNOSIS — E78.5 HYPERLIPIDEMIA, UNSPECIFIED: ICD-10-CM

## 2023-02-24 DIAGNOSIS — E03.9 HYPOTHYROIDISM, UNSPECIFIED: ICD-10-CM

## 2023-02-24 DIAGNOSIS — Z87.891 PERSONAL HISTORY OF NICOTINE DEPENDENCE: ICD-10-CM

## 2023-02-24 DIAGNOSIS — Z79.51 LONG TERM (CURRENT) USE OF INHALED STEROIDS: ICD-10-CM

## 2023-02-24 DIAGNOSIS — J44.9 CHRONIC OBSTRUCTIVE PULMONARY DISEASE, UNSPECIFIED: ICD-10-CM

## 2023-02-24 DIAGNOSIS — I10 ESSENTIAL (PRIMARY) HYPERTENSION: ICD-10-CM

## 2023-02-24 DIAGNOSIS — A04.72 ENTEROCOLITIS DUE TO CLOSTRIDIUM DIFFICILE, NOT SPECIFIED AS RECURRENT: ICD-10-CM

## 2023-02-24 DIAGNOSIS — I48.91 UNSPECIFIED ATRIAL FIBRILLATION: ICD-10-CM

## 2023-02-24 DIAGNOSIS — Z86.73 PERSONAL HISTORY OF TRANSIENT ISCHEMIC ATTACK (TIA), AND CEREBRAL INFARCTION WITHOUT RESIDUAL DEFICITS: ICD-10-CM

## 2023-03-06 ENCOUNTER — APPOINTMENT (OUTPATIENT)
Age: 88
End: 2023-03-06
Payer: MEDICARE

## 2023-03-06 DIAGNOSIS — K62.89 OTHER SPECIFIED DISEASES OF ANUS AND RECTUM: ICD-10-CM

## 2023-03-06 DIAGNOSIS — A04.72 ENTEROCOLITIS DUE TO CLOSTRIDIUM DIFFICILE, NOT SPECIFIED AS RECURRENT: ICD-10-CM

## 2023-03-06 PROCEDURE — 99443: CPT | Mod: 95

## 2023-03-07 NOTE — ASSESSMENT
[FreeTextEntry1] : Resolving C. difficile colitis with no evidence of recurrence\par -Supportive care, strict handwashing, no need for repeat stool testing unless symptoms recur.  She is seeing her primary physician on Wednesday for a physical exam and will report back if any changes

## 2023-03-07 NOTE — HISTORY OF PRESENT ILLNESS
[FreeTextEntry1] : This is my first time seeing Ms. Clark, virtual posthospitalization visit for C. difficile colitis, resolving.  Her symptoms began with a bronchial infection that was treated with Augmentin on February 1 for 3 days.  She soon developed loose stools and was treated at the advice of her physician with Imodium Mylanta and Pepto-Bismol.  Her physician had also prescribed ciprofloxacin but she did not take this out of concern for possible C. difficile colitis.  She was dehydrated and had some intravenous rehydration solutions administered at home as she can only walk with assistance.  She ended up getting admitted to [facility] where she has a long history of being on the board.  She was there from February 14 to February 17 CT scan confirmed diffuse proctosigmoiditis and she was a treated initially with intravenous vancomycin and I believe Flagyl, was discharged uneventfully on a vancomycin taper.  She had a colonoscopy approximately 4 years ago by Dr. Martinez that was normal.  She is feeling significantly better at this point.  Her primary care physician is Maico Mathias and she is seeing him on Wednesday.  She complains only at this point of some soreness around her anus from the excessive bowel movements.

## 2023-03-07 NOTE — REASON FOR VISIT
[Home] : at home, [unfilled] , at the time of the visit. [Medical Office: ([facility])___] : at the medical office located in  [Verbal consent obtained from patient] : the patient, [unfilled] [Post Hospitalization] : a post hospitalization visit [FreeTextEntry1] : c difficile

## 2023-08-03 NOTE — ED ADULT NURSE NOTE - CHIEF COMPLAINT QUOTE
CC: GERD    83 year old female with above. States that symptoms are ongoing, no alleviating/exacerbating factors.  No bleeding or weight loss.     ROS:  No headache, no fever/chills, no chest pain/SOB, no nausea/vomiting/diarrhea/constipation/GI bleeding/abdominal pain, no dysuria/hematuria.    VSSAF   Exam:   Alert and oriented x 3; no apparent distress   PERRLA, sclera anicteric  CV: Regular rate/rhythm, normal PMI   Lungs: Clear bilaterally with no wheeze/rales   Abdomen: Soft, NT/ND, normal bowel sounds   Ext: No cyanosis, clubbing     Impression:   As above    Plan:   Proceed with endoscopy. Further recs to follow.      patient complains of productive cough and exertional SOB. patient coming from Dr. Tate's office for low oxygen levels.

## 2023-08-05 NOTE — PROGRESS NOTE ADULT - PROBLEM SELECTOR PROBLEM 8
Lexie Oconnor  Neurology  [address]  [place] [address]  Phone: [phone]  Fax: [phone]  Follow Up Time: 2 weeks   Prophylactic measure

## 2023-09-05 NOTE — DISCHARGE NOTE PROVIDER - NSDCQMERRANDS_GEN_ALL_CORE
Patient tolerated treatment today, she states she did feel pruritus throughout after treatment already completed though her rash is no worse at this point. She has pruritus at and around IV site also. Patient has benadryl at home on hand if needed and will call tomorrow if rash worsens and/or itching does not subside. She will RTO for same 9/26/23, Calendar schedule given through November. No

## 2023-11-21 ENCOUNTER — RX RENEWAL (OUTPATIENT)
Age: 88
End: 2023-11-21

## 2023-11-21 RX ORDER — SODIUM CHLORIDE FOR INHALATION 3 %
3 VIAL, NEBULIZER (ML) INHALATION 3 TIMES DAILY
Qty: 240 | Refills: 11 | Status: ACTIVE | COMMUNITY
Start: 2021-12-30 | End: 1900-01-01
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
## 2024-02-12 NOTE — PROGRESS NOTE ADULT - EYES
Patient: Ty Mckenna MRN: [id number]  SSN: [id number]    YOB: 1941  Age: 82 y.o.  Sex: male        ADMITTED: 2024 to Susi Martinez MD by Susi Martinez MD for Colonic mass [K63.89]  POD# 3 Days Post-Op Procedure(s):  ROBOTIC LEFT COLECTOMY, CYSTO INSERTION BILATERAL URETERAL STENTS OR CATHETHERS  .    Ty Mckenna is doing fair denies any discomfort with cathter . Urine in proximal tubing clear but merlot colored in bag . Pt denies any acute events overnight .Discussed removing cathter today   Urology following pt for gross hematuria     Hgb 11.7        Vitals: Temp (24hrs), Av.5 °F (36.9 °C), Min:98.2 °F (36.8 °C), Max:98.6 °F (37 °C)    Blood pressure (!) 145/87, pulse 71, temperature 98.2 °F (36.8 °C), temperature source Oral, resp. rate 16, height 1.778 m (5' 10\"), weight 105.8 kg (233 lb 4 oz), SpO2 94 %.    Intake and Output:  02/10 1901 -  0700  In: 480 [P.O.:480]  Out: 1900 [Urine:1900]   0701 -  1900  In: -   Out: 1   JOE Output lats 24 hrs: No data found.   JOE Output last 8 hrs: No data found.  BM over last 24 hrs: No data found.    Exam:   Gen: NAD  CV: extremities well perfused  Lungs: nonlabored respirations. Symmetric chest expansion  Ext: no edema  Abdomen: soft NTND no  CVAT   : carson draining clear urine     Labs:  CBC:   Lab Results   Component Value Date/Time    WBC 9.1 2024 03:01 AM    HCT 34.9 2024 03:01 AM     2024 03:01 AM     BMP:   Lab Results   Component Value Date/Time     2024 03:01 AM    K 3.5 2024 03:01 AM     2024 03:01 AM    CO2 26 2024 03:01 AM    BUN 10 2024 03:01 AM     Cultures: N/A    Imaging: N/A  Assessment/Plan:     1. Gross Hematuria - improved   DC carson today VT   Pt can DC from Urology stand point     Signed By: LOREN Suarez - NP - 2024    EOMI; PERRL; no drainage or redness

## 2024-03-19 NOTE — H&P ADULT. - CARDIOVASCULAR SYMPTOMS
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 Colonoscopy was performed October 18, 2022. This appreciated firm, circumferential mass and biopsies were obtained. Reportedly the rest of the exam was unremarkable for any colon abnormalities. Biopsy returned positive with high-grade dysplasia, fragments of a tubular adenoma. Imaging with CT chest abdomen pelvis was performed October 27, 2022. There was an irregular enhancing mass in the descending colon with numerous prominent pericolonic lymph nodes and mild associated fat stranding. Indeterminate liver lesion was appreciated the favored to represent focal hepatic steatosis. MRI liver November 30, 2022 which did not identify ominous liver pathology. There was a small region of focal fat near the intersegmental fissure corresponding to previous CT finding. No suspicious hepatic lesion. Patient was taken for robotic assisted left colectomy December 29, 2022. She had complete resection of left colonic mass as well as small bowel mesenteric implants. Pathology was consistent with microsatellite stable, well-differentiated adenocarcinoma (T2, N0). 25 cm times re-evaluate were negative for involvement. Mesenteric implant was negative for malignancy. Resections were negative. No lymphovascular invasion, perineural invasion. HISTORY OF PRESENT ILLNESS:  Julee Mejía is a 54year old female who presents for evaluation of colon cancer surveillance, well-differentiated adenocarcinoma status post resection (pT2pN0) without adjuvant therapy. Since being seen, patient did undergo colonoscopy July 28, 2023. This was unremarkable for abnormality.   She will be recalled for additional colonoscopy in July 2024 per recommendations of  Monique.    Her labs from February 19, 2024 without LFT abnormalities, alkaline phosphatase elevation, renal insufficiency, or electrolyte abnormalities. No cytopenias. Normal iron studies and LDH. Her tumor marker is less than 2.0. She was able to see Genetics August 2023. 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 Having irregular periods, has some perimenopausal symptoms. Working with gynecology and her primary care for this. Taking oral iron sparingly as she states she was told she was iron deficiency in June of 2023. At that time she had mildly reduced% iron saturation with normal iron, ferritin and TIBC. Her iron studies from February 19th show a ferritin of 22, total iron of 64, a TIBC of 388. PAST HISTORIES:  Past medical history, surgical history, family history, and social history were reviewed and updated. MEDICATIONS / ALLERGIES:  Medications and allergies were reviewed and updated. REVIEW OF SYSTEMS:  All other systems are reviewed and are negative except as documented in the history of present illness. Denies anxiety, depression or insomnia    PHYSICAL EXAM:  Vital Signs: Oncology Encounter Vitals [03/19/24 1625]   ONC OP Encounter Vitals Group      /63      Heart Rate 70      Resp 16      Temp 98.1 Â°F (36.7 Â°C)      Temp src Oral      SpO2 98 %      Weight 187 lb (84.8 kg)      Height       Pain Score 3      Pain Location       Pain Education? BSA (Calculated - m2) - [employer]       BSA (Calculated - sq m)       BMI (Calculated)      ECOG Performance Status:   ECOG [03/19/24 1624]   ECOG Performance Status 0     General:  Alert and oriented to time and place, well appearing, no distress  HEENT: PERRLA, EOMI. Neck: Supple, no lymphadenopathy  CV: RRR. No M/R/G. Chest: Clear bilaterally without wheezes or crackles. Abd: Soft, NT/ND.   No splenomegaly appreciated  Ext: No edema in lower extremities bilaterally. No pain to palpation, symmetric. Lymph nodes:  No cervical, supraclavicular adenopathy. Skin: no rashes or bruises. Psych: affect appropriate; speech normal; cooperative; logical and goal directed      DATA REVIEWED:  Laboratory Data:  Recent Labs   Lab 02/19/24  1501   WBC 10.3   RBC 4.35   HGB 12.8   HCT 38.0   MCV 87.4      Absolute Neutrophils 6.7   Absolute Lymphocytes 2.7   Absolute Monocytes 0.8   Absolute Eosinophils  0.1   Absolute Basophils 0.0     Recent Labs   Lab 02/19/24  1501   Glucose 106*   Sodium 136   Potassium 4.0   Chloride 103   Carbon Dioxide 28   BUN 12   Creatinine 0.64   Calcium 9.4   Protein, Total 7.4   Albumin 4.1   GOT/AST 16   Alkaline Phosphatase 71   GPT 19     Recent Labs   Lab 02/19/24  1501   Anion Gap 9   Globulin 3.3   LD, Total 143   Bilirubin, Total 0.4       Imaging Studies:  Imaging studies reviewed. The pertinent positives are reviewed    Problem List Items Addressed This Visit    None  Visit Diagnoses       History of colon cancer    -  Primary    Relevant Orders    CBC with Automated Differential    Comprehensive Metabolic Panel    Olmitz Draw    Ferritin    Iron And total Iron Binding Capacity    Reticulocyte Count Automated    Carcinoembryonic Antigen              ASSESSMENT AND PLAN:  History of colon cancer. Stage I ([id number]). Continues on surveillance and LV. Has normal tumor marker. Follows with surveillance colonoscopies. Had seen Genetics August 2023 without hereditary cancer mutation. We will continue with surveillance. As a Stage I, will need colonoscopy at 1 year after surgery. If advanced adenoma, would repeat in 1 year. If no advanced adenoma, would repeat in 3 yr then every 5 years. Follow Up  Return to clinic in 1 year with repeat labs  Will be due for colonoscopy July 2024 per Dr. Hunter Velasco    No clinical trial available. The patient indicated understanding of the diagnosis and agreed with the plan of care.  The patient is encouraged to call between now and next visit with any problems, questions or concerns that arise.     Damion Sandoval MD  Department of Hematology/Oncology dyspnea on exertion

## 2024-03-27 ENCOUNTER — APPOINTMENT (OUTPATIENT)
Dept: PULMONOLOGY | Facility: CLINIC | Age: 89
End: 2024-03-27
Payer: MEDICARE

## 2024-03-27 VITALS
OXYGEN SATURATION: 96 % | HEIGHT: 67 IN | WEIGHT: 126 LBS | HEART RATE: 73 BPM | BODY MASS INDEX: 19.78 KG/M2 | DIASTOLIC BLOOD PRESSURE: 78 MMHG | TEMPERATURE: 97.88 F | SYSTOLIC BLOOD PRESSURE: 159 MMHG

## 2024-03-27 PROCEDURE — 99214 OFFICE O/P EST MOD 30 MIN: CPT

## 2024-03-28 ENCOUNTER — TRANSCRIPTION ENCOUNTER (OUTPATIENT)
Age: 89
End: 2024-03-28

## 2024-03-29 NOTE — HISTORY OF PRESENT ILLNESS
[TextBox_4] : Very pleasant 88-year-old woman seen today with her daughter.  She has a long history of bronchiectasis, last seen by me in 2021 more recently followed by Dr. Soto who has now moved out of the area.  She is short of breath with almost any exertion, and tells me she will desaturate to about 87% with ordinary walking.  She uses a nebulized albuterol about twice a day, has a chronic cough with some thick secretions.  She uses the Aerobika device but does not find it helps her raise secretions very much.  She had been using Advair routinely, more recently given Trelegy, it is not clear that she finds a difference between the 2.  She has been using prednisone 5 mg a day for the last 2 or 3 months.  She does think this helps.

## 2024-03-29 NOTE — PHYSICAL EXAM
[Normal Oropharynx] : normal oropharynx [No Acute Distress] : no acute distress [No Neck Mass] : no neck mass [Normal Appearance] : normal appearance [Normal S1, S2] : normal s1, s2 [Normal Rate/Rhythm] : normal rate/rhythm [No Murmurs] : no murmurs [No Resp Distress] : no resp distress [Benign] : benign [No Abnormalities] : no abnormalities [Normal Gait] : normal gait [No Clubbing] : no clubbing [No Cyanosis] : no cyanosis [FROM] : FROM [Normal Color/ Pigmentation] : normal color/ pigmentation [No Focal Deficits] : no focal deficits [Oriented x3] : oriented x3 [Normal Affect] : normal affect [TextBox_68] : Reduced breath sounds, few crackles at left base which clear with cough

## 2024-03-29 NOTE — ASSESSMENT
[FreeTextEntry1] : Bronchiectasis with severe obstructive lung disease.  I think she is pretty much maximally treated with medications and low-dose steroids.  Aerobic has not been very successful in helping her with secretions.  I would look into getting a vest device for her to mobilize secretions if possible, I have also suggested she look into a pulmonary rehab program which I think she would be an excellent candidate for.  I will see her again in about 2 months.

## 2024-04-01 ENCOUNTER — TRANSCRIPTION ENCOUNTER (OUTPATIENT)
Age: 89
End: 2024-04-01

## 2024-04-02 ENCOUNTER — APPOINTMENT (OUTPATIENT)
Dept: ORTHOPEDIC SURGERY | Facility: CLINIC | Age: 89
End: 2024-04-02
Payer: MEDICARE

## 2024-04-02 ENCOUNTER — OUTPATIENT (OUTPATIENT)
Dept: OUTPATIENT SERVICES | Facility: HOSPITAL | Age: 89
LOS: 1 days | End: 2024-04-02
Payer: MEDICARE

## 2024-04-02 ENCOUNTER — RESULT REVIEW (OUTPATIENT)
Age: 89
End: 2024-04-02

## 2024-04-02 VITALS
HEART RATE: 86 BPM | DIASTOLIC BLOOD PRESSURE: 79 MMHG | TEMPERATURE: 97.3 F | BODY MASS INDEX: 20.25 KG/M2 | HEIGHT: 66 IN | WEIGHT: 126 LBS | SYSTOLIC BLOOD PRESSURE: 149 MMHG | OXYGEN SATURATION: 93 %

## 2024-04-02 DIAGNOSIS — Z96.641 PRESENCE OF RIGHT ARTIFICIAL HIP JOINT: Chronic | ICD-10-CM

## 2024-04-02 PROCEDURE — 20610 DRAIN/INJ JOINT/BURSA W/O US: CPT | Mod: LT

## 2024-04-02 PROCEDURE — 73564 X-RAY EXAM KNEE 4 OR MORE: CPT

## 2024-04-02 PROCEDURE — 99203 OFFICE O/P NEW LOW 30 MIN: CPT | Mod: 25

## 2024-04-02 PROCEDURE — 73564 X-RAY EXAM KNEE 4 OR MORE: CPT | Mod: 26,50

## 2024-04-02 RX ORDER — FLUTICASONE PROPIONATE AND SALMETEROL 50; 500 UG/1; UG/1
500-50 POWDER RESPIRATORY (INHALATION) TWICE DAILY
Refills: 0 | Status: COMPLETED | COMMUNITY
End: 2024-04-02

## 2024-04-03 ENCOUNTER — TRANSCRIPTION ENCOUNTER (OUTPATIENT)
Age: 89
End: 2024-04-03

## 2024-04-03 NOTE — PROCEDURE
[de-identified] : Under strict sterile technique, the left knee was prepped with chloraprep. Using the superolateral approach, with the patient seated in her wheelchair and leg extended,  1ml of Kenalog was mixed with 5mls of 1% Lidocaine and 5mLs of 0.5% Marcaine, and was injected intraarticularly. The patient tolerated the procedure well. The patient was instructed to avoid vigorous exercise for 24 hours and will apply ice to the knee for 20 minutes 2-3 times per day if discomfort occurs. Patient will return on an as needed basis. The patient will call if any questions or problems should arise

## 2024-04-03 NOTE — END OF VISIT
[FreeTextEntry3] : All medical record entries made by STEVE Marcano, acting as a scribe for this encounter under the direction of Reggie Cesar MD . I have reviewed the chart and agree that the record accurately reflects my personal performance of the history, physical exam, assessment and plan. I have also personally directed, reviewed, and agreed with the chart.

## 2024-04-03 NOTE — DISCUSSION/SUMMARY
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 She received a cortisone injection today. She will increase her activities as tolerated. we will see her back on an as needed basis. SHe may benefit from HA in the future. She will call if any issues arise

## 2024-04-03 NOTE — PHYSICAL EXAM
[de-identified] : The patient is a well developed, well nourished female in no apparent distress. She is alert and oriented X 3 with a pleasant mood and appropriate affect.    On physical examination of the left knee, her ROM is 0-120 degrees. The patient is seated in a wheelchiar. she stands in valgus alignment. There is trace effusion. No warmth or erythema is noted. The patella is non tender to palpation medially or laterally. There is no crepitus noted. The apprehension and grind tests are negative. The extensor mechanism is intact. There is lateral joint line tenderness. The Damian sign is absent. The Lachman and pivot shift tests are negative. There is no varus or valgus laxity at 0 or 30 degrees. No posterolateral or anteromedial laxity is noted. No masses are palpable. No other soft tissue or bony tenderness is noted. Quadriceps weakness is noted. Neurovascular function is intact.  [de-identified] : Radiographs of both knees show severe lateral joint space narrowing with valgus deformity left knee

## 2024-04-03 NOTE — HISTORY OF PRESENT ILLNESS
[de-identified] : Selam Clark is an 87 yo woman who presents today for evaluation of her left knee. She has not been seen here since 2018. She has longstanding issues with DJD in her left knee. She has had progressive pain and stiffness in her left knee. She has significant medical issues, including bronchiectasis, which make walking difficult. She relies on a wheelchair and uses a cane for short distance ambulation.

## 2024-05-09 ENCOUNTER — APPOINTMENT (OUTPATIENT)
Dept: ORTHOPEDIC SURGERY | Facility: CLINIC | Age: 89
End: 2024-05-09
Payer: MEDICARE

## 2024-05-09 VITALS
BODY MASS INDEX: 20.25 KG/M2 | WEIGHT: 126 LBS | SYSTOLIC BLOOD PRESSURE: 158 MMHG | HEART RATE: 71 BPM | OXYGEN SATURATION: 93 % | DIASTOLIC BLOOD PRESSURE: 79 MMHG | TEMPERATURE: 97.8 F | HEIGHT: 66 IN

## 2024-05-09 DIAGNOSIS — M17.12 UNILATERAL PRIMARY OSTEOARTHRITIS, LEFT KNEE: ICD-10-CM

## 2024-05-09 DIAGNOSIS — M17.10 UNILATERAL PRIMARY OSTEOARTHRITIS, UNSPECIFIED KNEE: ICD-10-CM

## 2024-05-09 PROCEDURE — 20610 DRAIN/INJ JOINT/BURSA W/O US: CPT | Mod: LT

## 2024-05-09 PROCEDURE — 99213 OFFICE O/P EST LOW 20 MIN: CPT | Mod: 25

## 2024-05-10 PROBLEM — M17.12 PRIMARY OSTEOARTHRITIS OF LEFT KNEE: Status: ACTIVE | Noted: 2018-07-11

## 2024-05-10 NOTE — HISTORY OF PRESENT ILLNESS
[de-identified] : Selam returns today for evaluaton of her left knee. She had about 4 weeks of relief from the cortisone injection. She has had increased lateral knee pain and stiffness. She has had more diifficulty transferiing. She has weakness and difficulty bearing weight. SHe denies any locking

## 2024-05-10 NOTE — PROCEDURE
[de-identified] : Under strict sterile technique, the left knee was prepped with chloraprep. Using the superolateral approach, with the patient seated in her wheelchair and leg extended, 4ml of Monovisc was injected intraarticularly. The patient tolerated the procedure well. The patient was instructed to avoid vigorous exercise for 24 hours and will apply ice to the knee for 20 minutes 2-3 times per day if discomfort occurs. Patient will return on an as needed basis. The patient will call if any questions or problems should arise.  left knee MOnovisc lot [id number] 2026-

## 2024-05-10 NOTE — PHYSICAL EXAM
[de-identified] : The patient is a well developed, well nourished female in no apparent distress. She is alert and oriented X 3 with a pleasant mood and appropriate affect.    On physical examination of the left knee, her ROM is 0-120 degrees. The patient is seated in a wheelchiar. she stands in valgus alignment. There is trace effusion. No warmth or erythema is noted. The patella is non tender to palpation medially or laterally. There is no crepitus noted. The apprehension and grind tests are negative. The extensor mechanism is intact. There is lateral joint line tenderness. The Damian sign is absent. The Lachman and pivot shift tests are negative. There is no varus or valgus laxity at 0 or 30 degrees. No posterolateral or anteromedial laxity is noted. No masses are palpable. No other soft tissue or bony tenderness is noted. Quadriceps weakness is noted. Neurovascular function is intact.

## 2024-05-10 NOTE — DISCUSSION/SUMMARY
[de-identified] : nona has symptomatic lateral compartment DJD in her left knee. She did not have much relief from the cortisone injection. She received a Monovisc injection today. She will increase her activities as tolerated. we will see her back on an as needed basis. She will call if any issues arise

## 2024-05-13 RX ORDER — FUROSEMIDE 20 MG/1
20 TABLET ORAL
Qty: 30 | Refills: 0 | Status: ACTIVE | COMMUNITY
Start: 2023-11-15

## 2024-05-13 RX ORDER — AMOXICILLIN AND CLAVULANATE POTASSIUM 875; 125 MG/1; MG/1
875-125 TABLET, COATED ORAL
Qty: 20 | Refills: 0 | Status: COMPLETED | COMMUNITY
Start: 2024-04-22

## 2024-05-13 RX ORDER — LEVOTHYROXINE SODIUM 0.05 MG/1
50 TABLET ORAL
Qty: 90 | Refills: 0 | Status: ACTIVE | COMMUNITY
Start: 2023-12-07

## 2024-05-13 RX ORDER — OXYCODONE 5 MG/1
5 TABLET ORAL
Qty: 12 | Refills: 0 | Status: ACTIVE | COMMUNITY
Start: 2024-04-21

## 2024-05-13 RX ORDER — PREDNISONE 5 MG/1
5 TABLET ORAL
Qty: 45 | Refills: 0 | Status: COMPLETED | COMMUNITY
Start: 2024-01-09

## 2024-05-13 RX ORDER — FLUTICASONE FUROATE, UMECLIDINIUM BROMIDE AND VILANTEROL TRIFENATATE 200; 62.5; 25 UG/1; UG/1; UG/1
200-62.5-25 POWDER RESPIRATORY (INHALATION)
Qty: 60 | Refills: 0 | Status: ACTIVE | COMMUNITY
Start: 2023-12-27

## 2024-05-13 RX ORDER — HYDROCODONE BITARTRATE AND HOMATROPINE METHYLBROMIDE 1.5; 5 MG/5ML; MG/5ML
5-1.5 SOLUTION ORAL
Qty: 150 | Refills: 0 | Status: COMPLETED | COMMUNITY
Start: 2023-10-15

## 2024-05-13 RX ORDER — LIDOCAINE 5% 700 MG/1
5 PATCH TOPICAL
Qty: 20 | Refills: 0 | Status: ACTIVE | COMMUNITY
Start: 2024-05-05

## 2024-05-13 RX ORDER — MUPIROCIN 20 MG/G
2 OINTMENT TOPICAL
Qty: 22 | Refills: 0 | Status: ACTIVE | COMMUNITY
Start: 2024-04-22

## 2024-05-13 RX ORDER — ESCITALOPRAM OXALATE 5 MG/1
5 TABLET ORAL
Qty: 30 | Refills: 0 | Status: ACTIVE | COMMUNITY
Start: 2023-09-28

## 2024-05-29 ENCOUNTER — TRANSCRIPTION ENCOUNTER (OUTPATIENT)
Age: 89
End: 2024-05-29

## 2024-05-30 ENCOUNTER — TRANSCRIPTION ENCOUNTER (OUTPATIENT)
Age: 89
End: 2024-05-30

## 2024-05-31 ENCOUNTER — APPOINTMENT (OUTPATIENT)
Dept: PULMONOLOGY | Facility: CLINIC | Age: 89
End: 2024-05-31

## 2024-05-31 ENCOUNTER — APPOINTMENT (OUTPATIENT)
Dept: PULMONOLOGY | Facility: CLINIC | Age: 89
End: 2024-05-31
Payer: MEDICARE

## 2024-05-31 VITALS
OXYGEN SATURATION: 92 % | TEMPERATURE: 97.16 F | BODY MASS INDEX: 19.93 KG/M2 | HEART RATE: 75 BPM | WEIGHT: 124 LBS | HEIGHT: 66 IN | DIASTOLIC BLOOD PRESSURE: 76 MMHG | SYSTOLIC BLOOD PRESSURE: 144 MMHG

## 2024-05-31 PROCEDURE — 99214 OFFICE O/P EST MOD 30 MIN: CPT

## 2024-06-07 NOTE — ASSESSMENT
[FreeTextEntry1] : Frail elderly woman with bronchiectasis.  Generally stable, appears to be benefiting from pulmonary rehab program.  Walking in the [place] today without oxygen her action saturation went down to about 87%.  At rest she is in the low 90s without oxygen.  She should continue to use oxygen with exertion and at bedtime.  Continue on bronchodilators including LABA ICS LAMA.  Discussed methods such as inhaling steam to help raise secretions.  She is concerned about long-term use of prednisone but this has appeared to help.  At least subjectively.  I have suggested she try using prednisone every other day, 5 mg.  I would like to see her again in about 3 months if she is doing well.

## 2024-06-07 NOTE — PHYSICAL EXAM
[No Acute Distress] : no acute distress [Normal Oropharynx] : normal oropharynx [Normal Appearance] : normal appearance [No Neck Mass] : no neck mass [Normal Rate/Rhythm] : normal rate/rhythm [Normal S1, S2] : normal s1, s2 [No Murmurs] : no murmurs [No Resp Distress] : no resp distress [No Abnormalities] : no abnormalities [Benign] : benign [No Clubbing] : no clubbing [No Cyanosis] : no cyanosis [FROM] : FROM [Normal Color/ Pigmentation] : normal color/ pigmentation [No Focal Deficits] : no focal deficits [Oriented x3] : oriented x3 [Normal Affect] : normal affect [TextBox_68] : Reduced breath sounds, no crackles

## 2024-07-29 ENCOUNTER — TRANSCRIPTION ENCOUNTER (OUTPATIENT)
Age: 89
End: 2024-07-29

## 2024-07-30 ENCOUNTER — NON-APPOINTMENT (OUTPATIENT)
Age: 89
End: 2024-07-30

## 2024-07-31 ENCOUNTER — APPOINTMENT (OUTPATIENT)
Dept: PULMONOLOGY | Facility: CLINIC | Age: 89
End: 2024-07-31
Payer: MEDICARE

## 2024-07-31 VITALS
RESPIRATION RATE: 12 BRPM | BODY MASS INDEX: 20.09 KG/M2 | WEIGHT: 125 LBS | HEART RATE: 93 BPM | DIASTOLIC BLOOD PRESSURE: 75 MMHG | HEIGHT: 66 IN | SYSTOLIC BLOOD PRESSURE: 145 MMHG | TEMPERATURE: 96.4 F | OXYGEN SATURATION: 94 %

## 2024-07-31 DIAGNOSIS — J47.9 BRONCHIECTASIS, UNCOMPLICATED: ICD-10-CM

## 2024-07-31 DIAGNOSIS — J44.89 OTHER SPECIFIED CHRONIC OBSTRUCTIVE PULMONARY DISEASE: ICD-10-CM

## 2024-07-31 PROCEDURE — 99214 OFFICE O/P EST MOD 30 MIN: CPT

## 2024-08-01 PROBLEM — J44.89 COPD WITH CHRONIC BRONCHITIS: Status: ACTIVE | Noted: 2024-08-01

## 2024-08-01 NOTE — ASSESSMENT
[FreeTextEntry1] : severe COPD, bronchiectasis  Stable severe disease.  Urged to continue to exercise with rehab and without.  Consider Vest for mobilization of secretions- will see if can be ordered    f/u 3 months if OK.

## 2024-08-01 NOTE — PHYSICAL EXAM
[No Acute Distress] : no acute distress [Normal Oropharynx] : normal oropharynx [Normal Appearance] : normal appearance [No Neck Mass] : no neck mass [Normal Rate/Rhythm] : normal rate/rhythm [Normal S1, S2] : normal s1, s2 [No Murmurs] : no murmurs [No Resp Distress] : no resp distress [No Abnormalities] : no abnormalities [Benign] : benign [No Clubbing] : no clubbing [No Cyanosis] : no cyanosis [FROM] : FROM [Normal Color/ Pigmentation] : normal color/ pigmentation [No Focal Deficits] : no focal deficits [Oriented x3] : oriented x3 [Normal Affect] : normal affect [TextBox_68] : Reduced breath sounds, no crackles, hyperinflated

## 2024-08-01 NOTE — HISTORY OF PRESENT ILLNESS
[TextBox_4] : 8/1/2024: Very pleasant 89-year-old woman seen again today with her daughter.  Long history of bronchiectasis and COPD, severely disabled short of breath on almost any exertion.  Off oxygen she can desaturate with walking to the high 80s.  She is currently using Trelegy as her only inhaler, uses albuterol via nebulizer morning and night which does help her cough afterwards, has an albuterol metered-dose inhaler for emergency use but has not used it recently.  She also uses Aerobika which she finds  helpful in raising secretions.  She is now involved in a pulmonary rehab program which she goes to at least once a week and she finds this very helpful.  No significant exacerbation of her lung disease since I last saw her May 31.
3 (mild pain)

## 2024-08-20 NOTE — ED PROVIDER NOTE - CPE EDP CARDIAC NORM
[FreeTextEntry3] : nostril and perinasal skin: fissured scaling crusted erythematous plaques On the nose and medial cheeks, there are also scattered smooth monomorphic papules   arms, legs, chest, inguinal folds, abd, back - ill defined scattered scaling papules and plaques.  no involvement of fingers, toes, wrists.   normal...

## 2024-08-22 ENCOUNTER — APPOINTMENT (OUTPATIENT)
Dept: ORTHOPEDIC SURGERY | Facility: CLINIC | Age: 89
End: 2024-08-22
Payer: MEDICARE

## 2024-08-22 VITALS
SYSTOLIC BLOOD PRESSURE: 145 MMHG | HEIGHT: 67 IN | WEIGHT: 125 LBS | BODY MASS INDEX: 19.62 KG/M2 | DIASTOLIC BLOOD PRESSURE: 72 MMHG | HEART RATE: 95 BPM | OXYGEN SATURATION: 94 % | TEMPERATURE: 96.3 F

## 2024-08-22 DIAGNOSIS — M17.12 UNILATERAL PRIMARY OSTEOARTHRITIS, LEFT KNEE: ICD-10-CM

## 2024-08-22 PROCEDURE — 99213 OFFICE O/P EST LOW 20 MIN: CPT | Mod: 25

## 2024-08-22 PROCEDURE — 20610 DRAIN/INJ JOINT/BURSA W/O US: CPT | Mod: LT

## 2024-08-23 NOTE — PHYSICAL EXAM
[de-identified] : The patient is a well developed, well nourished female in no apparent distress. She is alert and oriented X 3 with a pleasant mood and appropriate affect.    On physical examination of the left knee, her ROM is 0-120 degrees. The patient is seated in a wheelchiar. she stands in valgus alignment. There is trace effusion. No warmth or erythema is noted. The patella is non tender to palpation medially or laterally. There is no crepitus noted. The apprehension and grind tests are negative. The extensor mechanism is intact. There is lateral joint line tenderness. The Damian sign is absent. The Lachman and pivot shift tests are negative. There is no varus or valgus laxity at 0 or 30 degrees. No posterolateral or anteromedial laxity is noted. No masses are palpable. No other soft tissue or bony tenderness is noted. Quadriceps weakness is noted. Neurovascular function is intact.

## 2024-08-23 NOTE — PHYSICAL EXAM
[de-identified] : The patient is a well developed, well nourished female in no apparent distress. She is alert and oriented X 3 with a pleasant mood and appropriate affect.    On physical examination of the left knee, her ROM is 0-120 degrees. The patient is seated in a wheelchiar. she stands in valgus alignment. There is trace effusion. No warmth or erythema is noted. The patella is non tender to palpation medially or laterally. There is no crepitus noted. The apprehension and grind tests are negative. The extensor mechanism is intact. There is lateral joint line tenderness. The Damian sign is absent. The Lachman and pivot shift tests are negative. There is no varus or valgus laxity at 0 or 30 degrees. No posterolateral or anteromedial laxity is noted. No masses are palpable. No other soft tissue or bony tenderness is noted. Quadriceps weakness is noted. Neurovascular function is intact.

## 2024-08-23 NOTE — DISCUSSION/SUMMARY
[de-identified] : nona has symptomatic lateral compartment DJD in her left knee. She did not have much sympromatic improvement following her Monovisc injection. She has had worsening knee pain and stiffness. She will increase her activities as tolerated. we will see her back on an as needed basis. She will call if any issues arise

## 2024-08-23 NOTE — PROCEDURE
[de-identified] : Under strict sterile technique, the left knee was prepped with chloraprep. Using the superolateral approach, with the patient seated in her wheelchair and leg extended, 1ml of Kenalog was mixed with 5mls of 1% Lidocaine and 5mLs of 0.5% Marcaine, and was injected intraarticularly. The patient tolerated the procedure well. The patient was instructed to avoid vigorous exercise for 24 hours and will apply ice to the knee for 20 minutes 2-3 times per day if discomfort occurs. Patient will return on an as needed basis. The patient will call if any questions or problems should arise.  Injection was administered by Reggie Cesar MD

## 2024-08-23 NOTE — HISTORY OF PRESENT ILLNESS
[de-identified] : Selam returns today for evaluaton of her left knee.  She has had increased lateral knee pain and stiffness. She has had more diifficulty transferiing. She has weakness and difficulty bearing weight. SHe denies any locking. SHe had more improvement with the cortisone injection in February than with the HA injection in March

## 2024-08-23 NOTE — HISTORY OF PRESENT ILLNESS
[de-identified] : Selam returns today for evaluaton of her left knee.  She has had increased lateral knee pain and stiffness. She has had more diifficulty transferiing. She has weakness and difficulty bearing weight. SHe denies any locking. SHe had more improvement with the cortisone injection in February than with the HA injection in March

## 2024-08-23 NOTE — PROCEDURE
[de-identified] : Under strict sterile technique, the left knee was prepped with chloraprep. Using the superolateral approach, with the patient seated in her wheelchair and leg extended, 1ml of Kenalog was mixed with 5mls of 1% Lidocaine and 5mLs of 0.5% Marcaine, and was injected intraarticularly. The patient tolerated the procedure well. The patient was instructed to avoid vigorous exercise for 24 hours and will apply ice to the knee for 20 minutes 2-3 times per day if discomfort occurs. Patient will return on an as needed basis. The patient will call if any questions or problems should arise.  Injection was administered by Reggie Cesar MD

## 2024-08-23 NOTE — PHYSICAL EXAM
[de-identified] : The patient is a well developed, well nourished female in no apparent distress. She is alert and oriented X 3 with a pleasant mood and appropriate affect.    On physical examination of the left knee, her ROM is 0-120 degrees. The patient is seated in a wheelchiar. she stands in valgus alignment. There is trace effusion. No warmth or erythema is noted. The patella is non tender to palpation medially or laterally. There is no crepitus noted. The apprehension and grind tests are negative. The extensor mechanism is intact. There is lateral joint line tenderness. The Damian sign is absent. The Lachman and pivot shift tests are negative. There is no varus or valgus laxity at 0 or 30 degrees. No posterolateral or anteromedial laxity is noted. No masses are palpable. No other soft tissue or bony tenderness is noted. Quadriceps weakness is noted. Neurovascular function is intact.

## 2024-08-23 NOTE — DISCUSSION/SUMMARY
[de-identified] : nona has symptomatic lateral compartment DJD in her left knee. She did not have much sympromatic improvement following her Monovisc injection. She has had worsening knee pain and stiffness. She will increase her activities as tolerated. we will see her back on an as needed basis. She will call if any issues arise

## 2024-08-23 NOTE — DISCUSSION/SUMMARY
Department of Anesthesiology  Postprocedure Note    Patient: Srinivas Dawson  MRN: [id number]  [place]: 1959  Date of evaluation: 10/19/2022      Procedure Summary     Date: 10/19/22 Room / Location: [address] [place] [address]    Anesthesia Start: 1224 Anesthesia Stop: 4077    Procedure: IR PORT PLACEMENT EQUAL OR GREATER THAN 5 YEARS Diagnosis:       Multiple myeloma not having achieved remission ([facility] [address]      Multiple myeloma not having achieved remission ([facility] [address]    Scheduled Providers: RHIANNON Molina CRNA; Tiffanie Peñaloza DO Responsible Provider: Tiffanie Peñaloza DO    Anesthesia Type: TIVA ASA Status: 3          Anesthesia Type: No value filed.     Sylvester Phase I:      Sylvester Phase II:        Anesthesia Post Evaluation    Patient location during evaluation: PACU  Level of consciousness: awake and alert  Pain score: 0  Airway patency: patent  Nausea & Vomiting: no nausea  Complications: no  Cardiovascular status: hemodynamically stable  Respiratory status: acceptable  Hydration status: euvolemic [de-identified] : nona has symptomatic lateral compartment DJD in her left knee. She did not have much sympromatic improvement following her Monovisc injection. She has had worsening knee pain and stiffness. She will increase her activities as tolerated. we will see her back on an as needed basis. She will call if any issues arise

## 2024-08-23 NOTE — END OF VISIT
[FreeTextEntry3] : Dr Cesar personally administered the injection today.Dr Cesar has reviewed the chart and agrees that the record accurately reflects his personal performance of the history, physical exam, assessment, and plan. He personally directed, reviewed, and agreed with the chart.All medical record entries made by STEVE Marcano, acting as a scribe for this encounter under the direction of Reggie Cesar MD

## 2024-08-23 NOTE — HISTORY OF PRESENT ILLNESS
[de-identified] : Selam returns today for evaluaton of her left knee.  She has had increased lateral knee pain and stiffness. She has had more diifficulty transferiing. She has weakness and difficulty bearing weight. SHe denies any locking. SHe had more improvement with the cortisone injection in February than with the HA injection in March

## 2024-08-23 NOTE — PROCEDURE
[de-identified] : Under strict sterile technique, the left knee was prepped with chloraprep. Using the superolateral approach, with the patient seated in her wheelchair and leg extended, 1ml of Kenalog was mixed with 5mls of 1% Lidocaine and 5mLs of 0.5% Marcaine, and was injected intraarticularly. The patient tolerated the procedure well. The patient was instructed to avoid vigorous exercise for 24 hours and will apply ice to the knee for 20 minutes 2-3 times per day if discomfort occurs. Patient will return on an as needed basis. The patient will call if any questions or problems should arise.  Injection was administered by Reggie Cesar MD

## 2024-12-05 ENCOUNTER — APPOINTMENT (OUTPATIENT)
Dept: ORTHOPEDIC SURGERY | Facility: CLINIC | Age: 89
End: 2024-12-05
Payer: MEDICARE

## 2024-12-05 VITALS
SYSTOLIC BLOOD PRESSURE: 152 MMHG | BODY MASS INDEX: 19.62 KG/M2 | OXYGEN SATURATION: 77 % | WEIGHT: 125 LBS | DIASTOLIC BLOOD PRESSURE: 73 MMHG | HEIGHT: 67 IN | HEART RATE: 98 BPM

## 2024-12-05 DIAGNOSIS — M17.12 UNILATERAL PRIMARY OSTEOARTHRITIS, LEFT KNEE: ICD-10-CM

## 2024-12-05 PROCEDURE — 20610 DRAIN/INJ JOINT/BURSA W/O US: CPT | Mod: LT

## 2024-12-05 PROCEDURE — 99213 OFFICE O/P EST LOW 20 MIN: CPT | Mod: 25

## 2025-01-01 ENCOUNTER — TRANSCRIPTION ENCOUNTER (OUTPATIENT)
Age: 89
End: 2025-01-01

## 2025-01-06 ENCOUNTER — TRANSCRIPTION ENCOUNTER (OUTPATIENT)
Age: 89
End: 2025-01-06

## 2025-04-08 ENCOUNTER — NON-APPOINTMENT (OUTPATIENT)
Age: 89
End: 2025-04-08

## 2025-04-08 ENCOUNTER — APPOINTMENT (OUTPATIENT)
Dept: ORTHOPEDIC SURGERY | Facility: CLINIC | Age: 89
End: 2025-04-08
Payer: MEDICARE

## 2025-04-08 VITALS
SYSTOLIC BLOOD PRESSURE: 180 MMHG | HEART RATE: 73 BPM | HEIGHT: 67 IN | DIASTOLIC BLOOD PRESSURE: 66 MMHG | WEIGHT: 125 LBS | TEMPERATURE: 97.4 F | BODY MASS INDEX: 19.62 KG/M2 | OXYGEN SATURATION: 95 %

## 2025-04-08 DIAGNOSIS — M17.12 UNILATERAL PRIMARY OSTEOARTHRITIS, LEFT KNEE: ICD-10-CM

## 2025-04-08 PROCEDURE — 99213 OFFICE O/P EST LOW 20 MIN: CPT | Mod: 25

## 2025-04-08 PROCEDURE — 20610 DRAIN/INJ JOINT/BURSA W/O US: CPT | Mod: LT

## 2025-07-15 ENCOUNTER — APPOINTMENT (OUTPATIENT)
Dept: INTERNAL MEDICINE | Facility: CLINIC | Age: 89
End: 2025-07-15